# Patient Record
Sex: FEMALE | Race: WHITE | Employment: FULL TIME | URBAN - METROPOLITAN AREA
[De-identification: names, ages, dates, MRNs, and addresses within clinical notes are randomized per-mention and may not be internally consistent; named-entity substitution may affect disease eponyms.]

---

## 2017-02-23 ENCOUNTER — OFFICE VISIT (OUTPATIENT)
Dept: FAMILY MEDICINE | Facility: CLINIC | Age: 32
End: 2017-02-23
Payer: COMMERCIAL

## 2017-02-23 VITALS
OXYGEN SATURATION: 100 % | BODY MASS INDEX: 27.25 KG/M2 | SYSTOLIC BLOOD PRESSURE: 115 MMHG | DIASTOLIC BLOOD PRESSURE: 74 MMHG | TEMPERATURE: 97.7 F | HEART RATE: 75 BPM | WEIGHT: 174 LBS

## 2017-02-23 DIAGNOSIS — Z01.00 EXAMINATION OF EYES AND VISION: ICD-10-CM

## 2017-02-23 DIAGNOSIS — Z13.1 SCREENING FOR DIABETES MELLITUS: ICD-10-CM

## 2017-02-23 DIAGNOSIS — Z13.29 SCREENING FOR THYROID DISORDER: ICD-10-CM

## 2017-02-23 DIAGNOSIS — N63.21 BREAST LUMP ON LEFT SIDE AT 1 O'CLOCK POSITION: ICD-10-CM

## 2017-02-23 DIAGNOSIS — Z71.6 ENCOUNTER FOR SMOKING CESSATION COUNSELING: ICD-10-CM

## 2017-02-23 DIAGNOSIS — N94.6 DYSMENORRHEA: ICD-10-CM

## 2017-02-23 DIAGNOSIS — Z13.220 SCREENING FOR LIPOID DISORDERS: ICD-10-CM

## 2017-02-23 DIAGNOSIS — Z00.01 ENCOUNTER FOR GENERAL ADULT MEDICAL EXAMINATION WITH ABNORMAL FINDINGS: Primary | ICD-10-CM

## 2017-02-23 PROCEDURE — 99395 PREV VISIT EST AGE 18-39: CPT | Performed by: NURSE PRACTITIONER

## 2017-02-23 RX ORDER — DROSPIRENONE AND ETHINYL ESTRADIOL 0.02-3(28)
1 KIT ORAL DAILY
Qty: 84 TABLET | Refills: 1 | Status: SHIPPED | OUTPATIENT
Start: 2017-02-23 | End: 2018-03-27

## 2017-02-23 RX ORDER — BUPROPION HYDROCHLORIDE 150 MG/1
TABLET, EXTENDED RELEASE ORAL
Qty: 60 TABLET | Refills: 2 | Status: SHIPPED | OUTPATIENT
Start: 2017-02-23 | End: 2018-03-27

## 2017-02-23 ASSESSMENT — ANXIETY QUESTIONNAIRES
5. BEING SO RESTLESS THAT IT IS HARD TO SIT STILL: NOT AT ALL
6. BECOMING EASILY ANNOYED OR IRRITABLE: SEVERAL DAYS
1. FEELING NERVOUS, ANXIOUS, OR ON EDGE: SEVERAL DAYS
3. WORRYING TOO MUCH ABOUT DIFFERENT THINGS: SEVERAL DAYS
GAD7 TOTAL SCORE: 5
7. FEELING AFRAID AS IF SOMETHING AWFUL MIGHT HAPPEN: NOT AT ALL
IF YOU CHECKED OFF ANY PROBLEMS ON THIS QUESTIONNAIRE, HOW DIFFICULT HAVE THESE PROBLEMS MADE IT FOR YOU TO DO YOUR WORK, TAKE CARE OF THINGS AT HOME, OR GET ALONG WITH OTHER PEOPLE: NOT DIFFICULT AT ALL
2. NOT BEING ABLE TO STOP OR CONTROL WORRYING: SEVERAL DAYS

## 2017-02-23 ASSESSMENT — PATIENT HEALTH QUESTIONNAIRE - PHQ9: 5. POOR APPETITE OR OVEREATING: SEVERAL DAYS

## 2017-02-23 NOTE — MR AVS SNAPSHOT
After Visit Summary   2/23/2017    Shanell Roman    MRN: [id number]           Patient Information     Date Of Birth          1985        Visit Information        Provider Department      2/23/2017 11:00 AM Lisette Plata NP [facility]        Today's Diagnoses     Screening for lipoid disorders    -  1    Screening for diabetes mellitus        Screening for thyroid disorder        Dysmenorrhea        Examination of eyes and vision        Breast lump on left side at 1 o'clock position        Encounter for smoking cessation counseling          Care Instructions    Good to see you Mary!  Please schedule your fasting labs by calling [phone].  I will let you know your results.     Let me know how the birthcontrol works for you, you can skip the placebo pills as we discussed.  If you do not tlike this pill, we can switch to a different one. Please let me know if your insurance has an issue with this.     Let me know if you have any health care questions or concerns that you would like to try.  Schedule an eye exam.     Please let me know if the wellbutrin helps or not.     Preventive Health Recommendations  Female Ages 26 - 39  Yearly exam:   See your health care provider every year in order to    Review health changes.     Discuss preventive care.      Review your medicines if you your doctor has prescribed any.    Until age 30: Get a Pap test every three years (more often if you have had an abnormal result).    After age 30: Talk to your doctor about whether you should have a Pap test every 3 years or have a Pap test with HPV screening every 5 years.   You do not need a Pap test if your uterus was removed (hysterectomy) and you have not had cancer.  You should be tested each year for STDs (sexually transmitted diseases), if you're at risk.   Talk to your provider about how often to have your cholesterol checked.  If you are at risk for diabetes, you should have a diabetes test  (fasting glucose).  Shots: Get a flu shot each year. Get a tetanus shot every 10 years.   Nutrition:     Eat at least 5 servings of fruits and vegetables each day.    Eat whole-grain bread, whole-wheat pasta and brown rice instead of white grains and rice.    Talk to your provider about Calcium and Vitamin D.     Lifestyle    Exercise at least 150 minutes a week (30 minutes a day, 5 days of the week). This will help you control your weight and prevent disease.    Limit alcohol to one drink per day.    No smoking.     Wear sunscreen to prevent skin cancer.    See your dentist every six months for an exam and cleaning.        Patient Education    Bupropion Hydrobromide Oral tablet, extended-release    Bupropion Hydrochloride Oral tablet [Depression/Mood Disorders]    Bupropion Hydrochloride Oral tablet, extended release 12 hour [Depression/Mood Disorders]    Bupropion Hydrochloride Oral tablet, extended release 12 hour [Smoking Cessation]    Bupropion Hydrochloride Oral tablet, extended release 24 hour [Depression/Mood Disorders]  Bupropion Hydrochloride Oral tablet, extended release 12 hour [Smoking Cessation]  What is this medicine?  BUPROPION (byoo PROE pee on) is used to help people quit smoking.  This medicine may be used for other purposes; ask your health care provider or pharmacist if you have questions.  What should I tell my health care provider before I take this medicine?  They need to know if you have any of these conditions:    an eating disorder, such as anorexia or bulimia    bipolar disorder or psychosis    diabetes or high blood sugar, treated with medication    glaucoma    head injury or brain tumor    heart disease, previous heart attack, or irregular heart beat    high blood pressure    kidney or liver disease    seizures    suicidal thoughts or a previous suicide attempt    Tourette's syndrome    weight loss    an unusual or allergic reaction to bupropion, other medicines, foods, dyes, or  preservatives    breast-feeding    pregnant or trying to become pregnant  How should I use this medicine?  Take this medicine by mouth with a glass of water. Follow the directions on the prescription label. You can take it with or without food. If it upsets your stomach, take it with food. Do not cut, crush or chew this medicine. Take your medicine at regular intervals. If you take this medicine more than once a day, take your second dose at least 8 hours after you take your first dose. To limit difficulty in sleeping, avoid taking this medicine at bedtime. Do not take your medicine more often than directed. Do not stop taking this medicine suddenly except upon the advice of your doctor. Stopping this medicine too quickly may cause serious side effects.  A special MedGuide will be given to you by the pharmacist with each prescription and refill. Be sure to read this information carefully each time.  Talk to your pediatrician regarding the use of this medicine in children. Special care may be needed.  Overdosage: If you think you have taken too much of this medicine contact a poison control center or emergency room at once.  NOTE: This medicine is only for you. Do not share this medicine with others.  What if I miss a dose?  If you miss a dose, skip the missed dose and take your next tablet at the regular time. There should be at least 8 hours between doses. Do not take double or extra doses.  What may interact with this medicine?  Do not take this medicine with any of the following medications:    linezolid    MAOIs like Azilect, Carbex, Eldepryl, Marplan, Nardil, and Parnate    methylene blue (injected into a vein)    other medicines that contain bupropion like Wellbutrin  This medicine may also interact with the following medications:    alcohol    certain medicines for anxiety or sleep    certain medicines for blood pressure like metoprolol, propranolol    certain medicines for depression or psychotic  disturbances    certain medicines for HIV or AIDS like efavirenz, lopinavir, nelfinavir, ritonavir    certain medicines for irregular heart beat like propafenone, flecainide    certain medicines for Parkinson's disease like amantadine, levodopa    certain medicines for seizures like carbamazepine, phenytoin, phenobarbital    cimetidine    clopidogrel    cyclophosphamide    furazolidone    isoniazid    nicotine    orphenadrine    procarbazine    steroid medicines like prednisone or cortisone    stimulant medicines for attention disorders, weight loss, or to stay awake    tamoxifen    theophylline    thiotepa    ticlopidine    tramadol    warfarin  This list may not describe all possible interactions. Give your health care provider a list of all the medicines, herbs, non-prescription drugs, or dietary supplements you use. Also tell them if you smoke, drink alcohol, or use illegal drugs. Some items may interact with your medicine.  What should I watch for while using this medicine?  Visit your doctor or health care professional for regular checks on your progress. This medicine should be used together with a patient support program. It is important to participate in a behavioral program, counseling, or other support program that is recommended by your health care professional.  Patients and their families should watch out for new or worsening thoughts of suicide or depression. Also watch out for sudden changes in feelings such as feeling anxious, agitated, panicky, irritable, hostile, aggressive, impulsive, severely restless, overly excited and hyperactive, or not being able to sleep. If this happens, especially at the beginning of treatment or after a change in dose, call your health care professional.  Avoid alcoholic drinks while taking this medicine. Drinking excessive alcoholic beverages, using sleeping or anxiety medicines, or quickly stopping the use of these agents while taking this medicine may increase your  risk for a seizure.  Do not drive or use heavy machinery until you know how this medicine affects you. This medicine can impair your ability to perform these tasks.  Do not take this medicine close to bedtime. It may prevent you from sleeping.  Your mouth may get dry. Chewing sugarless gum or sucking hard candy, and drinking plenty of water may help. Contact your doctor if the problem does not go away or is severe.  Do not use nicotine patches or chewing gum without the advice of your doctor or health care professional while taking this medicine. You may need to have your blood pressure taken regularly if your doctor recommends that you use both nicotine and this medicine together.  What side effects may I notice from receiving this medicine?  Side effects that you should report to your doctor or health care professional as soon as possible:    allergic reactions like skin rash, itching or hives, swelling of the face, lips, or tongue    breathing problems    changes in vision    confusion    fast or irregular heartbeat    hallucinations    increased blood pressure    redness, blistering, peeling or loosening of the skin, including inside the mouth    seizures    suicidal thoughts or other mood changes    unusually weak or tired    vomiting  Side effects that usually do not require medical attention (report to your doctor or health care professional if they continue or are bothersome):    change in sex drive or performance    constipation    headache    loss of appetite    nausea    tremors    weight loss  This list may not describe all possible side effects. Call your doctor for medical advice about side effects. You may report side effects to FDA at 9-722-FDA-9328.  Where should I keep my medicine?  Keep out of the reach of children.  Store at room temperature between 20 and 25 degrees C (68 and 77 degrees F). Protect from light. Keep container tightly closed. Throw away any unused medicine after the expiration  date.  NOTE:This sheet is a summary. It may not cover all possible information. If you have questions about this medicine, talk to your doctor, pharmacist, or health care provider. Copyright  2016 [employer] Standard              Follow-ups after your visit        Additional Services     OPTOMETRY REFERRAL       Your provider has referred you to: FMG: [facility] [phone]   [email or web address]  FMG: [facility] [phone]   [email or web address]  FHN: [facility] [place] [phone]   [email or web address]    Please be aware that coverage of these services is subject to the terms and limitations of your health insurance plan.  Call member services at your health plan with any benefit or coverage questions.      Please bring the following with you to your appointment:    (1) Any X-Rays, CTs or MRIs which have been performed.  Contact the facility where they were done to arrange for  prior to your scheduled appointment.    (2) List of current medications  (3) This referral request   (4) Any documents/labs given to you for this referral                  Future tests that were ordered for you today     Open Future Orders        Priority Expected Expires Ordered    US Breast Left Complete 4 Quadrants Routine  5/23/2017 2/23/2017    MA Diagnostic Digital Left Routine  2/23/2018 2/23/2017    Lipid Profile (Chol, Trig, HDL, LDL calc) Routine  2/23/2018 2/23/2017    Glucose Routine  2/23/2018 2/23/2017    TSH with free T4 reflex Routine  2/23/2018 2/23/2017            Who to contact     Normal or non-critical lab and imaging results will be communicated to you by MyChart, letter or phone within 4 business days after the clinic has received the results. If you do not hear from us within 7 days, please contact the clinic through MyChart or phone. If you have a critical or abnormal lab result, we will  notify you by phone as soon as possible.  Submit refill requests through [employer] or call your pharmacy and they will forward the refill request to us. Please allow 3 business days for your refill to be completed.          If you need to speak with a  for additional information , please call: [phone]             Additional Information About Your Visit        [employer] Information     [employer] gives you secure access to your electronic health record. If you see a primary care provider, you can also send messages to your care team and make appointments. If you have questions, please call your primary care clinic.  If you do not have a primary care provider, please call [phone] and they will assist you.        Care EveryWhere ID     This is your Care EveryWhere ID. This could be used by other organizations to access your [place] medical records  [id number]        Your Vitals Were     Pulse Temperature Pulse Oximetry BMI (Body Mass Index)          75 97.7  F (36.5  C) (Oral) 100% 27.25 kg/m2         Blood Pressure from Last 3 Encounters:   02/23/17 115/74   11/11/16 123/78   11/03/16 124/75    Weight from Last 3 Encounters:   02/23/17 174 lb (78.9 kg)   11/03/16 176 lb 3.2 oz (79.9 kg)   02/26/16 160 lb (72.6 kg)              We Performed the Following     OPTOMETRY REFERRAL          Today's Medication Changes          These changes are accurate as of: 2/23/17 11:19 AM.  If you have any questions, ask your nurse or doctor.               Start taking these medicines.        Dose/Directions    buPROPion 150 MG 12 hr tablet   Commonly known as:  WELLBUTRIN SR   Used for:  Encounter for smoking cessation counseling   Started by:  Lisette Plata NP        Take 1 tablet once daily and increase to 1 tablet twice daily after 4 to 7 days   Quantity:  60 tablet   Refills:  2       drospirenone-ethinyl estradiol 3-0.02 MG per tablet   Commonly known as:  DIONY   Used for:  Dysmenorrhea   Started by:   Lisette Plata NP        Dose:  1 tablet   Take 1 tablet by mouth daily   Quantity:  84 tablet   Refills:  1            Where to get your medicines      These medications were sent to [facility]/pharmacy #[id number] - [place], [place] - [address]  [address], [place] [address]     Phone:  [phone]     buPROPion 150 MG 12 hr tablet    drospirenone-ethinyl estradiol 3-0.02 MG per tablet                Primary Care Provider Office Phone #    [facility] [phone]       No address on file        Thank you!     Thank you for choosing [facility]  for your care. Our goal is always to provide you with excellent care. Hearing back from our patients is one way we can continue to improve our services. Please take a few minutes to complete the written survey that you may receive in the mail after your visit with us. Thank you!             Your Updated Medication List - Protect others around you: Learn how to safely use, store and throw away your medicines at www.disposemymeds.org.          This list is accurate as of: 2/23/17 11:19 AM.  Always use your most recent med list.                   Brand Name Dispense Instructions for use    buPROPion 150 MG 12 hr tablet    WELLBUTRIN SR    60 tablet    Take 1 tablet once daily and increase to 1 tablet twice daily after 4 to 7 days       cephALEXin 500 MG capsule    KEFLEX    20 capsule    Take 1 capsule (500 mg) by mouth 2 times daily       drospirenone-ethinyl estradiol 3-0.02 MG per tablet    DIONY    84 tablet    Take 1 tablet by mouth daily

## 2017-02-23 NOTE — PROGRESS NOTES
SUBJECTIVE:     CC: Shanell Roman is an 31 year old woman who presents for preventive health visit.     Healthy Habits:    Do you get at least three servings of calcium containing foods daily (dairy, green leafy vegetables, etc.)? yes and no, taking calcium and/or vitamin D supplement: no    Amount of exercise or daily activities, outside of work: 2-3 day(s) per week    Problems taking medications regularly No    Medication side effects: No    Have you had an eye exam in the past two years? no    Do you see a dentist twice per year? no    Do you have sleep apnea, excessive snoring or daytime drowsiness? no        PROBLEMS TO ADD ON... Smoking cessation    Today's PHQ-2 Score:   PHQ-2 ( 1999 Pfizer) 2/23/2017 2/18/2017   Q1: Little interest or pleasure in doing things 0 -   Q2: Feeling down, depressed or hopeless 0 -   PHQ-2 Score 0 -   Little interest or pleasure in doing things - Not at all   Feeling down, depressed or hopeless - Not at all   PHQ-2 Score - 0       Abuse: Current or Past(Physical, Sexual or Emotional)- No  Do you feel safe in your environment - Yes    Social History   Substance Use Topics     Smoking status: Current Every Day Smoker     Packs/day: 0.50     Types: Cigarettes     Smokeless tobacco: Never Used     Alcohol use 1.0 oz/week     2 Standard drinks or equivalent per week      Comment: rare     The patient does not drink >3 drinks per day nor >7 drinks per week.    Recent Labs   Lab Test  06/09/15   1030   CHOL  143   HDL  52   LDL  77   TRIG  72   CHOLHDLRATIO  2.8       Reviewed orders with patient.  Reviewed health maintenance and updated orders accordingly - Yes    Mammo Decision Support:  Mammo discussed, not appropriate for or declined by this patient.  Alternate mammogram schedule due to breast cancer history in grandmother    Pertinent mammograms are reviewed under the imaging tab.  History of abnormal Pap smear: NO - age 30- 65 PAP every 3 years recommended  All Histories  reviewed and updated in Epic.  Past Medical History   Diagnosis Date     NO ACTIVE PROBLEMS       Past Surgical History   Procedure Laterality Date     Nose surgery  2005     deviated septum     Wrist surgery  2007     torn ligament     Obstetric History     No data available          ROS:  C: NEGATIVE for fever, chills, change in weight  I: NEGATIVE for worrisome rashes, moles or lesions  E: NEGATIVE for vision changes or irritation  ENT: NEGATIVE for ear, mouth and throat problems  R: NEGATIVE for significant cough or SOB  B: NEGATIVE for masses, tenderness or discharge  CV: NEGATIVE for chest pain, palpitations or peripheral edema  GI: NEGATIVE for nausea, abdominal pain, heartburn, or change in bowel habits  : NEGATIVE for unusual urinary or vaginal symptoms. Periods are regular POSITIVE for heavy periods with cramping affecting her ability to go to work. Would like to try restarting OCPs  M: NEGATIVE for significant arthralgias or myalgia  N: NEGATIVE for weakness, dizziness or paresthesias  E: NEGATIVE for temperature intolerance, skin/hair changes  H: NEGATIVE for bleeding problems  P: NEGATIVE for changes in mood or affect    Problem list, Medication list, Allergies, and Medical/Social/Surgical histories reviewed in [facility] and updated as appropriate.  Labs reviewed in EPIC  BP Readings from Last 3 Encounters:   02/23/17 115/74   11/11/16 123/78   11/03/16 124/75    Wt Readings from Last 3 Encounters:   02/23/17 174 lb (78.9 kg)   11/03/16 176 lb 3.2 oz (79.9 kg)   02/26/16 160 lb (72.6 kg)                  Patient Active Problem List   Diagnosis     Major depressive disorder, recurrent episode, mild (H)     Anxiety disorder     CARDIOVASCULAR SCREENING; LDL GOAL LESS THAN 160     Past Surgical History   Procedure Laterality Date     Nose surgery  2005     deviated septum     Wrist surgery  2007     torn ligament       Social History   Substance Use Topics     Smoking status: Current Every Day Smoker      Packs/day: 0.50     Types: Cigarettes     Smokeless tobacco: Never Used     Alcohol use 1.0 oz/week     2 Standard drinks or equivalent per week      Comment: rare     Family History   Problem Relation Age of Onset     Depression Mother      Anxiety Disorder Mother      Heart Failure Father      mi     DIABETES Father      Hypertension Father      Hyperlipidemia Father      Breast Cancer Maternal Grandmother 70     Heart Failure Maternal Grandfather      Other Cancer Paternal Grandmother      lung     Heart Failure Paternal Grandfather      Depression Sister      Anxiety Disorder Sister          Current Outpatient Prescriptions   Medication Sig Dispense Refill     drospirenone-ethinyl estradiol (DIONY) 3-0.02 MG per tablet Take 1 tablet by mouth daily 84 tablet 1     buPROPion (WELLBUTRIN SR) 150 MG 12 hr tablet Take 1 tablet once daily and increase to 1 tablet twice daily after 4 to 7 days 60 tablet 2     cephALEXin (KEFLEX) 500 MG capsule Take 1 capsule (500 mg) by mouth 2 times daily (Patient not taking: Reported on 2/23/2017) 20 capsule 0     Allergies   Allergen Reactions     Erythromycin Nausea and Vomiting     OBJECTIVE:     /74  Pulse 75  Temp 97.7  F (36.5  C) (Oral)  Wt 174 lb (78.9 kg)  SpO2 100%  BMI 27.25 kg/m2  EXAM:  GENERAL: healthy, alert and no distress  EYES: Eyes grossly normal to inspection, PERRL and conjunctivae and sclerae normal  HENT: ear canals and TM's normal, nose and mouth without ulcers or lesions  NECK: no adenopathy, no asymmetry, masses, or scars and thyroid normal to palpation  RESP: lungs clear to auscultation - no rales, rhonchi or wheezes  BREAST: normal without tenderness or nipple discharge and no palpable axillary masses or adenopathy POSITIVE for abnormal lump on L breast at 1 o'clock position  CV: regular rate and rhythm, normal S1 S2, no S3 or S4, no murmur, click or rub, no peripheral edema and peripheral pulses strong  ABDOMEN: soft, nontender, no hepatosplenomegaly,  "no masses and bowel sounds normal  MS: no gross musculoskeletal defects noted, no edema  SKIN: no suspicious lesions or rashes  NEURO: Normal strength and tone, mentation intact and speech normal  PSYCH: mentation appears normal, affect normal/bright  LYMPH: no cervical, supraclavicular, axillary adenopathy    ASSESSMENT/PLAN:         ICD-10-CM    1. Encounter for general adult medical examination with abnormal findings Z00.01    2. Screening for lipoid disorders Z13.220 Lipid Profile (Chol, Trig, HDL, LDL calc)   3. Screening for diabetes mellitus Z13.1 Glucose   4. Screening for thyroid disorder Z13.29 TSH with free T4 reflex   5. Dysmenorrhea N94.6 drospirenone-ethinyl estradiol (DIONY) 3-0.02 MG per tablet   6. Examination of eyes and vision Z01.00 OPTOMETRY REFERRAL   7. Breast lump on left side at 1 o'clock position N63 US Breast Left Complete 4 Quadrants     MA Diagnostic Digital Left   8. Encounter for smoking cessation counseling Z71.6 buPROPion (WELLBUTRIN SR) 150 MG 12 hr tablet    Z72.0        COUNSELING:   Reviewed preventive health counseling, as reflected in patient instructions         reports that she has been smoking Cigarettes.  She has been smoking about 0.50 packs per day. She has never used smokeless tobacco.  Tobacco Cessation Action Plan: Pharmacotherapies : Zyban/Wellbutrin  Estimated body mass index is 27.25 kg/(m^2) as calculated from the following:    Height as of 2/26/16: 5' 7\" (1.702 m).    Weight as of this encounter: 174 lb (78.9 kg).   Weight management plan: Discussed healthy diet and exercise guidelines and patient will follow up in 12 months in clinic to re-evaluate.     Discussed risks and benefits of the pill. Let me know how this works for you. You can skip the placebo pills as we discussed. Follow up if you have any health care questions or concerns.     Counseling Resources:  ATP IV Guidelines  Pooled Cohorts Equation Calculator  Breast Cancer Risk Calculator  FRAX Risk " Assessment  ICSI Preventive Guidelines  Dietary Guidelines for Americans, 2010  USDA's MyPlate  ASA Prophylaxis  Lung CA Screening    SE Zabala  [facility] TIMOTHY

## 2017-02-23 NOTE — NURSING NOTE
"Chief Complaint   Patient presents with     Physical       Initial /74  Pulse 75  Temp 97.7  F (36.5  C) (Oral)  Wt 174 lb (78.9 kg)  SpO2 100%  BMI 27.25 kg/m2 Estimated body mass index is 27.25 kg/(m^2) as calculated from the following:    Height as of 2/26/16: 5' 7\" (1.702 m).    Weight as of this encounter: 174 lb (78.9 kg).  Medication Reconciliation: complete     Cornelio Montoya CMA    "

## 2017-02-23 NOTE — PATIENT INSTRUCTIONS
Good to see you Mary!  Please schedule your fasting labs by calling [phone].  I will let you know your results.     Let me know how the birthcontrol works for you, you can skip the placebo pills as we discussed.  If you do not tlike this pill, we can switch to a different one. Please let me know if your insurance has an issue with this.     Let me know if you have any health care questions or concerns that you would like to try.  Schedule an eye exam.     Please let me know if the wellbutrin helps or not.     Preventive Health Recommendations  Female Ages 26 - 39  Yearly exam:   See your health care provider every year in order to    Review health changes.     Discuss preventive care.      Review your medicines if you your doctor has prescribed any.    Until age 30: Get a Pap test every three years (more often if you have had an abnormal result).    After age 30: Talk to your doctor about whether you should have a Pap test every 3 years or have a Pap test with HPV screening every 5 years.   You do not need a Pap test if your uterus was removed (hysterectomy) and you have not had cancer.  You should be tested each year for STDs (sexually transmitted diseases), if you're at risk.   Talk to your provider about how often to have your cholesterol checked.  If you are at risk for diabetes, you should have a diabetes test (fasting glucose).  Shots: Get a flu shot each year. Get a tetanus shot every 10 years.   Nutrition:     Eat at least 5 servings of fruits and vegetables each day.    Eat whole-grain bread, whole-wheat pasta and brown rice instead of white grains and rice.    Talk to your provider about Calcium and Vitamin D.     Lifestyle    Exercise at least 150 minutes a week (30 minutes a day, 5 days of the week). This will help you control your weight and prevent disease.    Limit alcohol to one drink per day.    No smoking.     Wear sunscreen to prevent skin cancer.    See your dentist every six months for an  exam and cleaning.        Patient Education    Bupropion Hydrobromide Oral tablet, extended-release    Bupropion Hydrochloride Oral tablet [Depression/Mood Disorders]    Bupropion Hydrochloride Oral tablet, extended release 12 hour [Depression/Mood Disorders]    Bupropion Hydrochloride Oral tablet, extended release 12 hour [Smoking Cessation]    Bupropion Hydrochloride Oral tablet, extended release 24 hour [Depression/Mood Disorders]  Bupropion Hydrochloride Oral tablet, extended release 12 hour [Smoking Cessation]  What is this medicine?  BUPROPION (byoo PROE pee on) is used to help people quit smoking.  This medicine may be used for other purposes; ask your health care provider or pharmacist if you have questions.  What should I tell my health care provider before I take this medicine?  They need to know if you have any of these conditions:    an eating disorder, such as anorexia or bulimia    bipolar disorder or psychosis    diabetes or high blood sugar, treated with medication    glaucoma    head injury or brain tumor    heart disease, previous heart attack, or irregular heart beat    high blood pressure    kidney or liver disease    seizures    suicidal thoughts or a previous suicide attempt    Tourette's syndrome    weight loss    an unusual or allergic reaction to bupropion, other medicines, foods, dyes, or preservatives    breast-feeding    pregnant or trying to become pregnant  How should I use this medicine?  Take this medicine by mouth with a glass of water. Follow the directions on the prescription label. You can take it with or without food. If it upsets your stomach, take it with food. Do not cut, crush or chew this medicine. Take your medicine at regular intervals. If you take this medicine more than once a day, take your second dose at least 8 hours after you take your first dose. To limit difficulty in sleeping, avoid taking this medicine at bedtime. Do not take your medicine more often than  directed. Do not stop taking this medicine suddenly except upon the advice of your doctor. Stopping this medicine too quickly may cause serious side effects.  A special MedGuide will be given to you by the pharmacist with each prescription and refill. Be sure to read this information carefully each time.  Talk to your pediatrician regarding the use of this medicine in children. Special care may be needed.  Overdosage: If you think you have taken too much of this medicine contact a poison control center or emergency room at once.  NOTE: This medicine is only for you. Do not share this medicine with others.  What if I miss a dose?  If you miss a dose, skip the missed dose and take your next tablet at the regular time. There should be at least 8 hours between doses. Do not take double or extra doses.  What may interact with this medicine?  Do not take this medicine with any of the following medications:    linezolid    MAOIs like Azilect, Carbex, Eldepryl, Marplan, Nardil, and Parnate    methylene blue (injected into a vein)    other medicines that contain bupropion like Wellbutrin  This medicine may also interact with the following medications:    alcohol    certain medicines for anxiety or sleep    certain medicines for blood pressure like metoprolol, propranolol    certain medicines for depression or psychotic disturbances    certain medicines for HIV or AIDS like efavirenz, lopinavir, nelfinavir, ritonavir    certain medicines for irregular heart beat like propafenone, flecainide    certain medicines for Parkinson's disease like amantadine, levodopa    certain medicines for seizures like carbamazepine, phenytoin, phenobarbital    cimetidine    clopidogrel    cyclophosphamide    furazolidone    isoniazid    nicotine    orphenadrine    procarbazine    steroid medicines like prednisone or cortisone    stimulant medicines for attention disorders, weight loss, or to stay  awake    tamoxifen    theophylline    thiotepa    ticlopidine    tramadol    warfarin  This list may not describe all possible interactions. Give your health care provider a list of all the medicines, herbs, non-prescription drugs, or dietary supplements you use. Also tell them if you smoke, drink alcohol, or use illegal drugs. Some items may interact with your medicine.  What should I watch for while using this medicine?  Visit your doctor or health care professional for regular checks on your progress. This medicine should be used together with a patient support program. It is important to participate in a behavioral program, counseling, or other support program that is recommended by your health care professional.  Patients and their families should watch out for new or worsening thoughts of suicide or depression. Also watch out for sudden changes in feelings such as feeling anxious, agitated, panicky, irritable, hostile, aggressive, impulsive, severely restless, overly excited and hyperactive, or not being able to sleep. If this happens, especially at the beginning of treatment or after a change in dose, call your health care professional.  Avoid alcoholic drinks while taking this medicine. Drinking excessive alcoholic beverages, using sleeping or anxiety medicines, or quickly stopping the use of these agents while taking this medicine may increase your risk for a seizure.  Do not drive or use heavy machinery until you know how this medicine affects you. This medicine can impair your ability to perform these tasks.  Do not take this medicine close to bedtime. It may prevent you from sleeping.  Your mouth may get dry. Chewing sugarless gum or sucking hard candy, and drinking plenty of water may help. Contact your doctor if the problem does not go away or is severe.  Do not use nicotine patches or chewing gum without the advice of your doctor or health care professional while taking this medicine. You may need to  have your blood pressure taken regularly if your doctor recommends that you use both nicotine and this medicine together.  What side effects may I notice from receiving this medicine?  Side effects that you should report to your doctor or health care professional as soon as possible:    allergic reactions like skin rash, itching or hives, swelling of the face, lips, or tongue    breathing problems    changes in vision    confusion    fast or irregular heartbeat    hallucinations    increased blood pressure    redness, blistering, peeling or loosening of the skin, including inside the mouth    seizures    suicidal thoughts or other mood changes    unusually weak or tired    vomiting  Side effects that usually do not require medical attention (report to your doctor or health care professional if they continue or are bothersome):    change in sex drive or performance    constipation    headache    loss of appetite    nausea    tremors    weight loss  This list may not describe all possible side effects. Call your doctor for medical advice about side effects. You may report side effects to FDA at 8-595-FDA-5841.  Where should I keep my medicine?  Keep out of the reach of children.  Store at room temperature between 20 and 25 degrees C (68 and 77 degrees F). Protect from light. Keep container tightly closed. Throw away any unused medicine after the expiration date.  NOTE:This sheet is a summary. It may not cover all possible information. If you have questions about this medicine, talk to your doctor, pharmacist, or health care provider. Copyright  2016 [employer]

## 2017-02-24 ASSESSMENT — PATIENT HEALTH QUESTIONNAIRE - PHQ9: SUM OF ALL RESPONSES TO PHQ QUESTIONS 1-9: 5

## 2017-02-24 ASSESSMENT — ANXIETY QUESTIONNAIRES: GAD7 TOTAL SCORE: 5

## 2017-02-27 ENCOUNTER — MYC MEDICAL ADVICE (OUTPATIENT)
Dept: FAMILY MEDICINE | Facility: OTHER | Age: 32
End: 2017-02-27

## 2017-03-06 ENCOUNTER — RADIANT APPOINTMENT (OUTPATIENT)
Dept: ULTRASOUND IMAGING | Facility: CLINIC | Age: 32
End: 2017-03-06
Attending: NURSE PRACTITIONER
Payer: COMMERCIAL

## 2017-03-06 ENCOUNTER — RADIANT APPOINTMENT (OUTPATIENT)
Dept: MAMMOGRAPHY | Facility: CLINIC | Age: 32
End: 2017-03-06
Attending: NURSE PRACTITIONER
Payer: COMMERCIAL

## 2017-03-06 DIAGNOSIS — N63.21 BREAST LUMP ON LEFT SIDE AT 1 O'CLOCK POSITION: ICD-10-CM

## 2017-03-06 PROCEDURE — 76642 ULTRASOUND BREAST LIMITED: CPT | Mod: 50 | Performed by: RADIOLOGY

## 2017-03-06 PROCEDURE — 77062 BREAST TOMOSYNTHESIS BI: CPT | Performed by: RADIOLOGY

## 2017-03-06 PROCEDURE — G0204 DX MAMMO INCL CAD BI: HCPCS | Performed by: RADIOLOGY

## 2017-03-07 NOTE — PROGRESS NOTES
Justino Reece,    I just received the results of your breast ultrasound, and I wanted to see if you were planning to do the breast biopsy.  Please let me know if you have any questions that I might be able to answer, or if you need anything.     Feel free to contact me via [employer] or call the clinic at [phone].    Sincerely,    LESLY Mendes, FNP-BC

## 2017-03-09 ENCOUNTER — RADIANT APPOINTMENT (OUTPATIENT)
Dept: ULTRASOUND IMAGING | Facility: CLINIC | Age: 32
End: 2017-03-09
Attending: NURSE PRACTITIONER
Payer: COMMERCIAL

## 2017-03-09 DIAGNOSIS — N63.21 BREAST LUMP ON LEFT SIDE AT 1 O'CLOCK POSITION: ICD-10-CM

## 2017-03-09 PROCEDURE — 88305 TISSUE EXAM BY PATHOLOGIST: CPT | Performed by: FAMILY MEDICINE

## 2017-03-09 PROCEDURE — 19083 BX BREAST 1ST LESION US IMAG: CPT | Mod: LT | Performed by: STUDENT IN AN ORGANIZED HEALTH CARE EDUCATION/TRAINING PROGRAM

## 2017-03-09 NOTE — PROGRESS NOTES
Shanell was seen in the [facility] today for a left core needle breast biopsy. Procedure was explained and consent was obtained. The entire breast was prepped and sterile drape was applied. 10 ml Lidocaine (NDC 2359-2769-24) with 1 ml 8.4% Na Bicarbonate (NDC 9959-6747-57) was used to anesthetize the skin and subcutaneous tissue.   Specimen placed in formalin  Dressing applied per protocol.  Procedure was preformed with no complications.  Pain at start of procedure 0/10. Pain at end of procedure 0/10.  Patient d/c with home care and follow-up instructions.  Procedure performed by: Dr Triplett  Other staff present: Judith

## 2017-03-11 LAB — COPATH REPORT: NORMAL

## 2017-03-13 ENCOUNTER — TELEPHONE (OUTPATIENT)
Dept: GENERAL RADIOLOGY | Facility: CLINIC | Age: 32
End: 2017-03-13

## 2017-04-27 ENCOUNTER — PRENATAL OFFICE VISIT (OUTPATIENT)
Dept: OBGYN | Facility: CLINIC | Age: 32
End: 2017-04-27
Payer: COMMERCIAL

## 2017-04-27 VITALS
HEIGHT: 67 IN | SYSTOLIC BLOOD PRESSURE: 123 MMHG | DIASTOLIC BLOOD PRESSURE: 78 MMHG | BODY MASS INDEX: 26.84 KG/M2 | TEMPERATURE: 96.5 F | HEART RATE: 73 BPM | WEIGHT: 171 LBS | OXYGEN SATURATION: 100 %

## 2017-04-27 DIAGNOSIS — N92.0 MENORRHAGIA WITH REGULAR CYCLE: ICD-10-CM

## 2017-04-27 DIAGNOSIS — Z31.69 ENCOUNTER FOR PRECONCEPTION CONSULTATION: ICD-10-CM

## 2017-04-27 DIAGNOSIS — N94.6 DYSMENORRHEA: Primary | ICD-10-CM

## 2017-04-27 PROCEDURE — 99214 OFFICE O/P EST MOD 30 MIN: CPT | Performed by: OBSTETRICS & GYNECOLOGY

## 2017-04-27 RX ORDER — FOLIC ACID 1 MG/1
1 TABLET ORAL DAILY
Qty: 100 TABLET | Refills: 3 | Status: SHIPPED | OUTPATIENT
Start: 2017-04-27 | End: 2018-03-27

## 2017-04-27 NOTE — PATIENT INSTRUCTIONS
If you have any questions regarding your visit, Please contact your care team.    [facility] CLINIC HOURS TELEPHONE NUMBER   Clintons Agbeh, M.D.    Romelia Wang- ALEKS Shaw - ALEKS Pinto -         Monday-[facility]  8:00 am - 5 pm  Tuesday- [facility]  8:00am- 5 pm  Wednesday- Off  Thursday- [facility]  8:00 am- 5 pm  Friday-[place]  8:00 am 5 pm [facility]  [address] [place] [address]  [phone] ask for [facility]    Imaging [phone]    [facility]  [address] [address]  [place] [id number] [phone]  Imaging [phone]     Urgent Care locations:    [facility] Saturday and Sunday   9 am - 5 pm    Monday-Friday   12 pm - 8 pm  Saturday and Sunday   9 am - 5 pm   [phone]       If you need a medication refill, please contact your pharmacy. Please allow 3 business days for your refill to be completed.  As always, Thank you for trusting us with your healthcare needs!

## 2017-04-27 NOTE — NURSING NOTE
"Chief Complaint   Patient presents with     Consult     preconception       Initial /78 (BP Location: Left arm, Patient Position: Chair, Cuff Size: Adult Regular)  Pulse 73  Temp 96.5  F (35.8  C) (Tympanic)  Ht 5' 6.5\" (1.689 m)  Wt 171 lb (77.6 kg)  LMP 04/16/2017  SpO2 100%  BMI 27.19 kg/m2 Estimated body mass index is 27.19 kg/(m^2) as calculated from the following:    Height as of this encounter: 5' 6.5\" (1.689 m).    Weight as of this encounter: 171 lb (77.6 kg).  Medication Reconciliation: complete     Romelia Staples LPN    "

## 2017-04-27 NOTE — PROGRESS NOTES
"Shanell is a 31 year old  referred here by self for consultation regarding preconception counseling.  She is in a same sex relationship and here with her partner, Alma Delia. They intend to use sperm bank..  They have friends who have used this service.  She is concerned about infertility due to painful regular menses that are heavy since menarche at age 11.   Was placed on OCP, bur quit.  Positive H/O vaginal intercourse. Normal paps. No STD histor  ROS: Ten point review of systems was reviewed and negative except the above.    Gyne: - abn pap (last pap ), - STD's    Past Medical History:   Diagnosis Date     NO ACTIVE PROBLEMS      Past Surgical History:   Procedure Laterality Date     NOSE SURGERY      deviated septum     WRIST SURGERY      torn ligament     Patient Active Problem List   Diagnosis     Major depressive disorder, recurrent episode, mild (H)     Anxiety disorder     CARDIOVASCULAR SCREENING; LDL GOAL LESS THAN 160       ALL/Meds: Her medication and allergy histories were reviewed and are documented in their appropriate chart areas.    SH: - tob, - EtOH,     FH: Her family history was reviewed and documented in its appropriate chart area.    PE: /78 (BP Location: Left arm, Patient Position: Chair, Cuff Size: Adult Regular)  Pulse 73  Temp 96.5  F (35.8  C) (Tympanic)  Ht 5' 6.5\" (1.689 m)  Wt 171 lb (77.6 kg)  LMP 2017  SpO2 100%  BMI 27.19 kg/m2  Body mass index is 27.19 kg/(m^2).    General:  WNWD female, NAD  Alert  Oriented x 3  Gait:  Normal  Skin:  Normal skin turgor  HEENT:  NC/AT, EOMI  Abdomen:  Non-tender, non-distended.  Pelvic exam:  Not performed  Extremities:  No clubbing, no cyanosis and no edema.      A/P    ICD-10-CM    1. Dysmenorrhea N94.6 US Pelvic Complete w Transvaginal     Lutropin     Follicle stimulating hormone     Progesterone     Estradiol     Testosterone Free and Total     DHEA sulfate     Prolactin     Comprehensive metabolic panel "     folic acid (FOLVITE) 1 MG tablet   2. Menorrhagia with regular cycle N92.0 US Pelvic Complete w Transvaginal     Lutropin     Follicle stimulating hormone     Progesterone     Estradiol     Testosterone Free and Total     DHEA sulfate     Prolactin     Comprehensive metabolic panel     folic acid (FOLVITE) 1 MG tablet   3. Encounter for preconception consultation Z31.69 US Pelvic Complete w Transvaginal     Lutropin     Follicle stimulating hormone     Progesterone     Estradiol     Testosterone Free and Total     DHEA sulfate     Prolactin     Comprehensive metabolic panel     folic acid (FOLVITE) 1 MG tablet       We discussed ability to document ovulation with BBT, Ovulation kits and labs.  Ultrasound for uterine anatomy. Possible HSG as needed.   Education of women intending to be pregnant is recommended to include:[4]     labor education and prevention  Substance use  Weight and nutrition and pregnancy  Domestic violence and pregnancy  List of medications, dietary supplements, herbal supplements  Accurate recording of menstrual dates  Counseling in case of potential vaginal birth after Caesarean   Vaccination and prophylaxisVaccination and other prophylaxis of women intending to become pregnant is recommended to include:  Tdap[6] or tetanus booster[4] if needed  rubella and/or varicella vaccination if needed  Hepatitis B vaccine  Folic acid supplementation    [employer] pamphlets were provided on the above topics.  30 minutes was spent face to face with the patient today discussing her history, diagnosis, and follow-up plan as noted above.  Over 50% of the visit was spent in counseling and coordination of care.    return to clinic in after tests are completed.    Total Visit Time: 40 minutes.    CEPHAS AGBEH, MD.

## 2018-03-21 ENCOUNTER — MYC MEDICAL ADVICE (OUTPATIENT)
Dept: FAMILY MEDICINE | Facility: CLINIC | Age: 33
End: 2018-03-21

## 2018-03-26 NOTE — PATIENT INSTRUCTIONS
Follow up as needed for any health care questions/ concerns.     Preventive Health Recommendations  Female Ages 26 - 39  Yearly exam:   See your health care provider every year in order to    Review health changes.     Discuss preventive care.      Review your medicines if you your doctor has prescribed any.    Until age 30: Get a Pap test every three years (more often if you have had an abnormal result).    After age 30: Talk to your doctor about whether you should have a Pap test every 3 years or have a Pap test with HPV screening every 5 years.   You do not need a Pap test if your uterus was removed (hysterectomy) and you have not had cancer.  You should be tested each year for STDs (sexually transmitted diseases), if you're at risk.   Talk to your provider about how often to have your cholesterol checked.  If you are at risk for diabetes, you should have a diabetes test (fasting glucose).  Shots: Get a flu shot each year. Get a tetanus shot every 10 years.   Nutrition:     Eat at least 5 servings of fruits and vegetables each day.    Eat whole-grain bread, whole-wheat pasta and brown rice instead of white grains and rice.    Talk to your provider about Calcium and Vitamin D.     Lifestyle    Exercise at least 150 minutes a week (30 minutes a day, 5 days of the week). This will help you control your weight and prevent disease.    Limit alcohol to one drink per day.    No smoking.     Wear sunscreen to prevent skin cancer.    See your dentist every six months for an exam and cleaning.

## 2018-03-26 NOTE — PROGRESS NOTES
SUBJECTIVE:   CC: Shanell Roman is an 32 year old woman who presents for preventive health visit.     Physical   Annual:     Getting at least 3 servings of Calcium per day::  Yes    Bi-annual eye exam::  NO    Dental care twice a year::  NO    Sleep apnea or symptoms of sleep apnea::  Daytime drowsiness    Diet::  Regular (no restrictions)    Frequency of exercise::  2-3 days/week    Duration of exercise::  15-30 minutes    Taking medications regularly::  Yes    Medication side effects::  Not applicable    Additional concerns today::  YES            Patient/Parent of patient informed that anything we discuss that is not related to preventative medicine, may be billed for; patient verbalizes understanding.    Concern(s):  1. Period problems-faint, sweaty, hot, pain- worsening each month, not bleeding heavier.  No pain currently.        Today's PHQ-2 Score:   PHQ-2 ( 1999 [employer]) 3/27/2018   Q1: Little interest or pleasure in doing things 2   Q2: Feeling down, depressed or hopeless 1   PHQ-2 Score 3   Q1: Little interest or pleasure in doing things -   Q2: Feeling down, depressed or hopeless -   PHQ-2 Score -       Abuse: Current or Past(Physical, Sexual or Emotional)- No  Do you feel safe in your environment - Yes    Social History   Substance Use Topics     Smoking status: Current Every Day Smoker     Packs/day: 0.50     Types: Cigarettes     Smokeless tobacco: Never Used     Alcohol use 1.0 oz/week     2 Standard drinks or equivalent per week      Comment: rare     Alcohol Use 3/21/2018   If you drink alcohol do you typically have greater than 3 drinks per day OR greater than 7 drinks per week? No   No flowsheet data found.    Reviewed orders with patient.  Reviewed health maintenance and updated orders accordingly - Yes  Labs reviewed in EPIC  BP Readings from Last 3 Encounters:   03/27/18 126/78   04/27/17 123/78   02/23/17 115/74    Wt Readings from Last 3 Encounters:   03/27/18 167 lb 12.8 oz (76.1 kg)    04/27/17 171 lb (77.6 kg)   02/23/17 174 lb (78.9 kg)                  Patient Active Problem List   Diagnosis     Major depressive disorder, recurrent episode, mild (H)     Anxiety disorder     CARDIOVASCULAR SCREENING; LDL GOAL LESS THAN 160     Past Surgical History:   Procedure Laterality Date     NOSE SURGERY  2005    deviated septum     WRIST SURGERY  2007    torn ligament       Social History   Substance Use Topics     Smoking status: Current Every Day Smoker     Packs/day: 0.50     Types: Cigarettes     Smokeless tobacco: Never Used     Alcohol use 1.0 oz/week     2 Standard drinks or equivalent per week      Comment: rare     Family History   Problem Relation Age of Onset     Depression Mother      Anxiety Disorder Mother      Heart Failure Father      mi     DIABETES Father      Hypertension Father      Hyperlipidemia Father      Breast Cancer Maternal Grandmother 70     Heart Failure Maternal Grandfather      Other Cancer Paternal Grandmother      lung     Heart Failure Paternal Grandfather      Depression Sister      Anxiety Disorder Sister          Current Outpatient Prescriptions   Medication Sig Dispense Refill     diclofenac (VOLTAREN) 50 MG EC tablet Take 1 tablet (50 mg) by mouth 3 times daily as needed for moderate pain 60 tablet 1     Allergies   Allergen Reactions     Erythromycin Nausea and Vomiting       Mammogram not appropriate for this patient based on age.    Pertinent mammograms are reviewed under the imaging tab.  History of abnormal Pap smear: NO - age 30- 65 PAP every 3 years recommended    Reviewed and updated as needed this visit by clinical staff  Tobacco  Allergies  Meds  Med Hx  Surg Hx  Fam Hx  Soc Hx        Reviewed and updated as needed this visit by Provider        Past Medical History:   Diagnosis Date     NO ACTIVE PROBLEMS       Past Surgical History:   Procedure Laterality Date     NOSE SURGERY  2005    deviated septum     WRIST SURGERY  2007    torn ligament  "    Obstetric History       T0      L0     SAB0   TAB0   Ectopic0   Multiple0   Live Births0           Review of Systems  C: NEGATIVE for fever, chills, change in weight  I: NEGATIVE for worrisome rashes, moles or lesions  E: NEGATIVE for vision changes or irritation  ENT: NEGATIVE for ear, mouth and throat problems  R: NEGATIVE for significant cough or SOB  B: NEGATIVE for masses, tenderness or discharge  CV: NEGATIVE for chest pain, palpitations or peripheral edema  GI: NEGATIVE for nausea, abdominal pain, heartburn, or change in bowel habits  : NEGATIVE for unusual urinary or vaginal symptoms. Periods are regular, POSITIVE for increased cramping with menses, last month felt faint/ sweaty/ hot x 1  M: NEGATIVE for significant arthralgias or myalgia  N: NEGATIVE for weakness, dizziness or paresthesias  E: NEGATIVE for temperature intolerance, skin/hair changes  H: NEGATIVE for bleeding problems  P: NEGATIVE for changes in mood or affect     OBJECTIVE:   /78  Pulse 75  Temp 98.4  F (36.9  C) (Oral)  Resp 20  Ht 5' 6.53\" (1.69 m)  Wt 167 lb 12.8 oz (76.1 kg)  LMP 2018 (Approximate)  SpO2 99%  Breastfeeding? No  BMI 26.65 kg/m2  Physical Exam  GENERAL: healthy, alert and no distress  EYES: Eyes grossly normal to inspection, PERRL and conjunctivae and sclerae normal  HENT: ear canals and TM's normal, nose and mouth without ulcers or lesions  NECK: no adenopathy, no asymmetry, masses, or scars and thyroid normal to palpation  RESP: lungs clear to auscultation - no rales, rhonchi or wheezes  BREAST: deferred per pt.  Had biopsy of lump (with US and mammogram) results were negative.   CV: regular rate and rhythm, normal S1 S2, no S3 or S4, no murmur, click or rub, no peripheral edema and peripheral pulses strong  ABDOMEN: soft, nontender, no hepatosplenomegaly, no masses and bowel sounds normal   (female): normal female external genitalia, normal urethral meatus, vaginal mucosa pink, " "moist, well rugated, and normal cervix/adnexa/uterus without masses or discharge  RECTAL: normal sphincter tone  MS: no gross musculoskeletal defects noted, no edema  SKIN: no suspicious lesions or rashes  NEURO: Normal strength and tone, mentation intact and speech normal  PSYCH: mentation appears normal, affect normal/bright  LYMPH: no cervical, supraclavicular, axillary, or inguinal adenopathy    ASSESSMENT/PLAN:       ICD-10-CM    1. Routine general medical examination at a health care facility Z00.00    2. Encounter for tobacco use cessation counseling Z71.6 TOBACCO CESSATION - FOR HEALTH MAINTENANCE   3. Screening for cervical cancer Z12.4 Pap imaged thin layer screen with HPV - recommended age 30 - 65 years (select HPV order below)   4. Screening for human papillomavirus Z11.51 HPV High Risk Types DNA Cervical   5. Dysmenorrhea N94.6 US Pelvic Complete w Transvaginal     diclofenac (VOLTAREN) 50 MG EC tablet    worsening each month   6. Screening for lipoid disorders Z13.220 Lipid panel reflex to direct LDL Non-fasting   7. Screening for diabetes mellitus Z13.1 Hemoglobin A1c   8. Screening for thyroid disorder Z13.29 TSH with free T4 reflex       COUNSELING:  Reviewed preventive health counseling, as reflected in patient instructions Discussed options for worsening menstrual symptoms. Discussed US when symptomatic, NSAIDS prior to menses, can switch to progesterone only pill or depo shot.  Pt going to think about options and let me know. Follow up as needed for any health care questions/ concerns.          reports that she has been smoking Cigarettes.  She has been smoking about 0.50 packs per day. She has never used smokeless tobacco.  Tobacco Cessation Action Plan: Information offered: Patient not interested at this time  Estimated body mass index is 26.65 kg/(m^2) as calculated from the following:    Height as of this encounter: 5' 6.53\" (1.69 m).    Weight as of this encounter: 167 lb 12.8 oz (76.1 kg). "   Weight management plan: Discussed healthy diet and exercise guidelines and patient will follow up in 12 months in clinic to re-evaluate.    Counseling Resources:  ATP IV Guidelines  Pooled Cohorts Equation Calculator  Breast Cancer Risk Calculator  FRAX Risk Assessment  ICSI Preventive Guidelines  Dietary Guidelines for Americans, 2010  USDA's MyPlate  ASA Prophylaxis  Lung CA Screening    SE Zabala  [facility] TIMOTHY

## 2018-03-27 ENCOUNTER — OFFICE VISIT (OUTPATIENT)
Dept: FAMILY MEDICINE | Facility: CLINIC | Age: 33
End: 2018-03-27
Payer: COMMERCIAL

## 2018-03-27 VITALS
SYSTOLIC BLOOD PRESSURE: 126 MMHG | TEMPERATURE: 98.4 F | OXYGEN SATURATION: 99 % | RESPIRATION RATE: 20 BRPM | WEIGHT: 167.8 LBS | DIASTOLIC BLOOD PRESSURE: 78 MMHG | BODY MASS INDEX: 26.34 KG/M2 | HEART RATE: 75 BPM | HEIGHT: 67 IN

## 2018-03-27 DIAGNOSIS — Z00.00 ROUTINE GENERAL MEDICAL EXAMINATION AT A HEALTH CARE FACILITY: Primary | ICD-10-CM

## 2018-03-27 DIAGNOSIS — Z13.1 SCREENING FOR DIABETES MELLITUS: ICD-10-CM

## 2018-03-27 DIAGNOSIS — Z12.4 SCREENING FOR CERVICAL CANCER: ICD-10-CM

## 2018-03-27 DIAGNOSIS — Z11.51 SCREENING FOR HUMAN PAPILLOMAVIRUS: ICD-10-CM

## 2018-03-27 DIAGNOSIS — Z13.29 SCREENING FOR THYROID DISORDER: ICD-10-CM

## 2018-03-27 DIAGNOSIS — N94.6 DYSMENORRHEA: ICD-10-CM

## 2018-03-27 DIAGNOSIS — Z13.220 SCREENING FOR LIPOID DISORDERS: ICD-10-CM

## 2018-03-27 DIAGNOSIS — Z71.6 ENCOUNTER FOR TOBACCO USE CESSATION COUNSELING: ICD-10-CM

## 2018-03-27 LAB
CHOLEST SERPL-MCNC: 220 MG/DL
HBA1C MFR BLD: 5 % (ref 4.3–6)
HDLC SERPL-MCNC: 57 MG/DL
LDLC SERPL CALC-MCNC: 136 MG/DL
NONHDLC SERPL-MCNC: 163 MG/DL
TRIGL SERPL-MCNC: 133 MG/DL
TSH SERPL DL<=0.005 MIU/L-ACNC: 1.33 MU/L (ref 0.4–4)

## 2018-03-27 PROCEDURE — G0145 SCR C/V CYTO,THINLAYER,RESCR: HCPCS | Performed by: NURSE PRACTITIONER

## 2018-03-27 PROCEDURE — 80061 LIPID PANEL: CPT | Performed by: NURSE PRACTITIONER

## 2018-03-27 PROCEDURE — 99213 OFFICE O/P EST LOW 20 MIN: CPT | Mod: 25 | Performed by: NURSE PRACTITIONER

## 2018-03-27 PROCEDURE — 87624 HPV HI-RISK TYP POOLED RSLT: CPT | Performed by: NURSE PRACTITIONER

## 2018-03-27 PROCEDURE — 84443 ASSAY THYROID STIM HORMONE: CPT | Performed by: NURSE PRACTITIONER

## 2018-03-27 PROCEDURE — 83036 HEMOGLOBIN GLYCOSYLATED A1C: CPT | Performed by: NURSE PRACTITIONER

## 2018-03-27 PROCEDURE — 36415 COLL VENOUS BLD VENIPUNCTURE: CPT | Performed by: NURSE PRACTITIONER

## 2018-03-27 PROCEDURE — 99395 PREV VISIT EST AGE 18-39: CPT | Performed by: NURSE PRACTITIONER

## 2018-03-27 ASSESSMENT — ANXIETY QUESTIONNAIRES
GAD7 TOTAL SCORE: 10
IF YOU CHECKED OFF ANY PROBLEMS ON THIS QUESTIONNAIRE, HOW DIFFICULT HAVE THESE PROBLEMS MADE IT FOR YOU TO DO YOUR WORK, TAKE CARE OF THINGS AT HOME, OR GET ALONG WITH OTHER PEOPLE: NOT DIFFICULT AT ALL
7. FEELING AFRAID AS IF SOMETHING AWFUL MIGHT HAPPEN: MORE THAN HALF THE DAYS
6. BECOMING EASILY ANNOYED OR IRRITABLE: MORE THAN HALF THE DAYS
1. FEELING NERVOUS, ANXIOUS, OR ON EDGE: MORE THAN HALF THE DAYS
3. WORRYING TOO MUCH ABOUT DIFFERENT THINGS: MORE THAN HALF THE DAYS
5. BEING SO RESTLESS THAT IT IS HARD TO SIT STILL: NOT AT ALL
2. NOT BEING ABLE TO STOP OR CONTROL WORRYING: MORE THAN HALF THE DAYS

## 2018-03-27 ASSESSMENT — PATIENT HEALTH QUESTIONNAIRE - PHQ9: 5. POOR APPETITE OR OVEREATING: NOT AT ALL

## 2018-03-27 NOTE — MR AVS SNAPSHOT
After Visit Summary   3/27/2018    Shanell Alfaro    MRN: [id number]           Patient Information     Date Of Birth          1985        Visit Information        Provider Department      3/27/2018 2:00 PM Lisette Plata NP [facility]        Today's Diagnoses     Encounter for tobacco use cessation counseling    -  1    Screening for cervical cancer        Screening for human papillomavirus        Dysmenorrhea        Screening for lipoid disorders        Screening for diabetes mellitus        Screening for thyroid disorder          Care Instructions    Follow up as needed for any health care questions/ concerns.     Preventive Health Recommendations  Female Ages 26 - 39  Yearly exam:   See your health care provider every year in order to    Review health changes.     Discuss preventive care.      Review your medicines if you your doctor has prescribed any.    Until age 30: Get a Pap test every three years (more often if you have had an abnormal result).    After age 30: Talk to your doctor about whether you should have a Pap test every 3 years or have a Pap test with HPV screening every 5 years.   You do not need a Pap test if your uterus was removed (hysterectomy) and you have not had cancer.  You should be tested each year for STDs (sexually transmitted diseases), if you're at risk.   Talk to your provider about how often to have your cholesterol checked.  If you are at risk for diabetes, you should have a diabetes test (fasting glucose).  Shots: Get a flu shot each year. Get a tetanus shot every 10 years.   Nutrition:     Eat at least 5 servings of fruits and vegetables each day.    Eat whole-grain bread, whole-wheat pasta and brown rice instead of white grains and rice.    Talk to your provider about Calcium and Vitamin D.     Lifestyle    Exercise at least 150 minutes a week (30 minutes a day, 5 days of the week). This will help you control your weight and prevent  "disease.    Limit alcohol to one drink per day.    No smoking.     Wear sunscreen to prevent skin cancer.    See your dentist every six months for an exam and cleaning.            Follow-ups after your visit        Follow-up notes from your care team     Return if symptoms worsen or fail to improve.      Future tests that were ordered for you today     Open Future Orders        Priority Expected Expires Ordered    US Pelvic Complete w Transvaginal Routine  3/27/2019 3/27/2018            Who to contact     Normal or non-critical lab and imaging results will be communicated to you by Frame[employer]zzt, letter or phone within 4 business days after the clinic has received the results. If you do not hear from us within 7 days, please contact the clinic through [employer] or phone. If you have a critical or abnormal lab result, we will notify you by phone as soon as possible.  Submit refill requests through [employer] or call your pharmacy and they will forward the refill request to us. Please allow 3 business days for your refill to be completed.          If you need to speak with a  for additional information , please call: [phone]             Additional Information About Your Visit        [employer] Information     [employer] gives you secure access to your electronic health record. If you see a primary care provider, you can also send messages to your care team and make appointments. If you have questions, please call your primary care clinic.  If you do not have a primary care provider, please call [phone] and they will assist you.        Care EveryWhere ID     This is your Care EveryWhere ID. This could be used by other organizations to access your [facility] medical records  [id number]        Your Vitals Were     Pulse Temperature Respirations Height Last Period Pulse Oximetry    75 98.4  F (36.9  C) (Oral) 20 5' 6.53\" (1.69 m) 03/18/2018 (Approximate) 99%    Breastfeeding? BMI (Body Mass Index)                " No 26.65 kg/m2           Blood Pressure from Last 3 Encounters:   03/27/18 126/78   04/27/17 123/78   02/23/17 115/74    Weight from Last 3 Encounters:   03/27/18 167 lb 12.8 oz (76.1 kg)   04/27/17 171 lb (77.6 kg)   02/23/17 174 lb (78.9 kg)              We Performed the Following     DEPRESSION ACTION PLAN (DAP)     Hemoglobin A1c     HPV High Risk Types DNA Cervical     Lipid panel reflex to direct LDL Non-fasting     Pap imaged thin layer screen with HPV - recommended age 30 - 65 years (select HPV order below)     TOBACCO CESSATION - FOR HEALTH MAINTENANCE     TSH with free T4 reflex          Today's Medication Changes          These changes are accurate as of 3/27/18  2:19 PM.  If you have any questions, ask your nurse or doctor.               Start taking these medicines.        Dose/Directions    diclofenac 50 MG EC tablet   Commonly known as:  VOLTAREN   Used for:  Dysmenorrhea   Started by:  Lisette Plata NP        Dose:  50 mg   Take 1 tablet (50 mg) by mouth 3 times daily as needed for moderate pain   Quantity:  60 tablet   Refills:  1            Where to get your medicines      These medications were sent to [id number] - [place], [place] - [address] [facility]  [address], [place] [address]     Phone:  [phone]     diclofenac 50 MG EC tablet                Primary Care Provider Office Phone # Fax #    Radha [place] [facility] [place] [facility] [phone]       [address] [address] [address]        Equal Access to Services     ANNE-MARIE RODRIGUEZ AH: Hadii heidi ku hadasho Soomaali, waaxda luqadaha, qaybta kaalmada adeegyada, bird jason. So [facility] [phone].    ATENCIÓN: Si habla español, tiene a bullock disposición servicios gratuitos de asistencia lingüística. Llame al [phone].    We comply with applicable federal civil rights laws and [place] laws. We do not discriminate on the basis of race, color, national origin,  age, disability, sex, sexual orientation, or gender identity.            Thank you!     Thank you for choosing [facility]  for your care. Our goal is always to provide you with excellent care. Hearing back from our patients is one way we can continue to improve our services. Please take a few minutes to complete the written survey that you may receive in the mail after your visit with us. Thank you!             Your Updated Medication List - Protect others around you: Learn how to safely use, store and throw away your medicines at www.disposemymeds.org.          This list is accurate as of 3/27/18  2:19 PM.  Always use your most recent med list.                   Brand Name Dispense Instructions for use Diagnosis    diclofenac 50 MG EC tablet    VOLTAREN    60 tablet    Take 1 tablet (50 mg) by mouth 3 times daily as needed for moderate pain    Dysmenorrhea

## 2018-03-27 NOTE — LETTER
My Depression Action Plan  Name: Shanell Alfaro   Date of Birth 1985  Date: 3/27/2018    My doctor: Radha Lynn   My clinic: [facility]  [address]  [place] [id number] [phone]          GREEN    ZONE   Good Control    What it looks like:     Things are going generally well. You have normal up s and down s. You may even feel depressed from time to time, but bad moods usually last less than a day.   What you need to do:  1. Continue to care for yourself (see self care plan)  2. Check your depression survival kit and update it as needed  3. Follow your physician s recommendations including any medication.  4. Do not stop taking medication unless you consult with your physician first.           YELLOW         ZONE Getting Worse    What it looks like:     Depression is starting to interfere with your life.     It may be hard to get out of bed; you may be starting to isolate yourself from others.    Symptoms of depression are starting to last most all day and this has happened for several days.     You may have suicidal thoughts but they are not constant.   What you need to do:     1. Call your care team, your response to treatment will improve if you keep your care team informed of your progress. Yellow periods are signs an adjustment may need to be made.     2. Continue your self-care, even if you have to fake it!    3. Talk to someone in your support network    4. Open up your depression survival kit           RED    ZONE Medical Alert - Get Help    What it looks like:     Depression is seriously interfering with your life.     You may experience these or other symptoms: You can t get out of bed most days, can t work or engage in other necessary activities, you have trouble taking care of basic hygiene, or basic responsibilities, thoughts of suicide or death that will not go away, self-injurious behavior.     What you need to do:  1. Call your care  team and request a same-day appointment. If they are not available (weekends or after hours) call your local crisis line, emergency room or 911.            Depression Self Care Plan / Survival Kit    Self-Care for Depression  Here s the deal. Your body and mind are really not as separate as most people think.  What you do and think affects how you feel and how you feel influences what you do and think. This means if you do things that people who feel good do, it will help you feel better.  Sometimes this is all it takes.  There is also a place for medication and therapy depending on how severe your depression is, so be sure to consult with your medical provider and/ or Behavioral Health Consultant if your symptoms are worsening or not improving.     In order to better manage my stress, I will:    Exercise  Get some form of exercise, every day. This will help reduce pain and release endorphins, the  feel good  chemicals in your brain. This is almost as good as taking antidepressants!  This is not the same as joining a gym and then never going! (they count on that by the way ) It can be as simple as just going for a walk or doing some gardening, anything that will get you moving.      Hygiene   Maintain good hygiene (Get out of bed in the morning, Make your bed, Brush your teeth, Take a shower, and Get dressed like you were going to work, even if you are unemployed).  If your clothes don't fit try to get ones that do.    Diet  I will strive to eat foods that are good for me, drink plenty of water, and avoid excessive sugar, caffeine, alcohol, and other mood-altering substances.  Some foods that are helpful in depression are: complex carbohydrates, B vitamins, flaxseed, fish or fish oil, fresh fruits and vegetables.    Psychotherapy  I agree to participate in Individual Therapy (if recommended).    Medication  If prescribed medications, I agree to take them.  Missing doses can result in serious side effects.  I  understand that drinking alcohol, or other illicit drug use, may cause potential side effects.  I will not stop my medication abruptly without first discussing it with my provider.    Staying Connected With Others  I will stay in touch with my friends, family members, and my primary care provider/team.    Use your imagination  Be creative.  We all have a creative side; it doesn t matter if it s oil painting, sand castles, or mud pies! This will also kick up the endorphins.    Witness Beauty  (AKA stop and smell the roses) Take a look outside, even in mid-winter. Notice colors, textures. Watch the squirrels and birds.     Service to others  Be of service to others.  There is always someone else in need.  By helping others we can  get out of ourselves  and remember the really important things.  This also provides opportunities for practicing all the other parts of the program.    Humor  Laugh and be silly!  Adjust your TV habits for less news and crime-drama and more comedy.    Control your stress  Try breathing deep, massage therapy, biofeedback, and meditation. Find time to relax each day.     My support system    Clinic Contact:  Phone number:    Contact 1:  Phone number:    Contact 2:  Phone number:    Sabianism/:  Phone number:    Therapist:  Phone number:    Local crisis center:    Phone number:    Other community support:  Phone number:

## 2018-03-27 NOTE — NURSING NOTE
"Chief Complaint   Patient presents with     Physical       Initial /78  Pulse 75  Temp 98.4  F (36.9  C) (Oral)  Resp 20  Ht 5' 6.53\" (1.69 m)  Wt 167 lb 12.8 oz (76.1 kg)  LMP 03/18/2018 (Approximate)  SpO2 99%  Breastfeeding? No  BMI 26.65 kg/m2 Estimated body mass index is 26.65 kg/(m^2) as calculated from the following:    Height as of this encounter: 5' 6.53\" (1.69 m).    Weight as of this encounter: 167 lb 12.8 oz (76.1 kg).  Medication Reconciliation: complete     Zaida Champion MA  "

## 2018-03-28 ASSESSMENT — PATIENT HEALTH QUESTIONNAIRE - PHQ9: SUM OF ALL RESPONSES TO PHQ QUESTIONS 1-9: 4

## 2018-03-28 ASSESSMENT — ANXIETY QUESTIONNAIRES: GAD7 TOTAL SCORE: 10

## 2018-03-29 LAB
COPATH REPORT: NORMAL
PAP: NORMAL

## 2018-04-02 LAB
FINAL DIAGNOSIS: NORMAL
HPV HR 12 DNA CVX QL NAA+PROBE: NEGATIVE
HPV16 DNA SPEC QL NAA+PROBE: NEGATIVE
HPV18 DNA SPEC QL NAA+PROBE: NEGATIVE
SPECIMEN DESCRIPTION: NORMAL
SPECIMEN SOURCE CVX/VAG CYTO: NORMAL

## 2018-04-02 NOTE — PROGRESS NOTES
Justino Reece,    Thank you for your recent office visit.    Here are your recent results.  Your labs are in the normal range, I am including tips to improve your cholesterol.  Follow up for repeat fasting labs in one year, or as needed.     Feel free to contact me via [employer] or call the clinic at [phone].    Sincerely,    Lisette Plata, APRN, FNP-BC                     Controlling Cholesterol  What is cholesterol?   Cholesterol is a fatty substance. It has both good and bad effects on the body. Your body needs small amounts of cholesterol to make hormones and to build and maintain cells. However, when your body has too much cholesterol, deposits of fat called plaque form inside the walls of your blood vessels (arteries). The blood vessel walls thicken and the vessels become narrower. This is a condition called atherosclerosis. These changes make it harder for blood to flow through the blood vessels, increasing your risk of heart disease, heart attack, and stroke. The plaque can also easily break off and cause a blockage. When the artery is blocked, no blood can flow through it. This prevents the heart muscle from getting oxygen and can cause a heart attack. If a piece of plaque breaks off and flows to the brain, it can cause a stroke.   Most of the cholesterol in your blood is made by your liver from the fats, carbohydrates, and proteins you eat. You also get cholesterol by eating animal products such as meat, eggs, and high-fat dairy products such as whole milk, cream, and real butter.   It is important to find out what your cholesterol numbers are because lowering cholesterol levels that are too high lessens your risk for developing heart disease. It reduces the chance of a heart attack or death from heart disease, even if you already have heart disease.   How is cholesterol measured?   When you get your cholesterol checked, your healthcare provider will give you a number for your total cholesterol  "level. You can use the chart below to see if your total cholesterol is high.     Total Cholesterol Level (mg/dL)  ----------------------------------------  less than 200   good  200 to 239      borderline high  240 or above    high  ----------------------------------------    When your total cholesterol is measured and found to be high, your healthcare provider may also check the amount of LDL (low-density lipoprotein) and HDL (high-density lipoprotein) in your blood. LDL and HDL carry cholesterol through your blood. LDL carries a lot of cholesterol, leaves behind fatty deposits on your artery walls, and contributes to heart disease. HDL does the opposite. HDL cleans the artery walls and removes extra cholesterol from the body, thus lowering the risk of heart disease. LDL cholesterol is called bad cholesterol. (You can think of \"L\" for \"lousy\" cholesterol.) HDL cholesterol is called good cholesterol (think of \"H\" for \"healthy\" cholesterol). It is good to have low levels of LDL and high levels of HDL.   Because HDL cholesterol protects against heart disease, higher numbers are better. HDL levels of 60 mg/dL or more help to lower your risk for heart disease. A level equal to or less than 40 mg/dL is low and is considered a major risk factor because it increases your risk for developing heart disease   The level of LDL cholesterol that is healthy for you depends on your risk of heart disease and heart attack. In general, the higher your LDL level and the more risk factors you have for heart disease, the greater your chances of developing heart disease or having a heart attack. These are the recommended goals for LDL, according to risk level:   The goal is less than 160 mg/dL if your risk of heart disease is low.   The goal is less than 130 mg/dL if you have a moderate risk.   The goal is less than 100 mg/dL if you have a high risk of heart disease or you already have heart disease or diabetes.   For many people with " heart disease, especially if they also have diabetes, the goal is less than 70 mg/dL.   Lowering cholesterol, especially the LDL, is connected or linked with:   Slowing, stopping, or even reversing the buildup of plaque   Reducing the chances of heart attack by making plaques more stable and less likely to break off or rupture.   This means the chance of having a heart attack is much less.   In addition to high levels of total cholesterol and LDL, major risks for heart disease include:   diabetes   cigarette smoking   high blood pressure (140/90 mm Hg or higher or you are taking blood pressure medicine)   low HDL cholesterol (less than 40 mg/dL)   family history of early heart disease (father or brother diagnosed with heart disease before age 55, or mother or sister diagnosed before age 65)   age 45 or older for men and age 55 or older for women.   If you have diabetes, your risk of heart disease is high. If you do not have diabetes but you have 2 or more of the other risk factors in this list, your risk is moderate to high. Based on your personal and family history, your healthcare provider can help you calculate your risk level.   How can I control my cholesterol level?   High cholesterol may run in families. Know your family history and discuss it with your healthcare provider.   You can often control cholesterol levels by   eating right   exercising   not smoking   losing weight if you are overweight.   If you have a high risk for heart disease, your healthcare provider may prescribe cholesterol-lowering medicine as well as changes in lifestyle.   Eat right.  Follow these diet guidelines to help control your cholesterol:   Limit the cholesterol in your diet to less than 300 mg per day. If you have heart disease, limit cholesterol to less than 200 mg per day.   Be careful about the amounts and types of fat that you eat. Fats should contribute no more than 25 to 35% of your daily calories. Most of your dietary fat  "should be from polyunsaturated and monounsaturated fats, which are healthier than saturated fat and trans fats.   Saturated fat raises your blood cholesterol because it makes it hard for the body to clear the cholesterol away. Less than 7 to 10% of your calories should come from saturated fat. Saturated fat is found in different amounts in almost all foods. Butter, some oils, meat, and poultry fat contain a lot of saturated fat.   Trans fatty acids, often called trans fats, tend to raise your bad LDL cholesterol and lower your good HDL cholesterol. Trans fats naturally occur in some foods, mostly in meat and dairy products. But food makers can create trans fats when they are preparing food for grocery stores. This is usually done by adding hydrogen to fats. If the list of ingredients of a food product includes the words \"partially hydrogenated\" (usually referring to oils, such as soybean oil and others), the product is likely to contain trans fats. Try to eat as little trans fat as possible. As of January 2006, nutrition labels must list trans fats if the food contains them. Check the nutrition bar on the side of the package.   Polyunsaturated fats are found in fish and some vegetable oils. Monounsaturated fats are found in olive oil, canola oil, and avocados. Both types of these healthier fats are also found in many nuts and legumes.   Adjust the amount of calories you eat and exercise regularly, according to your healthcare provider's exercise prescription, to maintain your recommended body weight.   To control the cholesterol and types and amounts of fat you eat:   Check food labels for fat and cholesterol content. Choose the foods with less fat per serving.   Limit the amount of butter and margarine you eat.   Use olive, canola, sunflower, safflower, soybean, or corn oil. Avoid tropical oils such as palm or coconut oil. Also avoid oils that have been hydrogenated or partially hydrogenated.   Use salad dressings " and margarine made with polyunsaturated and monounsaturated fats.   Use egg whites or egg substitutes rather than whole eggs.   Replace whole-milk dairy products with nonfat or low-fat milk, cheese, spreads, and yogurt.   Eat skinless chicken, turkey, fish, and meatless entrees more often than red meat.   Choose lean cuts of meat and trim off all visible fat. Keep portion sizes moderate.   Avoid fatty desserts such as ice cream, cream-filled cakes, and cheesecakes. Choose fresh fruits, nonfat frozen yogurt, Popsicles, etc.   Reduce the amount of fried foods, vending machine food, and fast food you eat.   Eat several daily servings of fruits and vegetables (especially fresh fruits and leafy vegetables), beans, and whole grains (such as whole wheat, bran, brown rice, oats, and oatmeal). The fiber in these foods helps lower cholesterol.   Eat 4 to 5 servings of nuts a week. Examples of nuts that can be a part of a healthy diet are walnuts, almonds, hazelnuts, peanuts, pecans, and pistachio nuts.   Look for low-fat or nonfat varieties of the foods you like to eat, or look for substitutes.   Exercise.  Exercise goes hand-in-hand with a healthy diet for controlling cholesterol. Exercise helps because it:   Keeps your weight down.   Decreases your total cholesterol level.   Decreases your LDL (bad cholesterol).   Increases your HDL (good cholesterol).   A good exercise program includes aerobic exercise. Aerobic exercise is any activity that keeps your heart rate up (such as swimming, jogging, walking, and bicycling). You should get at least 30 minutes of moderate aerobic exercise most days of the week. Moderate aerobic exercise is generally defined as requiring the energy it takes to walk 2 miles in 30 minutes. You may need to exercise 60 minutes a day to prevent weight gain and 90 minutes a day to lose weight.   If you haven't been exercising, ask your healthcare provider for an exercise prescription and start your new  "exercise program slowly.   Don't smoke.  Do not smoke. Smoking increases your risk of heart disease because it lowers HDL levels, increases your risk of blood clots, and decreases oxygen to the tissues.   Lose excess weight.  Extra weight increases your risk for heart and blood vessel disease. One way it does this is by causing your LDL (\"bad\") cholesterol to go up. Extra weight can also make you tired. It takes a lot of energy to carry all those pounds around. The result is that you are less active. This can mean that you don't get enough exercise and gain even more weight.   Losing excess weight:   Improves not only the bad LDL cholesterol but other blood fats as well.   Lowers your risk for heart attack or stroke.   Increases your energy and helps you feel better (both physically and mentally) and become more active.   Your weight is primarily the result of 2 factors. One is the number of calories you consume. The other is the number of calories you \"burn\". If you eat more calories than you use, your body will store the extra calories as fat and your weight will go up. If your body uses more calories than you eat, you will lose weight.   Here are some things you can do to lose weight.   Talk to your healthcare provider about your weight. Ask how a change in diet and exercise will change your cholesterol levels. Plan for gradual weight loss, just 1 or 2 pounds per week   Eat fewer calories.   Get more physical activity.   Keep a diary of your food and exercise for a couple of weeks to become more aware of your habits.   In summary, changes that you can make in your lifestyle to control your cholesterol level are:   Eat healthy.   Get regular exercise.   Don't smoke.   Keep a healthy weight.   Have your cholesterol levels checked as often as your provider recommends.   Cholesterol in the Diet  Cholesterol is a fatty substance in your body and in foods made from animals. There is a lot of it in meat, including beef, " pork, chicken, and turkey. Whole-milk dairy products, egg yolks, and shellfish also have a lot of cholesterol.   Your body needs cholesterol to make hormones and build nerve cells. You don't have to get it from food because your body makes cholesterol. If you eat too many foods high in cholesterol or saturated fat you can get too much cholesterol. It can cause high levels of cholesterol in the blood. High cholesterol increases your risk for heart disease.   How are saturated fat and trans fats related to cholesterol levels?   Like cholesterol, saturated fats are found mostly in animal products. Limiting the saturated fat in your diet is just as important as limiting cholesterol because your body makes more cholesterol when you eat saturated fat. Trans fats are another type of fat in animal products. Trans fats are also in many processed foods, such as cakes, cookies and potato chips. Like saturated fat, trans fats raise cholesterol levels in the blood.   How much cholesterol do animal products have?   As the table below shows, some foods have more cholesterol and saturated fat than others. They may be high in both, or low in both, or high in one but not the other. The healthiest diets include mostly foods that are low in cholesterol, saturated, and trans fat.   Most foods in the meat group have about the same amount of cholesterol per serving, regardless of the type or cut of meat. However, the amount of saturated fat in these various meats can be very different. High-fat cuts, such as prime rib and dark-meat poultry with the skin, contain a lot more saturated fat than lean cuts, such as pork tenderloin and chicken breast without skin.   Whole-milk dairy products, such as whole milk, cheese, ice cream, sour cream, and butter, have a lot of cholesterol and saturated fat. The good news is that food producers can remove both cholesterol and saturated fat from dairy foods. When dairy is skimmed of its fats, the  cholesterol is skimmed off along with it. Skim (nonfat) dairy products are a healthy food choice.   Shellfish are low in saturated fat. Some shellfish are high in cholesterol, but the saturated fat is so low that these foods are still healthy. Fin fish, such as salmon, tuna, trout, and halibut, are relatively low in cholesterol and saturated fat.   Cholesterol and Saturated Fat Content of Selected Foods     Food                                 Fat             Cholesterol                                      (grams)         (milligrams)  --------------------------------------------------------------------  8 ounces (oz) whole milk             4.5 g               25 mg  8 ounces skim milk                   0.36 g               5 mg  1 tablespoon butter                  7 g                 30 mg  4 tablespoon sour cream              5.5 g               24 mg  3 oz. pork tenderloin                2 g                 65 mg  3 oz. pork sausage                   7.5 g               70 mg  3 oz sirloin steak                   3 g                 76 mg  3 oz beef ribs                       5 g                 69 mg  3 oz chicken breast without skin     1 g                 73 mg  3 oz chicken thigh with skin         3.7 g               79 mg  3 oz shrimp                          0.25               166 mg  3 oz salmon                          1.5                 50 mg  1/2 cup vegetable shortening        25.5 g                0 mg   ---------------------------------------------------------------------    How much cholesterol can I have in my diet?   The guidelines for cholesterol in the diet depend on your medical condition. The recommendations are:   less than 200 mg a day if you have high cholesterol or heart disease   less than 300 mg of cholesterol a day if you do not have high cholesterol or heart disease.   Everyone should try to avoid saturated and trans fats.   Limiting cholesterol, saturated fat, and trans fat is easy  if you get in the habit of cooking lean. Choose the leanest cuts of meats and dairy products, including more fish and less processed food. Some plant foods, such as palm oil, coconut oil, and cocoa butter do contain saturated fat, but it is not known if these fats have the same harmful effect on the heart as the saturated fat in animal products. Plant foods, such as grains, fruits, vegetables, vegetable oils, nuts, and seeds, do not contain any cholesterol.     Published by [employer].  This content is reviewed periodically and is subject to change as new health information becomes available. The information is intended to inform and educate and is not a replacement for medical evaluation, advice, diagnosis or treatment by a healthcare professional.   Migdalia Olivo RD, CDE   ? 2010 [facility] and/or its affiliates. All Rights Reserved.

## 2018-04-18 ENCOUNTER — RADIANT APPOINTMENT (OUTPATIENT)
Dept: ULTRASOUND IMAGING | Facility: CLINIC | Age: 33
End: 2018-04-18
Attending: NURSE PRACTITIONER
Payer: COMMERCIAL

## 2018-04-18 DIAGNOSIS — N94.6 DYSMENORRHEA: ICD-10-CM

## 2018-04-18 PROCEDURE — 76856 US EXAM PELVIC COMPLETE: CPT

## 2018-04-18 PROCEDURE — 76830 TRANSVAGINAL US NON-OB: CPT

## 2018-04-19 NOTE — PROGRESS NOTES
Justino Hernandez,    Thank you for your recent office visit.    Here are your recent results.  Your pelvic ultrasound did show several Uterine myomas (fibroids).  Since you are having abnormal periods with this finding, I will place an order for you to follow up with OB/GYN.     Feel free to contact me via [facility] or call the clinic at [phone].    Sincerely,    Lisette Plata, APRN, FNP-BC    Uterine Fibroids    Fibroids are lumps (growths) of muscle tissue. They can form in the wall of uterus (womb). Fibroids are very common. They are almost always benign (noncancerous). It is not known what causes fibroids. But they may run in families. Also, changes in female hormones may cause them to grow over time. After menopause, fibroids may stop growing, shrink, or go away.  Fibroids may or may not cause symptoms. This depends on many factors, such as the size, number, and locations of the fibroids. If symptoms do occur, they can include:    Heavy bleeding (in severe cases, this may lead to a problem called anemia) or painful periods    Feeling of fullness, swelling, pressure, or pain in the lower belly (abdomen) or pelvic region    Lower back pain    Frequent urination    Constipation    Pain during sex    Problems getting pregnant  If fibroids are suspected, an evaluation will be done to help understand the extent of the problem. This can include a health history, exam, and tests. Based on the results, treatment can then be planned if needed.  Until more details are known about your problem, you may be given guidelines similar to the home care instructions below.      Home care    To help control pain, over-the-counter pain medicine may be advised. Take these only as directed by your provider.    If you have heavy or painful periods, keep a log of your menstrual cycle. Show the log to your provider. The information may help him or her determine if your symptoms are due to fibroids or another cause.    Make certain  lifestyle changes. This may include losing excess weight, being more active, or eating less red meat. These changes may help lower the risk of fibroids in some people. They may also help improve overall health.  Follow-up care  Follow up with your healthcare provider as advised. If testing was done, you'll be told the results when they are ready. Treatment options for fibroids may include medicines or procedures to help shrink or keep fibroids from growing. In severe cases, surgery may be needed to remove fibroids or to remove the uterus. If you have fibroids but no symptoms, you may not need treatment at all. However, your provider may advise regular follow-up to check fibroid size and growth.  When to seek medical advice  Call your healthcare provider right away if any of these occur:    Heavy bleeding or painful periods that continue or don't get better with treatment    Signs of anemia such as extreme fatigue, pale skin, shortness of breath with little exertion, or rapid heartbeat    Weakness, dizziness, or fainting    Severe pain in the pelvic or belly region    Swollen or enlarged belly  Date Last Reviewed: 10/1/2017    8280-2490 [employer]. [address] [place] [address]. All rights reserved. This information is not intended as a substitute for professional medical care. Always follow your healthcare professional's instructions.      Uterine Fibroid Embolization    Uterine fibroid embolization is a procedure that is done to shrink a fibroid. Fibroids are benign (not cancerous) growths of muscle tissue on or inside the uterus. This procedure stops the fibroid's blood supply. It is often done by a specially trained doctor called an interventional radiologist. This type of doctor is trained and certified by the American Board of Radiology to use minimally invasive image-guided procedures to diagnose and treat diseases.   Before your procedure  To get ready for your procedure:    Follow  any directions you're given for not eating or drinking before the procedure.    Tell your healthcare provider if you are allergic to any foods, medicines, or X-ray dye (contrast medium).    Tell your healthcare provider about any recent illnesses or if you have medical conditions  Tell your healthcare provider about any medicines you are taking. You may need to stop taking all or some of these before the procedure. This includes:    All prescription medicines    Herbs, vitamins, and other supplements    Over-the-counter medicines such as aspirin or ibuprofen    Street drugs  During your procedure    You will lie on an X-ray table. An IV line is put into a vein in your arm or hand. This line gives you fluids and medicines. You may be given medicine to relax you and make you sleepy.    Medicine will be put on the skin on your groin to numb it. Then a small cut or incision is made. A needle attached to a thin wire is put through the incision. The wire is put into a blood vessel near your groin.    A thin, flexible tube called a catheter is placed over the guide wire into the blood vessel.    X-ray dye is injected through the catheter. This helps the blood vessels and catheter show up better on X-ray images. The catheter's movement can be seen on a computer screen.    The radiologist uses X-ray images as a guide. He or she moves the catheter through the blood vessel. It is moved into the artery that supplies blood to your uterus.    The catheter is moved near the fibroid. The radiologist then injects tiny grains of plastic or spongy material into the artery. These grains flow to the smaller blood vessels that supply the fibroid. Blood flow to these vessels is blocked. The procedure is done again on the other side of your uterus.    The entire procedure takes about 1 to 2 hours.  After your procedure  You may stay in the hospital overnight. You will likely feel pain and cramping for up to 1 week. Medicines will be  prescribed to help control this pain. Some vaginal bleeding, also called spotting, is common for a few days. You may feel tired and have an upset stomach and a fever for a few days after the procedure. During your recovery, care for the incision as directed. You may be able to return to work 1 to 2 weeks after the procedure. Your healthcare provider will tell you more.  Possible risks and complications  All procedures have some risk. Possible risks of uterine fibroid embolization include:    Infection or bruising around the catheter insertion site    Blood clot in a blood vessel    Problems because of X-ray dye. These include allergic reaction or kidney damage.    Not being able to have a baby (infertility), or going into early menopause.    Injury to the uterus. This might require a procedure to remove tissue from the uterus (dilation and curettage). Or the uterus itself might need to be removed (a hysterectomy).    Exposure to X-ray radiation. This is typically low level and considered safe.    Fibroid symptoms may come back. Or new fibroids can develop in the future.  Experts don't know how this procedure affects a woman's ability to have a baby (fertility) in the long term. They also don't know how it affects future pregnancies.  Date Last Reviewed: 11/1/2017 2000-2017 [employer]. [address] [place] [address], [place] [address]. All rights reserved. This information is not intended as a substitute for professional medical care. Always follow your healthcare professional's instructions.      Uterine Fibroid Embolization    Uterine fibroid embolization is a procedure that is done to shrink a fibroid. Fibroids are benign (not cancerous) growths of muscle tissue on or inside the uterus. This procedure stops the fibroid's blood supply. It is often done by a specially trained doctor called an interventional radiologist. This type of doctor is trained and certified by the American Board of Radiology to use  minimally invasive image-guided procedures to diagnose and treat diseases.   Before your procedure  To get ready for your procedure:    Follow any directions you're given for not eating or drinking before the procedure.    Tell your healthcare provider if you are allergic to any foods, medicines, or X-ray dye (contrast medium).    Tell your healthcare provider about any recent illnesses or if you have medical conditions  Tell your healthcare provider about any medicines you are taking. You may need to stop taking all or some of these before the procedure. This includes:    All prescription medicines    Herbs, vitamins, and other supplements    Over-the-counter medicines such as aspirin or ibuprofen    Street drugs  During your procedure    You will lie on an X-ray table. An IV line is put into a vein in your arm or hand. This line gives you fluids and medicines. You may be given medicine to relax you and make you sleepy.    Medicine will be put on the skin on your groin to numb it. Then a small cut or incision is made. A needle attached to a thin wire is put through the incision. The wire is put into a blood vessel near your groin.    A thin, flexible tube called a catheter is placed over the guide wire into the blood vessel.    X-ray dye is injected through the catheter. This helps the blood vessels and catheter show up better on X-ray images. The catheter's movement can be seen on a computer screen.    The radiologist uses X-ray images as a guide. He or she moves the catheter through the blood vessel. It is moved into the artery that supplies blood to your uterus.    The catheter is moved near the fibroid. The radiologist then injects tiny grains of plastic or spongy material into the artery. These grains flow to the smaller blood vessels that supply the fibroid. Blood flow to these vessels is blocked. The procedure is done again on the other side of your uterus.    The entire procedure takes about 1 to 2  hours.  After your procedure  You may stay in the hospital overnight. You will likely feel pain and cramping for up to 1 week. Medicines will be prescribed to help control this pain. Some vaginal bleeding, also called spotting, is common for a few days. You may feel tired and have an upset stomach and a fever for a few days after the procedure. During your recovery, care for the incision as directed. You may be able to return to work 1 to 2 weeks after the procedure. Your healthcare provider will tell you more.  Possible risks and complications  All procedures have some risk. Possible risks of uterine fibroid embolization include:    Infection or bruising around the catheter insertion site    Blood clot in a blood vessel    Problems because of X-ray dye. These include allergic reaction or kidney damage.    Not being able to have a baby (infertility), or going into early menopause.    Injury to the uterus. This might require a procedure to remove tissue from the uterus (dilation and curettage). Or the uterus itself might need to be removed (a hysterectomy).    Exposure to X-ray radiation. This is typically low level and considered safe.    Fibroid symptoms may come back. Or new fibroids can develop in the future.  Experts don't know how this procedure affects a woman's ability to have a baby (fertility) in the long term. They also don't know how it affects future pregnancies.  Date Last Reviewed: 11/1/2017 2000-2017 [employer]. [address] [place] [address]. All rights reserved. This information is not intended as a substitute for professional medical care. Always follow your healthcare professional's instructions.

## 2018-04-24 ENCOUNTER — OFFICE VISIT (OUTPATIENT)
Dept: FAMILY MEDICINE | Facility: CLINIC | Age: 33
End: 2018-04-24
Payer: COMMERCIAL

## 2018-04-24 VITALS
DIASTOLIC BLOOD PRESSURE: 78 MMHG | BODY MASS INDEX: 26.41 KG/M2 | SYSTOLIC BLOOD PRESSURE: 110 MMHG | WEIGHT: 168.25 LBS | HEIGHT: 67 IN | HEART RATE: 76 BPM

## 2018-04-24 DIAGNOSIS — D25.9 UTERINE LEIOMYOMA, UNSPECIFIED LOCATION: Primary | ICD-10-CM

## 2018-04-24 DIAGNOSIS — N94.6 DYSMENORRHEA: ICD-10-CM

## 2018-04-24 PROCEDURE — 99214 OFFICE O/P EST MOD 30 MIN: CPT | Performed by: FAMILY MEDICINE

## 2018-04-24 RX ORDER — TRAMADOL HYDROCHLORIDE 50 MG/1
50 TABLET ORAL EVERY 6 HOURS PRN
Qty: 20 TABLET | Refills: 0 | Status: SHIPPED | OUTPATIENT
Start: 2018-04-24 | End: 2018-06-25

## 2018-04-24 NOTE — NURSING NOTE
"Chief Complaint   Patient presents with     RECHECK       Initial /78  Pulse 76  Ht 5' 6.53\" (1.69 m)  Wt 168 lb 4 oz (76.3 kg)  LMP 04/16/2018 (Exact Date)  Breastfeeding? No  BMI 26.73 kg/m2 Estimated body mass index is 26.73 kg/(m^2) as calculated from the following:    Height as of this encounter: 5' 6.53\" (1.69 m).    Weight as of this encounter: 168 lb 4 oz (76.3 kg).  Medication Reconciliation: complete  "

## 2018-04-24 NOTE — PROGRESS NOTES
"  SUBJECTIVE:   Shanell Alfaro is a 32 year old female who presents to clinic today for the following health issues:    - Follow up after completing US 4/18/2018. She is taking Voltaren 50mg tid prn without improvement. She is having continued cramping. LMP: 4/16/2018.      ROS:  Constitutional, HEENT, cardiovascular, pulmonary, gi and gu systems are negative, except as otherwise noted.      OBJECTIVE:     /78  Pulse 76  Ht 5' 6.53\" (1.69 m)  Wt 168 lb 4 oz (76.3 kg)  LMP 04/16/2018 (Exact Date)  Breastfeeding? No  BMI 26.73 kg/m2  Body mass index is 26.73 kg/(m^2).     O:  gen: in NAD  Resp: clear, no wheezes, rales, or rhonchi.  CV:  RRR without murmur  GI:  soft, nontender, no HSM   PSY: alert, pleasant, mood and affect appropriate.       PELVIC ULTRASOUND TRANSABDOMINAL IMAGING  AND TRANSVAGINAL IMAGING   4/18/2018 1:26 PM     HISTORY: Dysmenorrhea.     COMPARISON: None.     TECHNIQUE: Transabdominal imaging was performed. Transvaginal imaging  was also performed to better evaluate the myometrium.     FINDING: The uterus measures 9.3 x 4.5 x 5.1 cm. There are numerous  uterine myomas. The largest is in the posterior aspect of the uterine  fundus measuring 2.3 cm. No other myometrial abnormality is  demonstrated. The endometrium is normal measuring 1.1 cm. The right  ovary is normal. The left ovary is normal. Normal blood flow is seen  in both ovaries. No adnexal pathology is seen. There is no free fluid  in the cul-de-sac.         IMPRESSION: Multiple uterine myomas.     DEBI HARRSI MD        ASSESSMENT/PLAN:     (D25.9) Uterine leiomyoma, unspecified location  (primary encounter diagnosis)  Comment: Reviewed ultrasound findings, discussed treatment options.  She is contemplating pregnancy, treatment such as Depo-Provera may not be appropriate, I reviewed she could still consider an IUD.  Ultimately, I states she would benefit from gynecology consultation.  Limited use of Ultram, will " refill.  Reviewed side effects of the medication, especially sedation and constipation.  Plan: OB/GYN REFERRAL, traMADol (ULTRAM) 50 MG tablet      (N94.6) Dysmenorrhea  Plan: OB/GYN REFERRAL, traMADol (ULTRAM) 50 MG tablet    Patient Instructions   *   Next step, see our gynecologist, Dr. Brush. Call his office: [phone].     *   I don't see a lot of options, but need more information.     *   For pain, try a stronger medication called tramadol, may combine with ibuprofen.      total time spent with pt. was 25 minutes, 20 minutes of the visit was spent discussing the above issues, including pathophysiology, treatment options, and expected outcomes.         Patient will follow up in 12 months or sooner, PRN. Patient instructed to call with any questions or concerns.    Bri De La Cruz MD  [facility]

## 2018-04-24 NOTE — MR AVS SNAPSHOT
After Visit Summary   4/24/2018    Shanell Alfaro    MRN: [id number]           Patient Information     Date Of Birth          1985        Visit Information        Provider Department      4/24/2018 10:20 AM Bri De La Cruz MD [facility]        Today's Diagnoses     Uterine leiomyoma, unspecified location    -  1    Dysmenorrhea          Care Instructions    *   Next step, see our gynecologist, Dr. Brush. Call his office: [phone].     *   I don't see a lot of options, but need more information.     *   For pain, try a stronger medication called tramadol, may combine with ibuprofen.               Follow-ups after your visit        Additional Services     OB/GYN REFERRAL       Your provider has referred you to:  FM: [facility] [phone]   [email or web address]  Dr. Brush.     Please be aware that coverage of these services is subject to the terms and limitations of your health insurance plan.  Call member services at your health plan with any benefit or coverage questions.      Please bring the following with you to your appointment:    (1) Any X-Rays, CTs or MRIs which have been performed.  Contact the facility where they were done to arrange for  prior to your scheduled appointment.   (2) List of current medications   (3) This referral request   (4) Any documents/labs given to you for this referral                  Who to contact     Normal or non-critical lab and imaging results will be communicated to you by MyChart, letter or phone within 4 business days after the clinic has received the results. If you do not hear from us within 7 days, please contact the clinic through MyChart or phone. If you have a critical or abnormal lab result, we will notify you by phone as soon as possible.  Submit refill requests through [facility] or call your pharmacy and they will forward the refill request to us. Please  "allow 3 business days for your refill to be completed.          If you need to speak with a  for additional information , please call: [phone]           Additional Information About Your Visit        [employer] Information     [employer] gives you secure access to your electronic health record. If you see a primary care provider, you can also send messages to your care team and make appointments. If you have questions, please call your primary care clinic.  If you do not have a primary care provider, please call [phone] and they will assist you.        Care EveryWhere ID     This is your Care EveryWhere ID. This could be used by other organizations to access your [facility] medical records  [id number]        Your Vitals Were     Pulse Height Last Period Breastfeeding? BMI (Body Mass Index)       76 5' 6.53\" (1.69 m) 04/16/2018 (Exact Date) No 26.73 kg/m2        Blood Pressure from Last 3 Encounters:   04/24/18 110/78   03/27/18 126/78   04/27/17 123/78    Weight from Last 3 Encounters:   04/24/18 168 lb 4 oz (76.3 kg)   03/27/18 167 lb 12.8 oz (76.1 kg)   04/27/17 171 lb (77.6 kg)              We Performed the Following     OB/GYN REFERRAL          Today's Medication Changes          These changes are accurate as of 4/24/18 10:27 AM.  If you have any questions, ask your nurse or doctor.               Start taking these medicines.        Dose/Directions    traMADol 50 MG tablet   Commonly known as:  ULTRAM   Used for:  Uterine leiomyoma, unspecified location, Dysmenorrhea   Started by:  Bri De La Cruz MD        Dose:  50 mg   Take 1 tablet (50 mg) by mouth every 6 hours as needed for severe pain   Quantity:  20 tablet   Refills:  0            Where to get your medicines      Some of these will need a paper prescription and others can be bought over the counter.  Ask your nurse if you have questions.     Bring a paper prescription for each of these medications     traMADol 50 MG tablet       "         Information about OPIOIDS     PRESCRIPTION OPIOIDS: WHAT YOU NEED TO KNOW   You have a prescription for an opioid (narcotic) pain medicine. Opioids can cause addiction. If you have a history of chemical dependency of any type, you are at a higher risk of becoming addicted to opioids. Only take this medicine after all other options have been tried. Take it for as short a time and as few doses as possible.     Do not:    Drive. If you drive while taking these medicines, you could be arrested for driving under the influence (DUI).    Operate heavy machinery    Do any other dangerous activities while taking these medicines.     Drink any alcohol while taking these medicines.      Take with any other medicines that contain acetaminophen. Read all labels carefully. Look for the word  acetaminophen  or  Tylenol.  Ask your pharmacist if you have questions or are unsure.    Store your pills in a secure place, locked if possible. We will not replace any lost or stolen medicine. If you don t finish your medicine, please throw away (dispose) as directed by your pharmacist. The [place] Pollution Control Agency has more information about safe disposal: https://www.pca.[facility].[place].us/living-green/managing-unwanted-medications    All opioids tend to cause constipation. Drink plenty of water and eat foods that have a lot of fiber, such as fruits, vegetables, prune juice, apple juice and high-fiber cereal. Take a laxative (Miralax, milk of magnesia, Colace, Senna) if you don t move your bowels at least every other day.          Primary Care Provider Office Phone # Fax #    Lisette Plata [phone] [address] [facility] [address]        Equal Access to Services     [facility] : Hadelizabeth brock Sofaraz, waaxda luqadaha, qaybta kaalmada bird metcalf. So [facility] [phone].    ATENCIÓN: Si habla español, tiene a bullock disposición servicios gratuitos de  asistencia lingüística. Richard al [phone].    We comply with applicable federal civil rights laws and [place] laws. We do not discriminate on the basis of race, color, national origin, age, disability, sex, sexual orientation, or gender identity.            Thank you!     Thank you for choosing [facility]  for your care. Our goal is always to provide you with excellent care. Hearing back from our patients is one way we can continue to improve our services. Please take a few minutes to complete the written survey that you may receive in the mail after your visit with us. Thank you!             Your Updated Medication List - Protect others around you: Learn how to safely use, store and throw away your medicines at www.disposemymeds.org.          This list is accurate as of 4/24/18 10:27 AM.  Always use your most recent med list.                   Brand Name Dispense Instructions for use Diagnosis    diclofenac 50 MG EC tablet    VOLTAREN    60 tablet    Take 1 tablet (50 mg) by mouth 3 times daily as needed for moderate pain    Dysmenorrhea       traMADol 50 MG tablet    ULTRAM    20 tablet    Take 1 tablet (50 mg) by mouth every 6 hours as needed for severe pain    Uterine leiomyoma, unspecified location, Dysmenorrhea

## 2018-04-24 NOTE — PATIENT INSTRUCTIONS
*   Next step, see our gynecologist, Dr. Brush. Call his office: [phone].     *   I don't see a lot of options, but need more information.     *   For pain, try a stronger medication called tramadol, may combine with ibuprofen.

## 2018-04-25 ENCOUNTER — OFFICE VISIT (OUTPATIENT)
Dept: OBGYN | Facility: CLINIC | Age: 33
End: 2018-04-25
Payer: COMMERCIAL

## 2018-04-25 VITALS
BODY MASS INDEX: 26.27 KG/M2 | TEMPERATURE: 97.9 F | DIASTOLIC BLOOD PRESSURE: 74 MMHG | HEIGHT: 67 IN | SYSTOLIC BLOOD PRESSURE: 98 MMHG | WEIGHT: 167.4 LBS

## 2018-04-25 DIAGNOSIS — N92.0 MENORRHAGIA WITH REGULAR CYCLE: Primary | ICD-10-CM

## 2018-04-25 DIAGNOSIS — N94.6 DYSMENORRHEA: ICD-10-CM

## 2018-04-25 DIAGNOSIS — D25.9 UTERINE LEIOMYOMA, UNSPECIFIED LOCATION: ICD-10-CM

## 2018-04-25 PROCEDURE — 99214 OFFICE O/P EST MOD 30 MIN: CPT | Performed by: OBSTETRICS & GYNECOLOGY

## 2018-04-25 NOTE — MR AVS SNAPSHOT
After Visit Summary   4/25/2018    Shanell Alfaro    MRN: [id number]           Patient Information     Date Of Birth          1985        Visit Information        Provider Department      4/25/2018 2:45 PM Lizette Hummel MD [facility]        Today's Diagnoses     Menorrhagia with regular cycle    -  1    Dysmenorrhea        Uterine leiomyoma, unspecified location           Follow-ups after your visit        Your next 10 appointments already scheduled     May 17, 2018  2:30 PM CDT   Office Visit with Rolando Brush MD   [facility]    [address] [address]   [phone]           Bring a current list of meds and any records pertaining to this visit. For Physicals, please bring immunization records and any forms needing to be filled out. Please arrive 10 minutes early to complete paperwork.              Who to contact     If you have questions or need follow up information about today's clinic visit or your schedule please contact [facility] directly at [phone].  Normal or non-critical lab and imaging results will be communicated to you by [employer], letter or phone within 4 business days after the clinic has received the results. If you do not hear from us within 7 days, please contact the clinic through [employer] or phone. If you have a critical or abnormal lab result, we will notify you by phone as soon as possible.  Submit refill requests through [employer] or call your pharmacy and they will forward the refill request to us. Please allow 3 business days for your refill to be completed.          Additional Information About Your Visit        MyChart Information     [employer] gives you secure access to your electronic health record. If you see a primary care provider, you can also send messages to your care team and make appointments. If you have questions, please call your  "primary care clinic.  If you do not have a primary care provider, please call [phone] and they will assist you.        Care EveryWhere ID     This is your Care EveryWhere ID. This could be used by other organizations to access your [facility] medical records  [id number]        Your Vitals Were     Temperature Height Last Period Breastfeeding? BMI (Body Mass Index)       97.9  F (36.6  C) (Oral) 5' 6.5\" (1.689 m) 04/16/2018 (Exact Date) Unknown 26.61 kg/m2        Blood Pressure from Last 3 Encounters:   04/25/18 98/74   04/24/18 110/78   03/27/18 126/78    Weight from Last 3 Encounters:   04/25/18 167 lb 6.4 oz (75.9 kg)   04/24/18 168 lb 4 oz (76.3 kg)   03/27/18 167 lb 12.8 oz (76.1 kg)              Today, you had the following     No orders found for display       Primary Care Provider Office Phone # Fax #    Lisette Plata [phone]       [address] [facility] [address]        Equal Access to Services     [facility]: Hadii aad ku hadasho Soomaali, waaxda luqadaha, qaybta kaalmada adeegyada, waxay woodyin haygenan hue hickey lasusana ah. So [facility] [phone].    ATENCIÓN: Si habla español, tiene a bullock disposición servicios gratuitos de asistencia lingüística. Llame al [phone].    We comply with applicable federal civil rights laws and [place] laws. We do not discriminate on the basis of race, color, national origin, age, disability, sex, sexual orientation, or gender identity.            Thank you!     Thank you for choosing [facility]  for your care. Our goal is always to provide you with excellent care. Hearing back from our patients is one way we can continue to improve our services. Please take a few minutes to complete the written survey that you may receive in the mail after your visit with us. Thank you!             Your Updated Medication List - Protect others around you: Learn how to safely use, store and throw away your medicines at " www.disposemymeds.org.          This list is accurate as of 4/25/18 11:59 PM.  Always use your most recent med list.                   Brand Name Dispense Instructions for use Diagnosis    diclofenac 50 MG EC tablet    VOLTAREN    60 tablet    Take 1 tablet (50 mg) by mouth 3 times daily as needed for moderate pain    Dysmenorrhea       traMADol 50 MG tablet    ULTRAM    20 tablet    Take 1 tablet (50 mg) by mouth every 6 hours as needed for severe pain    Uterine leiomyoma, unspecified location, Dysmenorrhea

## 2018-04-25 NOTE — PROGRESS NOTES
"Chief Complaint   Patient presents with     Ultrasound     Fibroids       Initial BP 98/74 (BP Location: Right arm, Patient Position: Sitting, Cuff Size: Adult Regular)  Temp 97.9  F (36.6  C) (Oral)  Ht 5' 6.5\" (1.689 m)  Wt 167 lb 6.4 oz (75.9 kg)  LMP 2018 (Exact Date)  Breastfeeding? Unknown  BMI 26.61 kg/m2 Estimated body mass index is 26.61 kg/(m^2) as calculated from the following:    Height as of this encounter: 5' 6.5\" (1.689 m).    Weight as of this encounter: 167 lb 6.4 oz (75.9 kg).  BP completed using cuff size: regular        The following HM Due: NONE      The following patient reported/Care Every where data was sent to:  P ABSTRACT QUALITY INITIATIVES [00727]  n/a      n/a and patient has appointment for today            I was asked to see Shanell Alfaro by Dr Plata, for consultation regarding fibroids    HPI:  Shanell PERRY Dominic is a 32 year old  female who presents today to discuss management of her   Encounter Diagnoses   Name Primary?     Menorrhagia with regular cycle Yes     Dysmenorrhea      Uterine leiomyoma, unspecified location     A recent US gave her a new dx of fibroids.    She is here with her female partner.     She has a long standing h/o bad periods since young age,   Menarche at 11.  Started ocps at 15 for pain and bleeding and that did not help.     Currently cycles are monthly q 30 days --- bleeding for 7 days  3-4 days are bad days when she can't move, in tears.  almost fainted when she was out with friends recently  D/t the terrible pain.  Only helps is heating pad or bath.   Takes too much ibu  So tried voltarin and that did not help much.   Flow -- can go thru max tampons in less than one hr  X 3 days.     Patients records are available and reviewed at today's visit.   We reviewed her previous office visits, her US report and pictures.      Results for orders placed or performed in visit on 18   US Pelvic Complete w Transvaginal    " Narrative    PELVIC ULTRASOUND TRANSABDOMINAL IMAGING  AND TRANSVAGINAL IMAGING   4/18/2018 1:26 PM    HISTORY: Dysmenorrhea.    COMPARISON: None.    TECHNIQUE: Transabdominal imaging was performed. Transvaginal imaging  was also performed to better evaluate the myometrium.    FINDING: The uterus measures 9.3 x 4.5 x 5.1 cm. There are numerous  uterine myomas. The largest is in the posterior aspect of the uterine  fundus measuring 2.3 cm. No other myometrial abnormality is  demonstrated. The endometrium is normal measuring 1.1 cm. The right  ovary is normal. The left ovary is normal. Normal blood flow is seen  in both ovaries. No adnexal pathology is seen. There is no free fluid  in the cul-de-sac.      Impression    IMPRESSION: Multiple uterine myomas.    DEBI HARRIS MD        Past GYN history:  No STD history       Last PAP smear:    Lab Results   Component Value Date    PAP NIL 03/27/2018    PAP NIL 06/09/2015           Past Medical History:   Diagnosis Date     NO ACTIVE PROBLEMS        Past Surgical History:   Procedure Laterality Date     NOSE SURGERY  2005    deviated septum     WRIST SURGERY  2007    torn ligament       Family History   Problem Relation Age of Onset     Depression Mother      Anxiety Disorder Mother      Heart Failure Father      mi     DIABETES Father      Hypertension Father      Hyperlipidemia Father      Breast Cancer Maternal Grandmother 70     Heart Failure Maternal Grandfather      Other Cancer Paternal Grandmother      lung     Heart Failure Paternal Grandfather      Depression Sister      Anxiety Disorder Sister        Social History     Social History     Marital status:      Spouse name: N/A     Number of children: N/A     Years of education: N/A     Social History Main Topics     Smoking status: Current Every Day Smoker     Packs/day: 0.50     Types: Cigarettes     Smokeless tobacco: Never Used     Alcohol use 1.0 oz/week     2 Standard drinks or equivalent per  "week      Comment: rare     Drug use: No     Sexual activity: Yes     Partners: Female     Other Topics Concern     None     Social History Narrative       Allergies: Erythromycin    Current Outpatient Prescriptions   Medication Sig Dispense Refill     diclofenac (VOLTAREN) 50 MG EC tablet Take 1 tablet (50 mg) by mouth 3 times daily as needed for moderate pain (Patient not taking: Reported on 4/25/2018) 60 tablet 1     traMADol (ULTRAM) 50 MG tablet Take 1 tablet (50 mg) by mouth every 6 hours as needed for severe pain (Patient not taking: Reported on 4/25/2018) 20 tablet 0       ROS:  C: NEGATIVE for fever, chills, change in weight  I: NEGATIVE for worrisome rashes, moles or lesions  E: NEGATIVE for vision changes or irritation  E/M: NEGATIVE for ear, mouth and throat problems  R: NEGATIVE for significant cough or SOB  CV: NEGATIVE for chest pain, palpitations or peripheral edema  GI: NEGATIVE for nausea, abdominal pain, heartburn, or change in bowel habits  : see HPI for + bleeding and pain  M: NEGATIVE for significant arthralgias or myalgia  N: NEGATIVE for weakness, dizziness or paresthesias  E: NEGATIVE for temperature intolerance, skin/hair changes  P: NEGATIVE for changes in mood or affect      EXAM:  Blood pressure 98/74, temperature 97.9  F (36.6  C), temperature source Oral, height 5' 6.5\" (1.689 m), weight 167 lb 6.4 oz (75.9 kg), last menstrual period 04/16/2018, unknown if currently breastfeeding.  BMI= Body mass index is 26.61 kg/(m^2).  Patient's last menstrual period was 04/16/2018 (exact date).  General - pleasant female in no acute distress.  Neurological - alert and oriented X 3  Psychiatric - normal mood and affect  normal respiratory and cardiovascular effort         ASSESSMENT/PLAN:  Encounter Diagnoses   Name Primary?     Menorrhagia with regular cycle Yes     Dysmenorrhea      Uterine leiomyoma, unspecified location        we discussed above diagnoses at length today, including possibility " of endometriosis as a possible cause for her severe dysmenorrhea. Endometriosis, diagnosis and management options were reviewed.  We discussed dx of fibroids and pictures were used to help her understand their possible impact on her bleeding and fertility. We discussed that neither endometriosis or fibroids are a contraindication for pregnancy and that a pregnancy might actually improve the endometriosis but potentially cause the fibroids to grow.  Many women have been pregnant with fibroids.    Natural remedies for dysmenorrhea were discussed today including newer studies showed magnesium and calcium to improve symptoms along with alternative options like acupuncture.    If she chooses to have definitive treatment, at some point she may have a hysterectomy but at the moment, she is considering the possibility of a pregnancy.   For now patient will consider her options, choices and let me know if she has further questions.     A copy of the chart will be routed to the referring provider.    Lizette Hummel MD

## 2018-04-25 NOTE — Clinical Note
Justino Knox,  I saw your patient,  Here is a copy of my note. Thanks for the referral.  Lizette Hummel MD

## 2018-06-25 ENCOUNTER — OFFICE VISIT (OUTPATIENT)
Dept: FAMILY MEDICINE | Facility: CLINIC | Age: 33
End: 2018-06-25
Payer: COMMERCIAL

## 2018-06-25 VITALS
TEMPERATURE: 96.9 F | WEIGHT: 167 LBS | DIASTOLIC BLOOD PRESSURE: 78 MMHG | HEIGHT: 67 IN | BODY MASS INDEX: 26.21 KG/M2 | OXYGEN SATURATION: 100 % | HEART RATE: 84 BPM | RESPIRATION RATE: 10 BRPM | SYSTOLIC BLOOD PRESSURE: 124 MMHG

## 2018-06-25 DIAGNOSIS — S00.83XA CONTUSION OF FACE, INITIAL ENCOUNTER: Primary | ICD-10-CM

## 2018-06-25 PROBLEM — Z72.0 TOBACCO ABUSE: Status: ACTIVE | Noted: 2018-06-25

## 2018-06-25 PROCEDURE — 99213 OFFICE O/P EST LOW 20 MIN: CPT | Performed by: FAMILY MEDICINE

## 2018-06-25 ASSESSMENT — PATIENT HEALTH QUESTIONNAIRE - PHQ9: 5. POOR APPETITE OR OVEREATING: NOT AT ALL

## 2018-06-25 ASSESSMENT — ANXIETY QUESTIONNAIRES
GAD7 TOTAL SCORE: 5
2. NOT BEING ABLE TO STOP OR CONTROL WORRYING: MORE THAN HALF THE DAYS
5. BEING SO RESTLESS THAT IT IS HARD TO SIT STILL: NOT AT ALL
7. FEELING AFRAID AS IF SOMETHING AWFUL MIGHT HAPPEN: SEVERAL DAYS
6. BECOMING EASILY ANNOYED OR IRRITABLE: SEVERAL DAYS
IF YOU CHECKED OFF ANY PROBLEMS ON THIS QUESTIONNAIRE, HOW DIFFICULT HAVE THESE PROBLEMS MADE IT FOR YOU TO DO YOUR WORK, TAKE CARE OF THINGS AT HOME, OR GET ALONG WITH OTHER PEOPLE: NOT DIFFICULT AT ALL
3. WORRYING TOO MUCH ABOUT DIFFERENT THINGS: SEVERAL DAYS
1. FEELING NERVOUS, ANXIOUS, OR ON EDGE: NOT AT ALL

## 2018-06-25 NOTE — PROGRESS NOTES
"SUBJECTIVE:  Shanell Alfaro is a 32 year old female who presents with injury to face yesterday when she was struck in the head with a softball. Symptom onset has been sudden, unchanged for a time period of 1 days. Severity is described as moderate and localized. Course of her symptoms over time is unchanged.      OBJECTIVE:  EXAM:  /78  Pulse 84  Temp 96.9  F (36.1  C) (Oral)  Resp 10  Ht 5' 6.5\" (1.689 m)  Wt 167 lb (75.8 kg)  LMP 06/06/2018  SpO2 100%  Breastfeeding? No  BMI 26.55 kg/m2   Constitutional: alert, no distress and cooperative   Cardiovascular:  RRR. No murmurs, clicks gallops or rub  Respiratory:  Good diaphragmatic excursion. Lungs clear  Psychiatric: mentation appears normal and affect normal/bright  Head: swelling of left lower lip   SKIN: abrasion of left chin   JOINT/EXTREMITIES: extremities normal- no gross deformities noted and gait normal    ASSESSMENT/PLAN:  (S00.83XA) Contusion of face, initial encounter  (primary encounter diagnosis)  Plan: Symptomatic care.  Return to clinic for persistence, recurrence or new symptoms.       Kaylee Crow MD   "

## 2018-06-25 NOTE — PATIENT INSTRUCTIONS
[facility]    If you have any questions regarding to your visit please contact your care team:       Team Red:   Clinic Hours Telephone Number   Dr. Kaylee Hilliard, NP   7am-7pm  Monday - Thursday   7am-5pm  Fridays  [phone]  (Appointment scheduling available 24/7)    Questions about your recent visit?   Team Line  [phone]   Urgent Care - [place] and [facility] - 11am-9pm Monday-Friday Saturday-Sunday- 9am-5pm   [place] - 5pm-9pm Monday-Friday Saturday-Sunday- 9am-5pm  [phone] - [place]  [phone] - [place]       What options do I have for a visit other than an office visit? We offer electronic visits (e-visits) and telephone visits, when medically appropriate.  Please check with your medical insurance to see if these types of visits are covered, as you will be responsible for any charges that are not paid by your insurance.      You can use [employer] (secure electronic communication) to access to your chart, send your primary care provider a message, or make an appointment. Ask a team member how to get started.     For a price quote for your services, please call our Consumer Price Line at [phone] or our Imaging Cost estimation line at [phone] (for imaging tests).    Lisette ECHAVARRIA MA

## 2018-06-25 NOTE — MR AVS SNAPSHOT
After Visit Summary   6/25/2018    Shanell Alfaro    MRN: [id number]           Patient Information     Date Of Birth          1985        Visit Information        Provider Department      6/25/2018 8:20 AM Kaylee Crow MD [facility]        Today's Diagnoses     Contusion of face, initial encounter    -  1      Care Instructions    [facility]    If you have any questions regarding to your visit please contact your care team:       Team Red:   Clinic Hours Telephone Number   Dr. Kaylee Hilliard, NP   7am-7pm  Monday - Thursday   7am-5pm  Fridays  [phone]  (Appointment scheduling available 24/7)    Questions about your recent visit?   Team Line  [phone]   Urgent Care - [place] and [facility] - 11am-9pm Monday-Friday Saturday-Sunday- 9am-5pm   [place] - 5pm-9pm Monday-Friday Saturday-Sunday- 9am-5pm  [phone] - [place]  [phone] - [place]       What options do I have for a visit other than an office visit? We offer electronic visits (e-visits) and telephone visits, when medically appropriate.  Please check with your medical insurance to see if these types of visits are covered, as you will be responsible for any charges that are not paid by your insurance.      You can use [employer] (secure electronic communication) to access to your chart, send your primary care provider a message, or make an appointment. Ask a team member how to get started.     For a price quote for your services, please call our Consumer Price Line at [phone] or our Imaging Cost estimation line at [phone] (for imaging tests).    Lisette ECHAVARRIA MA            Follow-ups after your visit        Follow-up notes from your care team     Return if symptoms worsen or fail to improve.      Who to contact     If you have questions or need follow up information about today's clinic visit or your schedule  "please contact [facility] directly at [phone].  Normal or non-critical lab and imaging results will be communicated to you by MyChart, letter or phone within 4 business days after the clinic has received the results. If you do not hear from us within 7 days, please contact the clinic through [employer] or phone. If you have a critical or abnormal lab result, we will notify you by phone as soon as possible.  Submit refill requests through [employer] or call your pharmacy and they will forward the refill request to us. Please allow 3 business days for your refill to be completed.          Additional Information About Your Visit        [employer] Information     [employer] gives you secure access to your electronic health record. If you see a primary care provider, you can also send messages to your care team and make appointments. If you have questions, please call your primary care clinic.  If you do not have a primary care provider, please call [phone] and they will assist you.        Care EveryWhere ID     This is your Care EveryWhere ID. This could be used by other organizations to access your [facility] medical records  [id number]        Your Vitals Were     Pulse Temperature Respirations Height Last Period Pulse Oximetry    84 96.9  F (36.1  C) (Oral) 10 5' 6.5\" (1.689 m) 06/06/2018 100%    Breastfeeding? BMI (Body Mass Index)                No 26.55 kg/m2           Blood Pressure from Last 3 Encounters:   06/25/18 124/78   04/25/18 98/74   04/24/18 110/78    Weight from Last 3 Encounters:   06/25/18 167 lb (75.8 kg)   04/25/18 167 lb 6.4 oz (75.9 kg)   04/24/18 168 lb 4 oz (76.3 kg)              Today, you had the following     No orders found for display       Primary Care Provider Office Phone # Fax #    Lisette Plata [phone]       [address] [place] [address]        Equal Access to Services     ANNE-MARIE RODRIGUEZ AH: Hadii heidi sauceda hadasho Soomaali, waaxda luqadaha, qaybta " bird gustafsontray borja ah. So [facility] [phone].    ATENCIÓN: Si azam watson, tiene a bullock disposición servicios gratuitos de asistencia lingüística. Richard al [phone].    We comply with applicable federal civil rights laws and [place] laws. We do not discriminate on the basis of race, color, national origin, age, disability, sex, sexual orientation, or gender identity.            Thank you!     Thank you for choosing [facility]  for your care. Our goal is always to provide you with excellent care. Hearing back from our patients is one way we can continue to improve our services. Please take a few minutes to complete the written survey that you may receive in the mail after your visit with us. Thank you!             Your Updated Medication List - Protect others around you: Learn how to safely use, store and throw away your medicines at www.disposemymeds.org.          This list is accurate as of 6/25/18  8:47 AM.  Always use your most recent med list.                   Brand Name Dispense Instructions for use Diagnosis    diclofenac 50 MG EC tablet    VOLTAREN    60 tablet    Take 1 tablet (50 mg) by mouth 3 times daily as needed for moderate pain    Dysmenorrhea

## 2018-06-26 ASSESSMENT — PATIENT HEALTH QUESTIONNAIRE - PHQ9: SUM OF ALL RESPONSES TO PHQ QUESTIONS 1-9: 2

## 2018-06-26 ASSESSMENT — ANXIETY QUESTIONNAIRES: GAD7 TOTAL SCORE: 5

## 2018-07-07 ENCOUNTER — MYC MEDICAL ADVICE (OUTPATIENT)
Dept: FAMILY MEDICINE | Facility: CLINIC | Age: 33
End: 2018-07-07

## 2018-10-09 ENCOUNTER — OFFICE VISIT (OUTPATIENT)
Dept: FAMILY MEDICINE | Facility: CLINIC | Age: 33
End: 2018-10-09
Payer: COMMERCIAL

## 2018-10-09 VITALS
BODY MASS INDEX: 27.54 KG/M2 | RESPIRATION RATE: 16 BRPM | SYSTOLIC BLOOD PRESSURE: 131 MMHG | HEART RATE: 89 BPM | OXYGEN SATURATION: 97 % | WEIGHT: 173.2 LBS | TEMPERATURE: 98 F | DIASTOLIC BLOOD PRESSURE: 80 MMHG

## 2018-10-09 DIAGNOSIS — Z32.00 PREGNANCY EXAMINATION OR TEST, PREGNANCY UNCONFIRMED: ICD-10-CM

## 2018-10-09 DIAGNOSIS — D25.9 UTERINE LEIOMYOMA, UNSPECIFIED LOCATION: ICD-10-CM

## 2018-10-09 DIAGNOSIS — N80.9 ENDOMETRIOSIS: Primary | ICD-10-CM

## 2018-10-09 DIAGNOSIS — F43.23 ADJUSTMENT DISORDER WITH MIXED ANXIETY AND DEPRESSED MOOD: ICD-10-CM

## 2018-10-09 DIAGNOSIS — Z23 NEED FOR PROPHYLACTIC VACCINATION AND INOCULATION AGAINST INFLUENZA: ICD-10-CM

## 2018-10-09 LAB — BETA HCG QUAL IFA URINE: NEGATIVE

## 2018-10-09 PROCEDURE — 90471 IMMUNIZATION ADMIN: CPT | Performed by: NURSE PRACTITIONER

## 2018-10-09 PROCEDURE — 99214 OFFICE O/P EST MOD 30 MIN: CPT | Mod: 25 | Performed by: NURSE PRACTITIONER

## 2018-10-09 PROCEDURE — 96372 THER/PROPH/DIAG INJ SC/IM: CPT | Performed by: NURSE PRACTITIONER

## 2018-10-09 PROCEDURE — 90686 IIV4 VACC NO PRSV 0.5 ML IM: CPT | Performed by: NURSE PRACTITIONER

## 2018-10-09 PROCEDURE — 84703 CHORIONIC GONADOTROPIN ASSAY: CPT | Performed by: NURSE PRACTITIONER

## 2018-10-09 RX ORDER — MEDROXYPROGESTERONE ACETATE 150 MG/ML
150 INJECTION, SUSPENSION INTRAMUSCULAR
Qty: 1 ML | Refills: 3 | OUTPATIENT
Start: 2018-10-09 | End: 2019-02-20

## 2018-10-09 NOTE — NURSING NOTE
Prior to injection verified patient identity using patient's name and date of birth.  Due to injection administration, patient instructed to remain in clinic for 15 minutes  afterwards, and to report any adverse reaction to me immediately.

## 2018-10-09 NOTE — PROGRESS NOTES
SUBJECTIVE:   Shanell Alfaro is a 33 year old female who presents to clinic today for the following health issues:    Patient would like to start depo provera injection for endometriosis.  Heavy menses with significant cramping at times; where she needs to miss work.     FMLA paperwork    PROBLEMS TO ADD: Would like to find another therapist. Hx anxiety and depression, has treated it with prozac twice, worked well as teenager, did not work well as adult.  Will consider getting on a different medication if her symptoms worsen.     Problem list and histories reviewed & adjusted, as indicated.  Additional history: as documented    Patient Active Problem List   Diagnosis     CARDIOVASCULAR SCREENING; LDL GOAL LESS THAN 160     Tobacco abuse     Endometriosis     Uterine leiomyoma, unspecified location     Adjustment disorder with mixed anxiety and depressed mood     Past Surgical History:   Procedure Laterality Date     NOSE SURGERY  2005    deviated septum     WRIST SURGERY  2007    torn ligament       Social History   Substance Use Topics     Smoking status: Current Every Day Smoker     Packs/day: 0.50     Types: Cigarettes     Smokeless tobacco: Never Used     Alcohol use 1.0 oz/week     2 Standard drinks or equivalent per week      Comment: rare     Family History   Problem Relation Age of Onset     Depression Mother      Anxiety Disorder Mother      Heart Failure Father      mi     Diabetes Father      Hypertension Father      Hyperlipidemia Father      Breast Cancer Maternal Grandmother 70     Heart Failure Maternal Grandfather      Other Cancer Paternal Grandmother      lung     Heart Failure Paternal Grandfather      Depression Sister      Anxiety Disorder Sister          Current Outpatient Prescriptions   Medication Sig Dispense Refill     medroxyPROGESTERone (DEPO-PROVERA) 150 MG/ML injection Inject 1 mL (150 mg) into the muscle every 3 months 1 mL 3     Allergies   Allergen Reactions     Erythromycin  Nausea and Vomiting     Recent Labs   Lab Test  03/27/18   1421  06/09/15   1030   A1C  5.0   --    LDL  136*  77   HDL  57  52   TRIG  133  72   TSH  1.33   --       BP Readings from Last 3 Encounters:   10/09/18 131/80   06/25/18 124/78   04/25/18 98/74    Wt Readings from Last 3 Encounters:   10/09/18 173 lb 3.2 oz (78.6 kg)   06/25/18 167 lb (75.8 kg)   04/25/18 167 lb 6.4 oz (75.9 kg)                  Labs reviewed in EPIC    Reviewed and updated as needed this visit by clinical staff  Tobacco  Allergies  Meds  Problems  Med Hx  Surg Hx  Fam Hx  Soc Hx        Reviewed and updated as needed this visit by Provider  Allergies  Meds  Problems         ROS:  Constitutional, HEENT, cardiovascular, pulmonary, GI, , musculoskeletal, neuro, skin, endocrine and psych systems are negative, except as otherwise noted.    OBJECTIVE:     /80  Pulse 89  Temp 98  F (36.7  C) (Oral)  Resp 16  Wt 173 lb 3.2 oz (78.6 kg)  LMP 10/01/2018  SpO2 97%  Breastfeeding? No  BMI 27.54 kg/m2  Body mass index is 27.54 kg/(m^2).  GENERAL: healthy, alert and no distress  RESP: lungs clear to auscultation - no rales, rhonchi or wheezes  CV: regular rate and rhythm, normal S1 S2, no S3 or S4, no murmur, click or rub, no peripheral edema and peripheral pulses strong  ABDOMEN: soft, nontender, no hepatosplenomegaly, no masses and bowel sounds normal  PSYCH: mentation appears normal, affect normal/bright    Diagnostic Test Results:  See orders    ASSESSMENT/PLAN:         ICD-10-CM    1. Endometriosis N80.9 medroxyPROGESTERone (DEPO-PROVERA) 150 MG/ML injection     Medroxyprogesterone inj  1mg   (Depo Provera J-Code)     INJECTION INTRAMUSCULAR OR SUB-Q   2. Pregnancy examination or test, pregnancy unconfirmed Z32.00 Beta HCG qual IFA urine   3. Adjustment disorder with mixed anxiety and depressed mood F43.23 MENTAL HEALTH REFERRAL  - Adult; Outpatient Treatment; Individual/Couples/Family/Group Therapy/Health Psychology;  FMG: [facility] [phone]; We will contact you to schedule the appointment or please call with any questions   4. Uterine leiomyoma, unspecified location D25.9 medroxyPROGESTERone (DEPO-PROVERA) 150 MG/ML injection   5. Need for prophylactic vaccination and inoculation against influenza Z23 FLU VACCINE, SPLIT VIRUS, IM (QUADRIVALENT) [27055]- >3 YRS     Vaccine Administration, Initial [76546]       See Patient Instructions: I'm here if anything changes/ or if you have any health care questions or concerns that I can help you with.     Lisette Plata, P  [facility] TIMOTHY    Injectable Influenza Immunization Documentation    1.  Is the person to be vaccinated sick today?   No    2. Does the person to be vaccinated have an allergy to a component   of the vaccine?   No  Egg Allergy Algorithm Link    3. Has the person to be vaccinated ever had a serious reaction   to influenza vaccine in the past?   No    4. Has the person to be vaccinated ever had Guillain-Barré syndrome?   No    Form completed by Zaida Champion MA

## 2018-10-09 NOTE — NURSING NOTE
"Chief Complaint   Patient presents with     Forms     Imm/Inj     depo provera       Initial /80  Pulse 89  Temp 98  F (36.7  C) (Oral)  Resp 16  Wt 173 lb 3.2 oz (78.6 kg)  LMP 10/01/2018  SpO2 97%  Breastfeeding? No  BMI 27.54 kg/m2 Estimated body mass index is 27.54 kg/(m^2) as calculated from the following:    Height as of 6/25/18: 5' 6.5\" (1.689 m).    Weight as of this encounter: 173 lb 3.2 oz (78.6 kg).  Medication Reconciliation: complete     Zaida Champion MA  "

## 2018-10-09 NOTE — MR AVS SNAPSHOT
"              After Visit Summary   10/9/2018    Shanell Alfaro    MRN: [id number]           Patient Information     Date Of Birth          1985        Visit Information        Provider Department      10/9/2018 2:00 PM Lisette Plata NP [facility]        Today's Diagnoses     Pregnancy examination or test, pregnancy unconfirmed    -  1    Adjustment disorder with mixed anxiety and depressed mood        Endometriosis        Uterine leiomyoma, unspecified location          Care Instructions    I'm here if anything changes/ or if you have any health care questions or concerns that I can help you with.   Mental Health Crisis Numbers  [facility]  If you have a mental health or substance abuse crisis on a weekend or after hours, please use any of the resources below.  General numbers  911 emergency services  211 First Call for Help  [facility]  [address] [place] [address] [phone]  Crisis Connection Hotline  389.443.3350 or 1-857.876.9804  National Suicide Prevention Lifeline  5-202-210-TALK (1079)  NVC8AVLN  Text crisis line for teens. Text \"LIFE\" to 904251  [facility] mobile crisis services  These services will come to you. Call the county where the child is physically located.    [place] (adult only): [phone]    [place] (child and adult): [phone]    [place] (child and adult): [phone]    [place] (child): [phone]    [place] (adult): [phone]    [place] (child): [phone]    [place] (Adult): [phone]    [place] (child and adult): [phone]    [place] (child and adult): [phone] or [phone]  Other crisis resources (not mobile)  [facility]  [phone]  [place] Youth Crisis Hotline  980.321.9925 or 1-599.892.6300  Acute Psychiatric Services (formerly known as the Crisis " Intervention Center)  Offers walk-in and telephone crisis intervention services for adults.  [facility]  [address] [place] [address]  [phone] or 219-326-8597 (suicide hotline)  Short-term residential crisis resources  [facility] (ages 10 to 17)  [address] [place] [address] [phone]  Suggested inpatient hospitalization   [facility]  [address] [place] [address]  [phone]  For informational purposes only. Not to replace the advice of your health care provider.  Copyright   2014 [facility]. All rights reserved. Tasit.[employer] 914393 - REV 09/15.            Follow-ups after your visit        Additional Services     MENTAL HEALTH REFERRAL  - Adult; Outpatient Treatment; Individual/Couples/Family/Group Therapy/Health Psychology; FMG: [facility] [phone]; We will contact you to schedule the appointment or please call with any questions       All scheduling is subject to the client's specific insurance plan & benefits, provider/location availability, and provider clinical specialities.  Please arrive 15 minutes early for your first appointment and bring your completed paperwork.    Please be aware that coverage of these services is subject to the terms and limitations of your health insurance plan.  Call member services at your health plan with any benefit or coverage questions.                            Follow-up notes from your care team     Return if symptoms worsen or fail to improve.      Who to contact     Normal or non-critical lab and imaging results will be communicated to you by MyChart, letter or phone within 4 business days after the clinic has received the results. If you do not hear from us within 7 days, please contact the clinic through MyChart or phone. If you have a critical or abnormal lab result, we will notify you by phone as soon as  possible.  Submit refill requests through [employer] or call your pharmacy and they will forward the refill request to us. Please allow 3 business days for your refill to be completed.          If you need to speak with a  for additional information , please call: [phone]             Additional Information About Your Visit        [employer] Information     [employer] gives you secure access to your electronic health record. If you see a primary care provider, you can also send messages to your care team and make appointments. If you have questions, please call your primary care clinic.  If you do not have a primary care provider, please call [phone] and they will assist you.        Care EveryWhere ID     This is your Care EveryWhere ID. This could be used by other organizations to access your [place] medical records  [id number]        Your Vitals Were     Pulse Temperature Respirations Last Period Pulse Oximetry Breastfeeding?    89 98  F (36.7  C) (Oral) 16 10/01/2018 97% No    BMI (Body Mass Index)                   27.54 kg/m2            Blood Pressure from Last 3 Encounters:   10/09/18 131/80   06/25/18 124/78   04/25/18 98/74    Weight from Last 3 Encounters:   10/09/18 173 lb 3.2 oz (78.6 kg)   06/25/18 167 lb (75.8 kg)   04/25/18 167 lb 6.4 oz (75.9 kg)              We Performed the Following     Beta HCG qual IFA urine     MENTAL HEALTH REFERRAL  - Adult; Outpatient Treatment; Individual/Couples/Family/Group Therapy/Health Psychology; G: [facility] [phone]; We will contact you to schedule the appointment or please call with any questions          Today's Medication Changes          These changes are accurate as of 10/9/18  2:24 PM.  If you have any questions, ask your nurse or doctor.               Start taking these medicines.        Dose/Directions    medroxyPROGESTERone 150 MG/ML injection   Commonly known as:  DEPO-PROVERA   Used for:  Endometriosis, Uterine  leiomyoma, unspecified location   Started by:  Lisette Plata NP        Dose:  150 mg   Inject 1 mL (150 mg) into the muscle every 3 months   Quantity:  1 mL   Refills:  3            Where to get your medicines      Some of these will need a paper prescription and others can be bought over the counter.  Ask your nurse if you have questions.     You don't need a prescription for these medications     medroxyPROGESTERone 150 MG/ML injection                Primary Care Provider Office Phone # Fax #    Lisette Plata [phone] [address] [facility] [address]        Equal Access to Services     : Hadii aad ku hadasho Soomaali, waaxda luqadaha, qaybta kaalmada adeegyada, bird borja . So [facility] [phone].    ATENCIÓN: Si habla español, tiene a bullock disposición servicios gratuitos de asistencia lingüística. Ll[facility]tering [facility] [phone].    We comply with applicable federal civil rights laws and [place] laws. We do not discriminate on the basis of race, color, national origin, age, disability, sex, sexual orientation, or gender identity.            Thank you!     Thank you for choosing [facility]  for your care. Our goal is always to provide you with excellent care. Hearing back from our patients is one way we can continue to improve our services. Please take a few minutes to complete the written survey that you may receive in the mail after your visit with us. Thank you!             Your Updated Medication List - Protect others around you: Learn how to safely use, store and throw away your medicines at www.disposemymeds.org.          This list is accurate as of 10/9/18  2:24 PM.  Always use your most recent med list.                   Brand Name Dispense Instructions for use Diagnosis    medroxyPROGESTERone 150 MG/ML injection    DEPO-PROVERA    1 mL    Inject 1 mL (150 mg) into the muscle every 3 months    Endometriosis, Uterine  leiomyoma, unspecified location

## 2018-10-09 NOTE — PATIENT INSTRUCTIONS
"I'm here if anything changes/ or if you have any health care questions or concerns that I can help you with.   Mental Health Crisis Numbers  [facility]  If you have a mental health or substance abuse crisis on a weekend or after hours, please use any of the resources below.  General numbers  911 emergency services  211 First Call for Help  [facility]  [address] [place] [id number] [phone]  Crisis Connection Hotline  145.690.2778 or 1-820.273.5015  National Suicide Prevention Lifeline  0-989-083-TALK (2349)  OQZ4SDBC  Text crisis line for teens. Text \"LIFE\" to 956946  [facility] mobile crisis services  These services will come to you. Call the county where the child is physically located.    [place] (adult only): [phone]    [place] (child and adult): [phone]    [place] (child and adult): [phone]    [place] (child): [phone]    [place] (adult): [phone]    [place] (child): [phone]    [place] (Adult): [phone]    [place] (child and adult): [phone]    [place] (child and adult): [phone] or [phone]  Other crisis resources (not mobile)  [facility]  1-287-739-7578  [place] Youth Crisis Hotline  940.803.4555 or 1-585.733.6341  Acute Psychiatric Services (formerly known as the Crisis Intervention Center)  Offers walk-in and telephone crisis intervention services for adults.  [facility]  [address] [place] [address] [phone] or [phone] (suicide hotline)  Short-term residential crisis resources  [facility] (ages 10 to 17)  [address] [place] [address] [phone]  Suggested inpatient hospitalization   [address] [address]  [phone]  For informational " purposes only. Not to replace the advice of your health care provider.  Copyright   2014 [facility]. All rights reserved. [employer] 699534 - REV 09/15.

## 2018-11-19 ENCOUNTER — MYC MEDICAL ADVICE (OUTPATIENT)
Dept: FAMILY MEDICINE | Facility: CLINIC | Age: 33
End: 2018-11-19

## 2018-12-17 ENCOUNTER — MYC MEDICAL ADVICE (OUTPATIENT)
Dept: FAMILY MEDICINE | Facility: CLINIC | Age: 33
End: 2018-12-17

## 2019-01-22 ENCOUNTER — OFFICE VISIT (OUTPATIENT)
Dept: OBGYN | Facility: CLINIC | Age: 34
End: 2019-01-22
Payer: COMMERCIAL

## 2019-01-22 VITALS
DIASTOLIC BLOOD PRESSURE: 84 MMHG | HEART RATE: 65 BPM | OXYGEN SATURATION: 99 % | WEIGHT: 174.6 LBS | SYSTOLIC BLOOD PRESSURE: 121 MMHG | TEMPERATURE: 98.2 F | HEIGHT: 67 IN | BODY MASS INDEX: 27.4 KG/M2

## 2019-01-22 DIAGNOSIS — N80.9 ENDOMETRIOSIS: ICD-10-CM

## 2019-01-22 DIAGNOSIS — E55.9 VITAMIN D DEFICIENCY: ICD-10-CM

## 2019-01-22 DIAGNOSIS — N93.8 DUB (DYSFUNCTIONAL UTERINE BLEEDING): Primary | ICD-10-CM

## 2019-01-22 DIAGNOSIS — D25.9 UTERINE LEIOMYOMA, UNSPECIFIED LOCATION: ICD-10-CM

## 2019-01-22 DIAGNOSIS — N94.6 DYSMENORRHEA: ICD-10-CM

## 2019-01-22 LAB — HGB BLD-MCNC: 13.9 G/DL (ref 11.7–15.7)

## 2019-01-22 PROCEDURE — 83540 ASSAY OF IRON: CPT | Performed by: OBSTETRICS & GYNECOLOGY

## 2019-01-22 PROCEDURE — 36415 COLL VENOUS BLD VENIPUNCTURE: CPT | Performed by: OBSTETRICS & GYNECOLOGY

## 2019-01-22 PROCEDURE — 99214 OFFICE O/P EST MOD 30 MIN: CPT | Performed by: OBSTETRICS & GYNECOLOGY

## 2019-01-22 PROCEDURE — 84443 ASSAY THYROID STIM HORMONE: CPT | Performed by: OBSTETRICS & GYNECOLOGY

## 2019-01-22 PROCEDURE — 82728 ASSAY OF FERRITIN: CPT | Performed by: OBSTETRICS & GYNECOLOGY

## 2019-01-22 PROCEDURE — 85018 HEMOGLOBIN: CPT | Performed by: OBSTETRICS & GYNECOLOGY

## 2019-01-22 PROCEDURE — 82306 VITAMIN D 25 HYDROXY: CPT | Performed by: OBSTETRICS & GYNECOLOGY

## 2019-01-22 PROCEDURE — 83550 IRON BINDING TEST: CPT | Performed by: OBSTETRICS & GYNECOLOGY

## 2019-01-22 RX ORDER — ESTRADIOL 0.1 MG/D
1 FILM, EXTENDED RELEASE TRANSDERMAL
Qty: 24 PATCH | Refills: 3 | Status: SHIPPED | OUTPATIENT
Start: 2019-01-24 | End: 2019-04-04

## 2019-01-22 ASSESSMENT — MIFFLIN-ST. JEOR: SCORE: 1516.67

## 2019-01-22 NOTE — PROGRESS NOTES
"Chief Complaint   Patient presents with     Consult       Initial /84 (BP Location: Left arm, Patient Position: Sitting, Cuff Size: Adult Regular)   Pulse 65   Temp 98.2  F (36.8  C) (Oral)   Ht 1.689 m (5' 6.5\")   Wt 79.2 kg (174 lb 9.6 oz)   SpO2 99%   Breastfeeding? No   BMI 27.76 kg/m   Estimated body mass index is 27.76 kg/m  as calculated from the following:    Height as of this encounter: 1.689 m (5' 6.5\").    Weight as of this encounter: 79.2 kg (174 lb 9.6 oz).  BP completed using cuff size: regular    Questioned patient about current smoking habits.  Pt. currently smokes.  Advised about smoking cessation.          The following HM Due: NONE      The following patient reported/Care Every where data was sent to:  P ABSTRACT QUALITY INITIATIVES [52560]  n/a      n/a and patient has appointment for today              HPI:  Shanell Alfaro is a 33 year old female here for a consultation regarding: would like to discuss a hysterectomy.  She is here with her partner.     Patient had a new dx of multiple uterine fibroids with US last yr.   Has a long h/o heavy, painful bleeding  Avoided hyst in the past d/t consideration for childbearing.   Here today day with partner. Wants to get a hysterectomy scheduled.     No LMP recorded. Patient has had an injection. depo   No current plans to have a child.  Thought about that long and hard and that will not be an option.   She is emotionally over this issue.  Partner might carry or they might adopt.   Patient tried depo provera in 2018 to control her bleeding after discussion with primary doctor.  She hated it.   Felt emotional, bleeding daily, severe cramping, weak, dizzy, feet swelling   She did not get the next shot.  Her cycles are still messed up from it.  Still bleeding daily now. Waxes and wanes, heavy and light, very painful for the past couple weeks  Has been taking iron supplements for about a month.    Feeling better since starting " "iron.     Partner here today states that she has a terrible quality of life, misses so much work. \"lays in bed in the fetal position for days on end, d/t the pain\"  Patient states she is ready to do hyst now because her quality of life is so bad.            Past GYN history:   Lab Results   Component Value Date    PAP NIL 03/27/2018    PAP NIL 06/09/2015           Past Medical History:   Diagnosis Date     Anxiety disorder 6/9/2015     Major depressive disorder, recurrent episode, mild (H) 6/9/2015       Past Surgical History:   Procedure Laterality Date     NOSE SURGERY  2005    deviated septum     WRIST SURGERY  2007    torn ligament       Family History   Problem Relation Age of Onset     Depression Mother      Anxiety Disorder Mother      Heart Failure Father         mi     Diabetes Father      Hypertension Father      Hyperlipidemia Father      Breast Cancer Maternal Grandmother 70     Heart Failure Maternal Grandfather      Other Cancer Paternal Grandmother         lung     Heart Failure Paternal Grandfather      Depression Sister      Anxiety Disorder Sister          Medications:    Current Outpatient Medications:      medroxyPROGESTERone (DEPO-PROVERA) 150 MG/ML injection, Inject 1 mL (150 mg) into the muscle every 3 months (Patient not taking: Reported on 1/22/2019), Disp: 1 mL, Rfl: 3    Allergies:  Erythromycin    ROS:  C: NEGATIVE for fever, chills, change in weight  I: NEGATIVE for worrisome rashes, moles or lesions  E: NEGATIVE for vision changes or irritation  E/M: NEGATIVE for ear, mouth and throat problems  R: NEGATIVE for significant cough or SOB  CV: NEGATIVE for chest pain, palpitations or peripheral edema  GI: NEGATIVE for nausea, abdominal pain, heartburn, or change in bowel habits  : + Irregular bleeding, painful periods, heavy bleeding  M: positive for myalgias  N: NEGATIVE for weakness, dizziness or paresthesias  E: NEGATIVE for temperature intolerance, skin/hair changes  P: positive for " "changes in mood or affect after depo provera      EXAM:  Blood pressure 121/84, pulse 65, temperature 98.2  F (36.8  C), temperature source Oral, height 1.689 m (5' 6.5\"), weight 79.2 kg (174 lb 9.6 oz), SpO2 99 %, not currently breastfeeding.  BMI= Body mass index is 27.76 kg/m .  No LMP recorded. Patient has had an injection.  General - pleasant female in no acute distress.  Neurological - alert and oriented X 3  Psychiatric - normal mood and affect    No other physical examination was performed today as we spent over 50% of today's 30 minute visit in face-to-face discussion and counseling about issues in her ASSESSMENT and plan.    ASSESSMENT:  Encounter Diagnoses   Name Primary?     DUB (dysfunctional uterine bleeding) Yes     Uterine leiomyoma, unspecified location      Dysmenorrhea      Endometriosis suspected         PLAN:   (N93.8) DUB (dysfunctional uterine bleeding)  (primary encounter diagnosis)  Comment: Long-standing problem, now worse after Depo-Provera    (D25.9) Uterine leiomyoma, unspecified location  Comment:  multiple    (N94.6) Dysmenorrhea  Comment: Uncontrolled with NSAIDs       (N80.9) Endometriosis  Comment: Suspected a possibility based on symptoms     25 minutes spent with patient, > 50% in direct face-to-face counseling for management and coordination of care for the above symptoms.     We discussed that a hysterectomy would make her sterile.  This is an irreversible sterilization procedure.  She voices understanding of this and feels comfortable at this point moving forward with a hysterectomy.  Less invasive options would include oral contraceptives, IUD, endometrial ablation.   She does not tolerate hormones and is not interested in any of these options.  We discussed need for endometrial biopsy prior to proceeding with a hyst.  She would be a good candidate for laparoscopic hysterectomy.  The risks and benefits of TLH was discussed in detail.   We will leave the ovaries in place and " take the tubes.   Postop recovery also discussed.    Patient will come back for a EMB and pelvic exam.       Daya Queen MD

## 2019-01-23 LAB — DEPRECATED CALCIDIOL+CALCIFEROL SERPL-MC: 11 UG/L (ref 20–75)

## 2019-01-24 LAB
FERRITIN SERPL-MCNC: 21 NG/ML (ref 12–150)
IRON SATN MFR SERPL: 27 % (ref 15–46)
IRON SERPL-MCNC: 96 UG/DL (ref 35–180)
TIBC SERPL-MCNC: 351 UG/DL (ref 240–430)
TSH SERPL DL<=0.005 MIU/L-ACNC: 1.24 MU/L (ref 0.4–4)

## 2019-02-19 ENCOUNTER — TELEPHONE (OUTPATIENT)
Dept: OBGYN | Facility: CLINIC | Age: 34
End: 2019-02-19

## 2019-02-20 ENCOUNTER — OFFICE VISIT (OUTPATIENT)
Dept: OBGYN | Facility: CLINIC | Age: 34
End: 2019-02-20
Payer: COMMERCIAL

## 2019-02-20 VITALS
WEIGHT: 172.1 LBS | SYSTOLIC BLOOD PRESSURE: 121 MMHG | BODY MASS INDEX: 27.01 KG/M2 | OXYGEN SATURATION: 97 % | HEIGHT: 67 IN | TEMPERATURE: 98.1 F | HEART RATE: 80 BPM | DIASTOLIC BLOOD PRESSURE: 76 MMHG

## 2019-02-20 DIAGNOSIS — Z76.89 ENCOUNTER FOR BIOPSY: ICD-10-CM

## 2019-02-20 DIAGNOSIS — N94.6 DYSMENORRHEA: ICD-10-CM

## 2019-02-20 DIAGNOSIS — Z72.0 TOBACCO ABUSE: ICD-10-CM

## 2019-02-20 DIAGNOSIS — N93.8 DUB (DYSFUNCTIONAL UTERINE BLEEDING): Primary | ICD-10-CM

## 2019-02-20 DIAGNOSIS — D25.9 UTERINE LEIOMYOMA, UNSPECIFIED LOCATION: ICD-10-CM

## 2019-02-20 LAB — HCG UR QL: NEGATIVE

## 2019-02-20 PROCEDURE — 99212 OFFICE O/P EST SF 10 MIN: CPT | Mod: 25 | Performed by: OBSTETRICS & GYNECOLOGY

## 2019-02-20 PROCEDURE — 81025 URINE PREGNANCY TEST: CPT | Performed by: OBSTETRICS & GYNECOLOGY

## 2019-02-20 PROCEDURE — 88305 TISSUE EXAM BY PATHOLOGIST: CPT | Performed by: OBSTETRICS & GYNECOLOGY

## 2019-02-20 PROCEDURE — 58100 BIOPSY OF UTERUS LINING: CPT | Performed by: OBSTETRICS & GYNECOLOGY

## 2019-02-20 ASSESSMENT — MIFFLIN-ST. JEOR: SCORE: 1505.33

## 2019-02-20 NOTE — PROGRESS NOTES
"Chief Complaint   Patient presents with     Gyn Exam     Endometrial biopsy       Initial /76 (BP Location: Left arm, Patient Position: Sitting, Cuff Size: Adult Large)   Pulse 80   Temp 98.1  F (36.7  C) (Oral)   Ht 1.689 m (5' 6.5\")   Wt 78.1 kg (172 lb 1.6 oz)   LMP 2019   SpO2 97%   Breastfeeding? No   BMI 27.36 kg/m   Estimated body mass index is 27.36 kg/m  as calculated from the following:    Height as of this encounter: 1.689 m (5' 6.5\").    Weight as of this encounter: 78.1 kg (172 lb 1.6 oz).  BP completed using cuff size: large    Questioned patient about current smoking habits.  Pt. currently smokes.  Advised about smoking cessation.          The following HM Due: NONE      The following patient reported/Care Every where data was sent to:  P ABSTRACT QUALITY INITIATIVES [[id number]]  n/a      n/a and patient has appointment for today       Shanell PERRY Dominic is here for an EMB and discuss her upcoming hysterectomy.  she has known fibroids, DUB, dysmenorrhea  that we have been worsening over time.   At this point in time she has requested to have surgery as the symptoms are getting more severe and affecting her quality of life.  Previously we discussed performing a laparoscopic hysterectomy and leaving the ovaries.    Past Medical History:   Diagnosis Date     Anxiety disorder 2015     Major depressive disorder, recurrent episode, mild (H) 2015      Past Surgical History:   Procedure Laterality Date     NOSE SURGERY      deviated septum     WRIST SURGERY      torn ligament     Current Outpatient Medications   Medication Sig Dispense Refill     estradiol (VIVELLE-DOT) 0.1 MG/24HR bi-weekly patch Place 1 patch onto the skin twice a week 24 patch 3     vitamin D3 (CHOLECALCIFEROL) 26464 units capsule Take 1 capsule (50,000 Units) by mouth once a week 8 capsule 0        Allergies   Allergen Reactions     Erythromycin Nausea and Vomiting       OBJECTIVE:  /76 (BP " "Location: Left arm, Patient Position: Sitting, Cuff Size: Adult Large)   Pulse 80   Temp 98.1  F (36.7  C) (Oral)   Ht 1.689 m (5' 6.5\")   Wt 78.1 kg (172 lb 1.6 oz)   LMP 02/11/2019   SpO2 97%   Breastfeeding? No   BMI 27.36 kg/m       Pelvic:  EG - normal adult female.  BUS - within normal limits.  Vagina - well rugated, no discharge.  Cervix - no lesions, no CMT.  Uterus - firm, 6-7 wk size and nontender.  Adnexae - no masses or tenderness.  RV - deferred.    PROCEDURE:    After obtaining consent from the patient, an endometrial biopsy was performed.  A bimanual exam was first done.  The cervix was prepped with betadine and a tenaculum placed on the cervix.  The cervical os was dilated with a small dilator and the uterus sounded to 7.5 cm.  The pipelle was inserted without difficulty.  A moderate amount of tissue was obtained and sent to pathology.  She tolerated the procedure very well.  No complications.        ASSESSMENT:  Encounter Diagnoses   Name Primary?     DUB (dysfunctional uterine bleeding) Yes     Encounter for biopsy      Uterine leiomyoma, unspecified location      Tobacco abuse      Dysmenorrhea         PLAN:  EMB done and sent to pathology.   10 min spent discussing her procedure and counseling her on management of her symptoms.   Discussed total laparoscopic hysterectomy with normal expectations for recovery.  Recommend smoking cessation now to reduce the risk of unnecessary complications both intraoperative and postoperatively.   Since her irregular bleeding from Depo-Provera stopped with the estrogen patches, the patches can be discontinued.  Her questions in regards to her upcoming surgery were answered to her apparent satisfaction.    Daya Queen MD     "

## 2019-02-20 NOTE — TELEPHONE ENCOUNTER
Type of surgery: gyn  Location of surgery: [facility]  Date and time of surgery: 4/10/19 730a  Surgeon: Bernice  Pre-Op Appt Date: tbd  Post-Op Appt Date: 4 weeks   Packet sent out: Yes  Pre-cert/Authorization completed:  No  Date: 2/20/2019    Diana BOWIE, Surgery Coordinator

## 2019-02-21 ENCOUNTER — TELEPHONE (OUTPATIENT)
Dept: OBGYN | Facility: CLINIC | Age: 34
End: 2019-02-21

## 2019-02-21 NOTE — TELEPHONE ENCOUNTER
Called patient and left message asking to call clinic back. Need fax number for forms.   Deonna Hickman,

## 2019-02-21 NOTE — TELEPHONE ENCOUNTER
Received forms and filled out. Already signed by provider. Note on forms indicates to fax once completed, but no fax number provided. Will fax once number is known.   Deonna Hickman,

## 2019-02-25 LAB — COPATH REPORT: NORMAL

## 2019-04-03 DIAGNOSIS — Z01.818 PRE-OP EXAM: Primary | ICD-10-CM

## 2019-04-03 DIAGNOSIS — D25.9 FIBROID UTERUS: ICD-10-CM

## 2019-04-04 ENCOUNTER — OFFICE VISIT (OUTPATIENT)
Dept: FAMILY MEDICINE | Facility: CLINIC | Age: 34
End: 2019-04-04
Payer: COMMERCIAL

## 2019-04-04 VITALS
SYSTOLIC BLOOD PRESSURE: 115 MMHG | HEART RATE: 70 BPM | DIASTOLIC BLOOD PRESSURE: 78 MMHG | TEMPERATURE: 97.9 F | WEIGHT: 167 LBS | RESPIRATION RATE: 16 BRPM | BODY MASS INDEX: 26.21 KG/M2 | HEIGHT: 67 IN | OXYGEN SATURATION: 100 %

## 2019-04-04 DIAGNOSIS — F17.200 TOBACCO USE DISORDER: ICD-10-CM

## 2019-04-04 DIAGNOSIS — Z01.818 PREOP GENERAL PHYSICAL EXAM: Primary | ICD-10-CM

## 2019-04-04 DIAGNOSIS — Z11.4 SCREENING FOR HIV (HUMAN IMMUNODEFICIENCY VIRUS): ICD-10-CM

## 2019-04-04 DIAGNOSIS — N80.9 ENDOMETRIOSIS: ICD-10-CM

## 2019-04-04 DIAGNOSIS — D25.9 UTERINE LEIOMYOMA, UNSPECIFIED LOCATION: ICD-10-CM

## 2019-04-04 LAB
BASOPHILS # BLD AUTO: 0 10E9/L (ref 0–0.2)
BASOPHILS NFR BLD AUTO: 0.2 %
DIFFERENTIAL METHOD BLD: NORMAL
EOSINOPHIL # BLD AUTO: 0.2 10E9/L (ref 0–0.7)
EOSINOPHIL NFR BLD AUTO: 3.2 %
ERYTHROCYTE [DISTWIDTH] IN BLOOD BY AUTOMATED COUNT: 12.6 % (ref 10–15)
HCG SERPL QL: NEGATIVE
HCT VFR BLD AUTO: 41 % (ref 35–47)
HGB BLD-MCNC: 13.6 G/DL (ref 11.7–15.7)
HIV 1+2 AB+HIV1 P24 AG SERPL QL IA: NONREACTIVE
LYMPHOCYTES # BLD AUTO: 1.3 10E9/L (ref 0.8–5.3)
LYMPHOCYTES NFR BLD AUTO: 22.1 %
MCH RBC QN AUTO: 30.4 PG (ref 26.5–33)
MCHC RBC AUTO-ENTMCNC: 33.2 G/DL (ref 31.5–36.5)
MCV RBC AUTO: 92 FL (ref 78–100)
MONOCYTES # BLD AUTO: 0.6 10E9/L (ref 0–1.3)
MONOCYTES NFR BLD AUTO: 10 %
NEUTROPHILS # BLD AUTO: 3.9 10E9/L (ref 1.6–8.3)
NEUTROPHILS NFR BLD AUTO: 64.5 %
PLATELET # BLD AUTO: 226 10E9/L (ref 150–450)
RBC # BLD AUTO: 4.48 10E12/L (ref 3.8–5.2)
WBC # BLD AUTO: 6 10E9/L (ref 4–11)

## 2019-04-04 PROCEDURE — 87389 HIV-1 AG W/HIV-1&-2 AB AG IA: CPT | Performed by: NURSE PRACTITIONER

## 2019-04-04 PROCEDURE — 84703 CHORIONIC GONADOTROPIN ASSAY: CPT | Performed by: NURSE PRACTITIONER

## 2019-04-04 PROCEDURE — 99214 OFFICE O/P EST MOD 30 MIN: CPT | Performed by: NURSE PRACTITIONER

## 2019-04-04 PROCEDURE — 36415 COLL VENOUS BLD VENIPUNCTURE: CPT | Performed by: NURSE PRACTITIONER

## 2019-04-04 PROCEDURE — 85025 COMPLETE CBC W/AUTO DIFF WBC: CPT | Performed by: NURSE PRACTITIONER

## 2019-04-04 ASSESSMENT — ANXIETY QUESTIONNAIRES
7. FEELING AFRAID AS IF SOMETHING AWFUL MIGHT HAPPEN: SEVERAL DAYS
1. FEELING NERVOUS, ANXIOUS, OR ON EDGE: SEVERAL DAYS
GAD7 TOTAL SCORE: 4
2. NOT BEING ABLE TO STOP OR CONTROL WORRYING: NOT AT ALL
6. BECOMING EASILY ANNOYED OR IRRITABLE: SEVERAL DAYS
3. WORRYING TOO MUCH ABOUT DIFFERENT THINGS: SEVERAL DAYS
5. BEING SO RESTLESS THAT IT IS HARD TO SIT STILL: NOT AT ALL

## 2019-04-04 ASSESSMENT — PATIENT HEALTH QUESTIONNAIRE - PHQ9
5. POOR APPETITE OR OVEREATING: NOT AT ALL
SUM OF ALL RESPONSES TO PHQ QUESTIONS 1-9: 1

## 2019-04-04 ASSESSMENT — MIFFLIN-ST. JEOR: SCORE: 1494.1

## 2019-04-04 NOTE — PROGRESS NOTES
[facility] [place]  [address]  [place] [address]  [phone]  Dept: [phone]    PRE-OP EVALUATION:  Today's date: 2019    Shanell Alfaro (: 1985) presents for pre-operative evaluation assessment as requested by Dr. Queen.  She requires evaluation and anesthesia risk assessment prior to undergoing surgery/procedure for treatment of endometriosis  .    Proposed Surgery/ Procedure: TOTAL LAPAROSCOPIC HYSTERECTOMY WITH POSSIBLE SINGLE PORT  Date of Surgery/ Procedure: 4/10/19  Time of Surgery/ Procedure: [address] [place] [address]/Surgical Facility: [facility]   Primary Physician: Lisette Plata  Type of Anesthesia Anticipated: General    Patient has a Health Care Directive or Living Will:  NO    1. NO - Do you have a history of heart attack, stroke, stent, bypass or surgery on an artery in the head, neck, heart or legs?  2. NO - Do you ever have any pain or discomfort in your chest?  3. NO - Do you have a history of  Heart Failure?  4. NO - Are you troubled by shortness of breath when: walking on the level, up a slight hill or at night?  5. YES - DO YOU CURRENTLY HAVE A COLD, BRONCHITIS OR OTHER RESPIRATORY INFECTION? Mild cold symotoms  6. NO - Do you have a cough, shortness of breath or wheezing?  7. NO - Do you sometimes get pains in the calves of your legs when you walk?  8. NO - Do you or anyone in your family have previous history of blood clots?  9. NO - Do you or does anyone in your family have a serious bleeding problem such as prolonged bleeding following surgeries or cuts?  10. NO - Have you ever had problems with anemia or been told to take iron pills?  11. NO - Have you had any abnormal blood loss such as black, tarry or bloody stools, or abnormal vaginal bleeding?  12. NO - Have you ever had a blood transfusion?  13. NO - Have you or any of your relatives ever had problems with anesthesia?  14. NO - Do you have sleep apnea, excessive snoring or  daytime drowsiness?  15. NO - Do you have any prosthetic heart valves?  16. NO - Do you have prosthetic joints?  17. NO - Is there any chance that you may be pregnant?      HPI:     HPI related to upcoming procedure: Endometriosis/uterine leiomyoma. Needs hysterectomy. Depo shot was not effective.                                                                                                                         MEDICAL HISTORY:     Patient Active Problem List    Diagnosis Date Noted     Endometriosis 10/09/2018     Priority: Medium     Uterine leiomyoma, unspecified location 10/09/2018     Priority: Medium     Adjustment disorder with mixed anxiety and depressed mood 10/09/2018     Priority: Medium     Tobacco use disorder 06/25/2018     Priority: Medium     CARDIOVASCULAR SCREENING; LDL GOAL LESS THAN 160 06/09/2015     Priority: Medium      Past Medical History:   Diagnosis Date     Anxiety disorder 6/9/2015     Depressive disorder      Major depressive disorder, recurrent episode, mild (H) 6/9/2015     Past Surgical History:   Procedure Laterality Date     ENT SURGERY       NOSE SURGERY  2005    deviated septum     WRIST SURGERY  2007    torn ligament     No current outpatient medications on file.     OTC products: ibu    Allergies   Allergen Reactions     Erythromycin Nausea and Vomiting      Latex Allergy: NO    Social History     Tobacco Use     Smoking status: Current Every Day Smoker     Packs/day: 0.50     Years: 5.00     Pack years: 2.50     Types: Cigarettes     Smokeless tobacco: Never Used   Substance Use Topics     Alcohol use: Yes     Alcohol/week: 1.0 oz     Comment: rare     History   Drug Use No       REVIEW OF SYSTEMS:   CONSTITUTIONAL: NEGATIVE for fever, chills, change in weight  INTEGUMENTARY/SKIN: NEGATIVE for worrisome rashes, moles or lesions  EYES: NEGATIVE for vision changes or irritation  ENT/MOUTH: NEGATIVE for ear, mouth and throat problems  RESP: NEGATIVE for significant cough or  "SOB  CV: NEGATIVE for chest pain, palpitations or peripheral edema  GI: NEGATIVE for nausea, abdominal pain, heartburn, or change in bowel habits  : NEGATIVE for frequency, dysuria, or hematuria  MUSCULOSKELETAL: NEGATIVE for significant arthralgias or myalgia  NEURO: NEGATIVE for weakness, dizziness or paresthesias  ENDOCRINE: NEGATIVE for temperature intolerance, skin/hair changes  HEME: NEGATIVE for bleeding problems  PSYCHIATRIC: NEGATIVE for changes in mood or affect    EXAM:   /78   Pulse 70   Temp 97.9  F (36.6  C) (Oral)   Resp 16   Ht 1.708 m (5' 7.25\")   Wt 75.8 kg (167 lb)   LMP 02/11/2019   SpO2 100%   BMI 25.96 kg/m      GENERAL APPEARANCE: healthy, alert and no distress     EYES: EOMI, PERRL     HENT: ear canals and TM's normal and nose and mouth without ulcers or lesions     NECK: no adenopathy, no asymmetry, masses, or scars and thyroid normal to palpation     RESP: lungs clear to auscultation - no rales, rhonchi or wheezes     CV: regular rates and rhythm, normal S1 S2, no S3 or S4 and no murmur, click or rub     ABDOMEN:  soft, nontender, no HSM or masses and bowel sounds normal     MS: extremities normal- no gross deformities noted, no evidence of inflammation in joints, FROM in all extremities.     SKIN: no suspicious lesions or rashes     NEURO: Normal strength and tone, sensory exam grossly normal, mentation intact and speech normal     PSYCH: mentation appears normal. and affect normal/bright    DIAGNOSTICS:     Labs Drawn and in Process:   Unresulted Labs Ordered in the Past 30 Days of this Admission     Date and Time Order Name Status Description    4/4/2019 0803 HCG QUALITATIVE In process     4/4/2019 0803 CBC WITH PLATELETS DIFFERENTIAL In process     4/4/2019 0803 HIV ANTIGEN ANTIBODY COMBO In process           Recent Labs   Lab Test 01/22/19  1224 03/27/18  1421   HGB 13.9  --    A1C  --  5.0        IMPRESSION:   Reason for surgery/procedure: hysterectomy due to " endometriosis and uterine leiomyoma    The proposed surgical procedure is considered LOW risk.    REVISED CARDIAC RISK INDEX  The patient has the following serious cardiovascular risks for perioperative complications such as (MI, PE, VFib and 3  AV Block):  No serious cardiac risks  INTERPRETATION: 0 risks: Class I (very low risk - 0.4% complication rate)    The patient has the following additional risks for perioperative complications:  No identified additional risks      ICD-10-CM    1. Preop general physical exam Z01.818 HIV Antigen Antibody Combo     CBC with platelets and differential     HCG qualitative, Blood (FTH117)   2. Tobacco use disorder F17.200 TOBACCO CESSATION - FOR HEALTH MAINTENANCE   3. Screening for HIV (human immunodeficiency virus) Z11.4 HIV Antigen Antibody Combo    per protocol   4. Uterine leiomyoma, unspecified location D25.9    5. Endometriosis N80.9        RECOMMENDATIONS:       --Patient is to take all scheduled medications on the day of surgery EXCEPT for modifications listed below.  --Patient is on no chronic medications  Reminded not to take blood thinning medications such as ibuprofen, aspirin, or fish oil    APPROVAL GIVEN to proceed with proposed procedure, without further diagnostic evaluation     Written by AR Ramey under the supervision of SE Zabala.     Student exam observed as well as completed by provider. Agree with above documentation. LESLY Mendes, SE-BC    Signed Electronically by: SE Zabala    Copy of this evaluation report is provided to requesting physician.    [facility] Preop Guidelines    Revised Cardiac Risk Index

## 2019-04-05 ASSESSMENT — ANXIETY QUESTIONNAIRES: GAD7 TOTAL SCORE: 4

## 2019-04-05 NOTE — RESULT ENCOUNTER NOTE
Justino Reece,    Thank you for your recent office visit.    Here are your recent results.  Normal lab results.  I hope your surgery goes well!  Please follow up in clinic as needed.     Feel free to contact me via [employer] or call the clinic at [phone].    Sincerely,    LESLY Mendes, FNP-BC

## 2019-04-08 DIAGNOSIS — Z01.818 PRE-OP EXAM: ICD-10-CM

## 2019-04-08 DIAGNOSIS — D25.9 FIBROID UTERUS: ICD-10-CM

## 2019-04-08 LAB
ABO + RH BLD: NORMAL
ABO + RH BLD: NORMAL
BLD GP AB SCN SERPL QL: NORMAL
BLOOD BANK CMNT PATIENT-IMP: NORMAL
ERYTHROCYTE [DISTWIDTH] IN BLOOD BY AUTOMATED COUNT: 13 % (ref 10–15)
HCT VFR BLD AUTO: 40.5 % (ref 35–47)
HGB BLD-MCNC: 13.9 G/DL (ref 11.7–15.7)
MCH RBC QN AUTO: 30.8 PG (ref 26.5–33)
MCHC RBC AUTO-ENTMCNC: 34.3 G/DL (ref 31.5–36.5)
MCV RBC AUTO: 90 FL (ref 78–100)
PLATELET # BLD AUTO: 209 10E9/L (ref 150–450)
RBC # BLD AUTO: 4.51 10E12/L (ref 3.8–5.2)
SPECIMEN EXP DATE BLD: NORMAL
WBC # BLD AUTO: 5.1 10E9/L (ref 4–11)

## 2019-04-08 PROCEDURE — 86901 BLOOD TYPING SEROLOGIC RH(D): CPT | Performed by: OBSTETRICS & GYNECOLOGY

## 2019-04-08 PROCEDURE — 86850 RBC ANTIBODY SCREEN: CPT | Performed by: OBSTETRICS & GYNECOLOGY

## 2019-04-08 PROCEDURE — 36415 COLL VENOUS BLD VENIPUNCTURE: CPT | Performed by: OBSTETRICS & GYNECOLOGY

## 2019-04-08 PROCEDURE — 85027 COMPLETE CBC AUTOMATED: CPT | Performed by: OBSTETRICS & GYNECOLOGY

## 2019-04-08 PROCEDURE — 86900 BLOOD TYPING SEROLOGIC ABO: CPT | Performed by: OBSTETRICS & GYNECOLOGY

## 2019-04-09 ENCOUNTER — ANESTHESIA EVENT (OUTPATIENT)
Dept: SURGERY | Facility: CLINIC | Age: 34
End: 2019-04-09
Payer: COMMERCIAL

## 2019-04-10 ENCOUNTER — SURGERY (OUTPATIENT)
Age: 34
End: 2019-04-10
Payer: COMMERCIAL

## 2019-04-10 ENCOUNTER — ANESTHESIA (OUTPATIENT)
Dept: SURGERY | Facility: CLINIC | Age: 34
End: 2019-04-10
Payer: COMMERCIAL

## 2019-04-10 ENCOUNTER — HOSPITAL ENCOUNTER (OUTPATIENT)
Facility: CLINIC | Age: 34
Discharge: HOME | End: 2019-04-10
Attending: OBSTETRICS & GYNECOLOGY | Admitting: OBSTETRICS & GYNECOLOGY
Payer: COMMERCIAL

## 2019-04-10 VITALS
RESPIRATION RATE: 12 BRPM | TEMPERATURE: 97.9 F | WEIGHT: 164.02 LBS | HEIGHT: 67 IN | BODY MASS INDEX: 25.74 KG/M2 | DIASTOLIC BLOOD PRESSURE: 76 MMHG | OXYGEN SATURATION: 99 % | SYSTOLIC BLOOD PRESSURE: 118 MMHG | HEART RATE: 61 BPM

## 2019-04-10 DIAGNOSIS — Z90.710 S/P LAPAROSCOPIC HYSTERECTOMY: Primary | ICD-10-CM

## 2019-04-10 LAB — GLUCOSE BLDC GLUCOMTR-MCNC: 81 MG/DL (ref 70–99)

## 2019-04-10 PROCEDURE — 25000128 H RX IP 250 OP 636: Performed by: ANESTHESIOLOGY

## 2019-04-10 PROCEDURE — 82962 GLUCOSE BLOOD TEST: CPT

## 2019-04-10 PROCEDURE — 25000125 ZZHC RX 250: Performed by: NURSE ANESTHETIST, CERTIFIED REGISTERED

## 2019-04-10 PROCEDURE — 25800030 ZZH RX IP 258 OP 636: Performed by: ANESTHESIOLOGY

## 2019-04-10 PROCEDURE — 25000128 H RX IP 250 OP 636: Performed by: OBSTETRICS & GYNECOLOGY

## 2019-04-10 PROCEDURE — 58571 TLH W/T/O 250 G OR LESS: CPT | Mod: 80 | Performed by: OBSTETRICS & GYNECOLOGY

## 2019-04-10 PROCEDURE — 71000014 ZZH RECOVERY PHASE 1 LEVEL 2 FIRST HR: Performed by: OBSTETRICS & GYNECOLOGY

## 2019-04-10 PROCEDURE — C1765 ADHESION BARRIER: HCPCS | Performed by: OBSTETRICS & GYNECOLOGY

## 2019-04-10 PROCEDURE — 25000128 H RX IP 250 OP 636: Performed by: NURSE ANESTHETIST, CERTIFIED REGISTERED

## 2019-04-10 PROCEDURE — 36000064 ZZH SURGERY LEVEL 4 EA 15 ADDTL MIN - UMMC: Performed by: OBSTETRICS & GYNECOLOGY

## 2019-04-10 PROCEDURE — 71000027 ZZH RECOVERY PHASE 2 EACH 15 MINS: Performed by: OBSTETRICS & GYNECOLOGY

## 2019-04-10 PROCEDURE — 37000009 ZZH ANESTHESIA TECHNICAL FEE, EACH ADDTL 15 MIN: Performed by: OBSTETRICS & GYNECOLOGY

## 2019-04-10 PROCEDURE — 25000132 ZZH RX MED GY IP 250 OP 250 PS 637: Performed by: ANESTHESIOLOGY

## 2019-04-10 PROCEDURE — 37000008 ZZH ANESTHESIA TECHNICAL FEE, 1ST 30 MIN: Performed by: OBSTETRICS & GYNECOLOGY

## 2019-04-10 PROCEDURE — 40000171 ZZH STATISTIC PRE-PROCEDURE ASSESSMENT III: Performed by: OBSTETRICS & GYNECOLOGY

## 2019-04-10 PROCEDURE — 36000062 ZZH SURGERY LEVEL 4 1ST 30 MIN - UMMC: Performed by: OBSTETRICS & GYNECOLOGY

## 2019-04-10 PROCEDURE — 25000566 ZZH SEVOFLURANE, EA 15 MIN: Performed by: OBSTETRICS & GYNECOLOGY

## 2019-04-10 PROCEDURE — 88309 TISSUE EXAM BY PATHOLOGIST: CPT | Performed by: OBSTETRICS & GYNECOLOGY

## 2019-04-10 PROCEDURE — 58571 TLH W/T/O 250 G OR LESS: CPT | Mod: GC | Performed by: OBSTETRICS & GYNECOLOGY

## 2019-04-10 PROCEDURE — 25000125 ZZHC RX 250: Performed by: ANESTHESIOLOGY

## 2019-04-10 PROCEDURE — C9290 INJ, BUPIVACAINE LIPOSOME: HCPCS | Performed by: ANESTHESIOLOGY

## 2019-04-10 PROCEDURE — 25000132 ZZH RX MED GY IP 250 OP 250 PS 637: Performed by: OBSTETRICS & GYNECOLOGY

## 2019-04-10 PROCEDURE — 27210794 ZZH OR GENERAL SUPPLY STERILE: Performed by: OBSTETRICS & GYNECOLOGY

## 2019-04-10 RX ORDER — ONDANSETRON 4 MG/1
4 TABLET, ORALLY DISINTEGRATING ORAL EVERY 8 HOURS PRN
Qty: 5 TABLET | Refills: 0 | Status: SHIPPED | OUTPATIENT
Start: 2019-04-10 | End: 2019-04-26

## 2019-04-10 RX ORDER — ACETAMINOPHEN 325 MG/1
975 TABLET ORAL ONCE
Status: COMPLETED | OUTPATIENT
Start: 2019-04-10 | End: 2019-04-10

## 2019-04-10 RX ORDER — NALOXONE HYDROCHLORIDE 0.4 MG/ML
.1-.4 INJECTION, SOLUTION INTRAMUSCULAR; INTRAVENOUS; SUBCUTANEOUS
Status: DISCONTINUED | OUTPATIENT
Start: 2019-04-10 | End: 2019-04-10 | Stop reason: HOSPADM

## 2019-04-10 RX ORDER — IBUPROFEN 600 MG/1
600 TABLET, FILM COATED ORAL
Status: DISCONTINUED | OUTPATIENT
Start: 2019-04-10 | End: 2019-04-10 | Stop reason: HOSPADM

## 2019-04-10 RX ORDER — SODIUM CHLORIDE, SODIUM LACTATE, POTASSIUM CHLORIDE, CALCIUM CHLORIDE 600; 310; 30; 20 MG/100ML; MG/100ML; MG/100ML; MG/100ML
INJECTION, SOLUTION INTRAVENOUS CONTINUOUS
Status: DISCONTINUED | OUTPATIENT
Start: 2019-04-10 | End: 2019-04-10 | Stop reason: HOSPADM

## 2019-04-10 RX ORDER — PHENAZOPYRIDINE HYDROCHLORIDE 200 MG/1
200 TABLET, FILM COATED ORAL ONCE
Status: COMPLETED | OUTPATIENT
Start: 2019-04-10 | End: 2019-04-10

## 2019-04-10 RX ORDER — ONDANSETRON 2 MG/ML
INJECTION INTRAMUSCULAR; INTRAVENOUS PRN
Status: DISCONTINUED | OUTPATIENT
Start: 2019-04-10 | End: 2019-04-10

## 2019-04-10 RX ORDER — FENTANYL CITRATE 50 UG/ML
25-50 INJECTION, SOLUTION INTRAMUSCULAR; INTRAVENOUS
Status: DISCONTINUED | OUTPATIENT
Start: 2019-04-10 | End: 2019-04-10 | Stop reason: HOSPADM

## 2019-04-10 RX ORDER — HYDROXYZINE HYDROCHLORIDE 25 MG/1
25 TABLET, FILM COATED ORAL
Status: DISCONTINUED | OUTPATIENT
Start: 2019-04-10 | End: 2019-04-10 | Stop reason: HOSPADM

## 2019-04-10 RX ORDER — LIDOCAINE HYDROCHLORIDE 20 MG/ML
INJECTION, SOLUTION INFILTRATION; PERINEURAL PRN
Status: DISCONTINUED | OUTPATIENT
Start: 2019-04-10 | End: 2019-04-10

## 2019-04-10 RX ORDER — CEFAZOLIN SODIUM 2 G/100ML
2 INJECTION, SOLUTION INTRAVENOUS
Status: COMPLETED | OUTPATIENT
Start: 2019-04-10 | End: 2019-04-10

## 2019-04-10 RX ORDER — HYDROCODONE BITARTRATE AND ACETAMINOPHEN 5; 325 MG/1; MG/1
1 TABLET ORAL
Status: DISCONTINUED | OUTPATIENT
Start: 2019-04-10 | End: 2019-04-10 | Stop reason: HOSPADM

## 2019-04-10 RX ORDER — PROPOFOL 10 MG/ML
INJECTION, EMULSION INTRAVENOUS PRN
Status: DISCONTINUED | OUTPATIENT
Start: 2019-04-10 | End: 2019-04-10

## 2019-04-10 RX ORDER — DEXAMETHASONE SODIUM PHOSPHATE 4 MG/ML
INJECTION, SOLUTION INTRA-ARTICULAR; INTRALESIONAL; INTRAMUSCULAR; INTRAVENOUS; SOFT TISSUE PRN
Status: DISCONTINUED | OUTPATIENT
Start: 2019-04-10 | End: 2019-04-10

## 2019-04-10 RX ORDER — ACETAMINOPHEN 325 MG/1
325-650 TABLET ORAL EVERY 4 HOURS PRN
Qty: 30 TABLET | Refills: 0 | Status: SHIPPED | OUTPATIENT
Start: 2019-04-10 | End: 2019-04-26

## 2019-04-10 RX ORDER — IBUPROFEN 600 MG/1
600 TABLET, FILM COATED ORAL EVERY 6 HOURS PRN
Qty: 30 TABLET | Refills: 0 | Status: SHIPPED | OUTPATIENT
Start: 2019-04-10 | End: 2019-11-08

## 2019-04-10 RX ORDER — FLUMAZENIL 0.1 MG/ML
0.2 INJECTION, SOLUTION INTRAVENOUS
Status: DISCONTINUED | OUTPATIENT
Start: 2019-04-10 | End: 2019-04-10 | Stop reason: HOSPADM

## 2019-04-10 RX ORDER — ONDANSETRON 4 MG/1
4 TABLET, ORALLY DISINTEGRATING ORAL
Status: DISCONTINUED | OUTPATIENT
Start: 2019-04-10 | End: 2019-04-10 | Stop reason: HOSPADM

## 2019-04-10 RX ORDER — HYDROMORPHONE HYDROCHLORIDE 1 MG/ML
.3-.5 INJECTION, SOLUTION INTRAMUSCULAR; INTRAVENOUS; SUBCUTANEOUS EVERY 5 MIN PRN
Status: DISCONTINUED | OUTPATIENT
Start: 2019-04-10 | End: 2019-04-10 | Stop reason: HOSPADM

## 2019-04-10 RX ORDER — ONDANSETRON 4 MG/1
4 TABLET, ORALLY DISINTEGRATING ORAL EVERY 30 MIN PRN
Status: DISCONTINUED | OUTPATIENT
Start: 2019-04-10 | End: 2019-04-10 | Stop reason: HOSPADM

## 2019-04-10 RX ORDER — ONDANSETRON 2 MG/ML
4 INJECTION INTRAMUSCULAR; INTRAVENOUS EVERY 30 MIN PRN
Status: DISCONTINUED | OUTPATIENT
Start: 2019-04-10 | End: 2019-04-10 | Stop reason: HOSPADM

## 2019-04-10 RX ORDER — EPHEDRINE SULFATE 50 MG/ML
INJECTION, SOLUTION INTRAMUSCULAR; INTRAVENOUS; SUBCUTANEOUS PRN
Status: DISCONTINUED | OUTPATIENT
Start: 2019-04-10 | End: 2019-04-10

## 2019-04-10 RX ORDER — CEFAZOLIN SODIUM 1 G/3ML
1 INJECTION, POWDER, FOR SOLUTION INTRAMUSCULAR; INTRAVENOUS SEE ADMIN INSTRUCTIONS
Status: DISCONTINUED | OUTPATIENT
Start: 2019-04-10 | End: 2019-04-10 | Stop reason: HOSPADM

## 2019-04-10 RX ORDER — BUPIVACAINE HYDROCHLORIDE AND EPINEPHRINE 2.5; 5 MG/ML; UG/ML
INJECTION, SOLUTION INFILTRATION; PERINEURAL PRN
Status: DISCONTINUED | OUTPATIENT
Start: 2019-04-10 | End: 2019-04-10

## 2019-04-10 RX ORDER — LIDOCAINE 40 MG/G
CREAM TOPICAL
Status: DISCONTINUED | OUTPATIENT
Start: 2019-04-10 | End: 2019-04-10 | Stop reason: HOSPADM

## 2019-04-10 RX ORDER — OXYCODONE HYDROCHLORIDE 5 MG/1
5 TABLET ORAL EVERY 6 HOURS PRN
Qty: 5 TABLET | Refills: 0 | Status: SHIPPED | OUTPATIENT
Start: 2019-04-10 | End: 2019-04-26

## 2019-04-10 RX ORDER — CELECOXIB 200 MG/1
200 CAPSULE ORAL ONCE
Status: COMPLETED | OUTPATIENT
Start: 2019-04-10 | End: 2019-04-10

## 2019-04-10 RX ORDER — KETOROLAC TROMETHAMINE 30 MG/ML
INJECTION, SOLUTION INTRAMUSCULAR; INTRAVENOUS PRN
Status: DISCONTINUED | OUTPATIENT
Start: 2019-04-10 | End: 2019-04-10

## 2019-04-10 RX ORDER — OXYCODONE HYDROCHLORIDE 5 MG/1
5 TABLET ORAL EVERY 6 HOURS PRN
Status: DISCONTINUED | OUTPATIENT
Start: 2019-04-10 | End: 2019-04-10 | Stop reason: HOSPADM

## 2019-04-10 RX ORDER — GABAPENTIN 100 MG/1
300 CAPSULE ORAL ONCE
Status: COMPLETED | OUTPATIENT
Start: 2019-04-10 | End: 2019-04-10

## 2019-04-10 RX ORDER — SCOLOPAMINE TRANSDERMAL SYSTEM 1 MG/1
1 PATCH, EXTENDED RELEASE TRANSDERMAL ONCE
Status: DISCONTINUED | OUTPATIENT
Start: 2019-04-10 | End: 2019-04-10 | Stop reason: HOSPADM

## 2019-04-10 RX ADMIN — ROCURONIUM BROMIDE 50 MG: 10 INJECTION INTRAVENOUS at 07:38

## 2019-04-10 RX ADMIN — DEXAMETHASONE SODIUM PHOSPHATE 8 MG: 4 INJECTION, SOLUTION INTRAMUSCULAR; INTRAVENOUS at 07:44

## 2019-04-10 RX ADMIN — FENTANYL CITRATE 50 MCG: 50 INJECTION INTRAMUSCULAR; INTRAVENOUS at 06:56

## 2019-04-10 RX ADMIN — Medication 10 MG: at 10:16

## 2019-04-10 RX ADMIN — KETOROLAC TROMETHAMINE 30 MG: 30 INJECTION, SOLUTION INTRAMUSCULAR at 10:15

## 2019-04-10 RX ADMIN — Medication 0.5 MG: at 10:49

## 2019-04-10 RX ADMIN — BUPIVACAINE HYDROCHLORIDE AND EPINEPHRINE BITARTRATE 20 ML: 2.5; .005 INJECTION, SOLUTION INFILTRATION; PERINEURAL at 07:00

## 2019-04-10 RX ADMIN — PROPOFOL 200 MG: 10 INJECTION, EMULSION INTRAVENOUS at 07:38

## 2019-04-10 RX ADMIN — Medication 5 MG: at 07:59

## 2019-04-10 RX ADMIN — SODIUM CHLORIDE, POTASSIUM CHLORIDE, SODIUM LACTATE AND CALCIUM CHLORIDE: 600; 310; 30; 20 INJECTION, SOLUTION INTRAVENOUS at 08:35

## 2019-04-10 RX ADMIN — ACETAMINOPHEN 975 MG: 325 TABLET, FILM COATED ORAL at 07:14

## 2019-04-10 RX ADMIN — Medication 5 MG: at 08:08

## 2019-04-10 RX ADMIN — MIDAZOLAM 1 MG: 1 INJECTION INTRAMUSCULAR; INTRAVENOUS at 06:56

## 2019-04-10 RX ADMIN — OXYCODONE HYDROCHLORIDE 5 MG: 5 TABLET ORAL at 11:46

## 2019-04-10 RX ADMIN — ROCURONIUM BROMIDE 15 MG: 10 INJECTION INTRAVENOUS at 08:40

## 2019-04-10 RX ADMIN — SUGAMMADEX 150 MG: 100 INJECTION, SOLUTION INTRAVENOUS at 10:19

## 2019-04-10 RX ADMIN — LIDOCAINE HYDROCHLORIDE 60 MG: 20 INJECTION, SOLUTION INFILTRATION; PERINEURAL at 07:36

## 2019-04-10 RX ADMIN — ONDANSETRON 4 MG: 2 INJECTION INTRAMUSCULAR; INTRAVENOUS at 10:17

## 2019-04-10 RX ADMIN — MIDAZOLAM 2 MG: 1 INJECTION INTRAMUSCULAR; INTRAVENOUS at 07:30

## 2019-04-10 RX ADMIN — BUPIVACAINE 20 ML: 13.3 INJECTION, SUSPENSION, LIPOSOMAL INFILTRATION at 07:00

## 2019-04-10 RX ADMIN — CELECOXIB 200 MG: 200 CAPSULE ORAL at 07:13

## 2019-04-10 RX ADMIN — CEFAZOLIN SODIUM 1 G: 2 INJECTION, SOLUTION INTRAVENOUS at 09:46

## 2019-04-10 RX ADMIN — Medication 5 MG: at 07:52

## 2019-04-10 RX ADMIN — CEFAZOLIN SODIUM 2 G: 2 INJECTION, SOLUTION INTRAVENOUS at 07:46

## 2019-04-10 RX ADMIN — PHENAZOPYRIDINE HYDROCHLORIDE 200 MG: 200 TABLET, FILM COATED ORAL at 06:03

## 2019-04-10 RX ADMIN — SODIUM CHLORIDE, POTASSIUM CHLORIDE, SODIUM LACTATE AND CALCIUM CHLORIDE: 600; 310; 30; 20 INJECTION, SOLUTION INTRAVENOUS at 07:31

## 2019-04-10 RX ADMIN — GABAPENTIN 300 MG: 300 CAPSULE ORAL at 07:12

## 2019-04-10 RX ADMIN — FENTANYL CITRATE 100 MCG: 50 INJECTION INTRAMUSCULAR; INTRAVENOUS at 07:36

## 2019-04-10 RX ADMIN — FENTANYL CITRATE 100 MCG: 50 INJECTION INTRAMUSCULAR; INTRAVENOUS at 08:11

## 2019-04-10 ASSESSMENT — MIFFLIN-ST. JEOR: SCORE: 1480.5

## 2019-04-10 NOTE — DISCHARGE INSTRUCTIONS
Scopolamine Patch  (Absorbed through the skin)    The Scopolamine Patch prevents nausea and vomiting caused by motion sickness or anesthesia and surgery in adults.    Brand Name(s): Transderm Scop, Transderm-Scope  There may be other brand names for this medicine.    When This Medicine Should Not Be Used:  You should not use this medicine if you have had an allergic reaction to scopolamine, or if you have narrow angle glaucoma.    How to Use This Medicine:    Your doctor will tell you how many patches to use, where to apply them, and how often to apply them.     Do not use more patches or apply them more often than your doctor tells you to.    This medicine comes with patient instructions. Read and follow these instructions carefully. Ask your doctor or pharmacist if you have any questions.    To prevent motion sickness, apply the patch at least 4 hours before you need it.    Wash and dry your hands thoroughly before applying the patch.    Leave the patch in its sealed wrapper until you are ready to put it on. Tear the wrapper open carefully. NEVER CUT the wrapper or the patch with scissors. Do not use any patch that has been cut by accident.    Take the liner off the sticky side before applying.    Apply the patch to dry, hairless skin behind the ear.    If the patch is loose or falls off, apply a new patch at a different place behind the ear.    After you take off the patch, wash the place where the patch was and your hands thoroughly.    Only one patch should be used at any time.    If a dose is missed:  If you forget to wear or change a patch, put one on as soon as you can. If it is almost time to put on your next patch, wait until then to apply a new patch and skip the one you missed. Do not apply extra patches to make up for a missed dose.    How to Store and Dispose of This Medicine:    Store the patches at room temperature in a closed container, away from heat, moisture and direct light.    Fold the used  patch in half with the sticky sides together. Throw any used patch away so that children or pets cannot get to it. You will also need to throw away old patches after the expiration date has passed.    Keep all medicine away from children and never share your medicine with anyone.    Ask your doctor or pharmacist before using any other medicine, including over-the-counter medicines, vitamins, and herbal products.  Tell your doctor if you are using any medicines that make you sleepy. These include sleeping pills, cold and allergy medicine, narcotic pain relievers and sedatives.     Tell your doctor if you are using any medicine that make you sleepy. These include sleeping pills, janak and allergy medicine, narcotic pain relievers and sedatives.    Do not drink alcohol while you are using this medicine.     Warnings While Using This Medicine:    Make sure your doctor knows if you are pregnant or breastfeeding or if you have glaucoma, prostate problems, trouble urinating, blocked bowels, liver disease, kidney disease or a history of seizures or mental illness.    This medicine can cause blurring of vision and other vision problems if it comes in contact with the eyes. This medicine may also cause problems with urination. If any of these reactions occur, remove the patch and call your doctor right away.    This medicine may make you dizzy or drowsy. Avoid driving, using machines, or doing anything else that could be dangerous if you are not alert. If you plan to participate in underwater sports, this medicine may cause disorienting effects. If this is a concern for you, talk with your doctor.    This medicine may make you sweat less and cause your body to get too hot. Be careful in hot weather, when you are exercising or if using sauna or whirlpool.    Make sure any doctor or dentist who treats you knows that you are using this medicine. This medicine may affect the results of certain medical tests.    Skin burns have  been reported at the patch site in several patients wearing an aluminized transdermal system during a magnetic resonance imaging scan (MRI). Because Transderm Scop contains aluminum, it is recommended to remove the system before undergoing an MRI.    Call your doctor right away if you notice any of these side effects:    Allergic reaction: Itching or hives, swelling in your face or hands, swelling or tingling in your mouth or throat, chest tightness, trouble breathing    Blurred vision    Confusion or memory loss    Fast, slow or uneven heartbeat    Lightheadedness, dizziness, drowsiness or fainting    Seeing, hearing or feeling things that are not there    Severe eye pain    Trouble urinating    If you notice these less serious side effects, talk with your doctor:    Dry mouth    Dry, itchy or red eyes    Restlessness    Skin rash or redness    If you notice other side effects that you think are caused by this medicine, tell your doctor immediately.    Rev. 4/2014      Same-Day Surgery   Adult Discharge Orders & Instructions     For 24 hours after surgery:  1. Get plenty of rest.  A responsible adult must stay with you for at least 24 hours after you leave the hospital.   2. Pain medication can slow your reflexes. Do not drive or use heavy equipment.  If you have weakness or tingling, don't drive or use heavy equipment until this feeling goes away.  3. Mixing alcohol and pain medication can cause dizziness and slow your breathing. It can even be fatal. Do not drink alcohol while taking pain medication.  4. Avoid strenuous or risky activities.  Ask for help when climbing stairs.   5. You may feel lightheaded.  If so, sit for a few minutes before standing.  Have someone help you get up.   6. If you have nausea (feel sick to your stomach), drink only clear liquids such as apple juice, ginger ale, broth or 7-Up.  Rest may also help.  Be sure to drink enough fluids.  Move to a regular diet as you feel able. Take pain  medications with a small amount of solid food, such as toast or crackers, to avoid nausea.   7. A slight fever is normal. Call the doctor if your fever is over 100 F (37.7 C) (taken under the tongue) or lasts longer than 24 hours.  8. You may have a dry mouth, muscle aches, trouble sleeping or a sore throat.  These symptoms should go away after 24 hours.  9. Do not make important or legal decisions.   Pain Management:      1. Take pain medication (if prescribed) for pain as directed by your physician.        2. WARNING: If the pain medication you have been prescribed contains Tylenol  (acetaminophen), DO NOT take additional doses of Tylenol (acetaminophen).     Call your doctor for any of the followin.  Signs of infection (fever, growing tenderness at the surgery site, severe pain, a large amount of drainage or bleeding, foul-smelling drainage, redness, swelling).    2.  It has been over 8 to 10 hours since surgery and you are still not able to urinate (pee).    3.  Headache for over 24 hours.    4.  Numbness, tingling or weakness the day after surgery (if you had spinal anesthesia).  To contact a doctor, call _____________________________________ or:      [phone] and ask for the Resident On Call for:          __________________________________________ (answered 24 hours a day)      Emergency Department:  [facility]: [phone]  [facility]: [phone]               Rev. 10/2014   Discharge Instructions:   Following a Laparoscopy    Comfort:    The amount of discomfort you can expect is very unpredictable.     If you have pain that cannot be controlled with non aspirin medication or with the prescription medication you may have received, you should notify your physician.     You May Experience:    Abdominal tenderness; abdominal cramps (like menstrual cramps).    Low back ache or discomfort radiating to your shoulders, chest, back or neck. This is a result of the  "gas used to inflate your abdomen during surgery. This gas is absorbed in 24 to 36 hours. The \"knee chest\" position will help relieve this discomfort.    Sore throat for a day or two resulting from the anesthesia tube used during surgery. You may use throat lozenges to help relieve this discomfort.    Black and blue marks on your abdomen.    Drainage:    You may expect a small amount of drainage from the incision on your abdomen and you may change the bandage when necessary.    You may also have a small amount of vaginal drainage for 3 to 4 days; this is normal and no cause for concern. If excessive bleeding occurs, notify your physician.    Do not douche, and use a pad rather than tampons. Do not resume intercourse for at least one week or until bleeding has ceased.    Home Activity:    The day of surgery spend a quiet day at home.    Increase activity as tolerated.    You may bathe or shower, do not soak in bath tub or scrub incisions.    You have no restrictions on your diet. Following surgery, drink plenty of fluids and eat a light meal.    The anesthesia may produce some nausea. If you feel nauseated, stay in bed, keep your head down and try drinking fluids such as Seven-Up, tea or soup.    Notify Physician at Once IF:    You have a fever over 100 degrees. A low grade fever (under 100 degrees) is usual after surgery.    You have severe pain.    You have a large amount of bleeding or drainage.    Rev. 4/2014    "

## 2019-04-10 NOTE — ANESTHESIA POSTPROCEDURE EVALUATION
Anesthesia POST Procedure Evaluation    Patient: Shanell Alfaro   MRN:     [id number] Gender:   female   Age:    33 year old :      1985        Preoperative Diagnosis: Fibroids Bleeding and Pain   Procedure(s):  TOTAL LAPAROSCOPIC Vaginal HYSTERECTOMY   Postop Comments: No value filed.       Anesthesia Type:  General    Reportable Event: NO     PAIN: Uncomplicated   Sign Out status: Comfortable, Well controlled pain     PONV: No PONV   Sign Out status:  No Nausea or Vomiting     Neuro/Psych: Uneventful perioperative course   Sign Out Status: Preoperative baseline; Age appropriate mentation     Airway/Resp.: Uneventful perioperative course   Sign Out Status: Non labored breathing, age appropriate RR; Resp. Status within EXPECTED Parameters     CV: Uneventful perioperative course   Sign Out status: Appropriate BP and perfusion indices; Appropriate HR/Rhythm     Disposition:   Sign Out in:  PACU  Disposition:  Floor  Recovery Course: Uneventful  Follow-Up: Not required     Comments/Narrative:  Pt. Doing well. No PONV, pain tolerable.           Last Anesthesia Record Vitals:  CRNA VITALS  4/10/2019 0957 - 4/10/2019 1057      4/10/2019             Pulse:  102    SpO2:  100 %    Resp Rate (observed):  21          Last PACU Vitals:  Vitals Value Taken Time   /74 4/10/2019 11:13 AM   Temp 36.6  C (97.9  F) 4/10/2019 11:00 AM   Pulse 76 4/10/2019 11:13 AM   Resp 5 4/10/2019 11:13 AM   SpO2 99 % 4/10/2019 11:14 AM   Temp src     NIBP     Pulse     SpO2     Resp     Temp     Ht Rate     Temp 2     Vitals shown include unvalidated device data.      Electronically Signed By: Millie Mendez MD, April 10, 2019, 11:16 AM

## 2019-04-10 NOTE — ANESTHESIA CARE TRANSFER NOTE
Patient: Shanell Alfaro    Procedure(s):  TOTAL LAPAROSCOPIC Vaginal HYSTERECTOMY    Diagnosis: Fibroids Bleeding and Pain  Diagnosis Additional Information: No value filed.    Anesthesia Type:   No value filed.     Note:  Airway :Face Mask  Patient transferred to:PACU  Comments: All vss report to rnHandoff Report: Identifed the Patient, Identified the Reponsible Provider, Reviewed the pertinent medical history, Discussed the surgical course, Reviewed Intra-OP anesthesia mangement and issues during anesthesia, Set expectations for post-procedure period and Allowed opportunity for questions and acknowledgement of understanding      Vitals: (Last set prior to Anesthesia Care Transfer)    CRNA VITALS  4/10/2019 0957 - 4/10/2019 1035      4/10/2019             Pulse:  102    SpO2:  100 %    Resp Rate (observed):  21                Electronically Signed By: LESLY Cox CRNA  April 10, 2019  10:35 AM

## 2019-04-10 NOTE — OP NOTE
Gynecology Operative Note    Shanell Alfaro  [id number]  1985     Date of surgery:  04/10/19    Surgeon:  Daya Queen MD    Assistants:    MD Clarisa Martin MD PGY3     Pre-operative diagnosis:   Abnormal uterine bleeding, fibroids, pain  Post-perative diagnosis:   Abnormal uterine bleeding, fibroids, pain  Anesthesia:  General  Procedure:  Total laparoscopic hysterectomy, bilateral salpingectomy  Specimen:  Uterus and cervix, bilateral fallopian tubes  Condition:  Stable to PACU    Drains:  Geronimo    EBL:  50 mL   IVF:  1800 mL crystalloid  UOP:  1000 mL clear yellow    Findings: No evidence of injury from abdominal injury.  Normal laparoscopic abdominal survey including appendix, liver edge.  Normal fallopian tubes. Ureters visualized and appeared normal.  Tiny subcentimeter paratubal cyst on left.  Uterus with small approximately 2-3 cm fundal fibroid.  The uterus otherwise appears normal.  The ovaries appeared normal bilaterally.  2-3 tiny possible areas of endometriosis implants in pelvis.  Normal external genitalia, vagina and cervix.     Indication:  Shanell Alfaro is a 33-year-old G0 with long-standing abnormal uterine bleeding and pelvic pain.  Pelvic ultrasound showed multiple small fundal fibroids.  Based on her history endometriosis was also suspected.  The patient desired definitive surgical management.  Risks, benefits, and alternatives to hysterectomy were discussed and the patient desired to proceed.  Written informed consent was obtained. Bilateral salpingectomy was also recommended and accepted as well.      Procedure:  The patient was taken to the OR where general endotracheal anesthesia was administered without complication. She was placed in a dorsal lithotomy position using yellow fin stirrups. Exam under anesthesia revealed the above listed findings. She was then prepped and draped in usual sterile fashion. After time out was completed, a  brooks catheter was placed in the bladder and V-Care uterine manipulator was placed and used for uterine manipulation. Attention was then turned towards the abdomen.  The inferior aspect of the umbilicus was everted digitally. A vertical incision was made in the umbilicus with a scalpel and a Veress needle was inserted into the abdomen. Intraabdominal placement was verified with a saline drop test. The abdomen was insufflated to a pressure of 15 mmHg. Opening pressure was 6 mmHg. A 5 mm trocar was then inserted into the umbilicus and a 5 mm laparoscope was introduced. Laparoscopic findings were as noted above. The patient was placed in Trendelenburg position. Two additional 5mm ports were placed in the left and right lower quadrants under direct visualization. The bowel was swept out of the pelvis.     The right and left ureters were identified easily on the pelvic sidewalls.  Attention was then turned to the left salpingectomy.  The Gyrus device was used to transect the left fallopian tube at the cornea.  The mesosalpinx below the tube was then serially cauterized and cut until reaching the distal end with care to avoid the IP ligament.  This procedure was then repeated identically on the right side.  Both tubes were removed thru the sideport and the left tube was marked with a stitch.     Attention was then turned to the hysterectomy. The round ligament on the right side was transected near the cornua and taken down serially.  The anterior and posterior leaves of the broad ligament was then  and taken down medially to the level of the cervix.   This procedure was then repeated on the contralateral side. Next, the anterior broad ligament was taken down bilaterally and medially to create a bladder flap. The V-Care cup was identified. Uterine arteries were dissected off of the cervix by grasping just lateral to the cervix, desiccating with the Gyrus, and cutting until the uterine arteries were dissected off  past the level of the cup on the left, and then the right side of the uterus. Colpotomy was then performed with a bipolar spatula and the cervix was circumferentially  along the groove of the Vcare device. Hemostasis was achieved with the gyrus. The uterus was removed from the vagina and sent to pathology. The vaginal cuff was closed with 4 figure-of-eight sutures of 0 vicryl vaginally, taking care to incorporate the peritoneum.  We then returned to the abdomen.  The abdomen and pelvis were re-insufflated with C02 and the pelvis was irrigated and suctioned.  Light bleeding at the vaginal cuff was controlled with electrocautery. The ovaries appeared normal.  Interceed was placed along the vaginal cuff.  The patient was taken out of trendelenburg position. Instruments were removed from the pelvis and the pneumoperitoneum was evacuated.  Skin edges were then closed with 4-0 Monocryl suture and steri strips and sterile dressings were applied.     The patient tolerated the procedure well. She was recalled from anesthesia without difficulty and was transported to PACU in a stable condition.  Dr. Queen was scrubbed and present for the entire duration of the procedure.  Due to the enlarged fibroid uterus, it was necessary for Dr Mayorga to assist with this procedure.     Clarisa Porter MD  PGY-3 OB/GYN    Physician Attestation   I was present for the entire procedure between opening and closing. I have reviewed and edited the above operative report to reflect the exact findings and details of the surgical procedure.    Daya Queen MD   April 10, 2019

## 2019-04-10 NOTE — ANESTHESIA PROCEDURE NOTES
Peripheral Nerve Block Procedure Note    Staff:     Anesthesiologist:  Curtis Gallardo MD    Referred By:  Daya Queen MD  Location: Pre-op  Procedure Start/Stop TImes:      4/10/2019 6:55 AM     4/10/2019 7:00 AM    patient identified, IV checked, site marked, risks and benefits discussed, informed consent, monitors and equipment checked, pre-op evaluation, at physician/surgeon's request and post-op pain management      Correct Patient: Yes      Correct Position: Yes      Correct Site: Yes      Correct Procedure: Yes      Correct Laterality:  Yes    Site Marked:  Yes  Procedure details:     Procedure:  TAP    ASA:  2    Laterality:  Bilateral    Position:  Supine    Sterile Prep: chloraprep, mask and sterile gloves      Local skin infiltration:  None    Needle:  Short bevel    Needle gauge:  21    Needle length (inches):  3.13    Ultrasound: Yes      Ultrasound used to identify targeted nerve, plexus, or vascular structure and placed a needle adjacent to it      Permanent Image entered into patiient's record      Abnormal pain on injection: No      Blood Aspirated: No      Paresthesias:  No    Bleeding at site: No      Bolus via:  Needle    Infusion Method:  Single Shot    Complications:  None

## 2019-04-10 NOTE — BRIEF OP NOTE
[facility], [place]    Brief Operative Note    Pre-operative diagnosis: Abnormal uterine bleeding, fibroids, pain  Post-operative diagnosis Abnormal uterine bleeding, fibroids, pain  Procedure: Total laparoscopic hysterectomy and bilateral salpingectomy  Surgeon: Surgeon(s) and Role:     * Daya Queen MD - Primary     * Natali Mayorga MD - Assisting     * Clarisa Porter MD - Resident - Assisting  Anesthesia: General   IVF 1800mL  Estimated blood loss: 50mL  Urine: 100mL  Drains:  Geronimo  Specimens:   ID Type Source Tests Collected by Time Destination   A :  Tissue Uterus, Cervix and Bilateral Fallopian Tubes SURGICAL PATHOLOGY EXAM Daya Queen MD 4/10/2019  9:44 AM      Findings:   Normal fallopian tubes.  Tiny subcentimeter paratubal cyst on left.  Uterus with small approximately 2 cm fundal fibroid.  The uterus otherwise appears normal.  The ovaries appear normal bilaterally.  Complications: None.  Implants: Interseed    Clarisa Porter MD  PGY-3 OB/GYN

## 2019-04-10 NOTE — ANESTHESIA PREPROCEDURE EVALUATION
Anesthesia Pre-Procedure Evaluation    Patient: Shanell Alfaro   MRN:     [id number] Gender:   female   Age:    33 year old :      1985        Preoperative Diagnosis: Fibroids Bleeding and Pain   Procedure(s):  TOTAL LAPAROSCOPIC HYSTERECTOMY WITH POSSIBLE SINGLE PORT     Past Medical History:   Diagnosis Date     Anxiety disorder 2015     Depressive disorder      Major depressive disorder, recurrent episode, mild (H) 2015      Past Surgical History:   Procedure Laterality Date     ENT SURGERY       NOSE SURGERY      deviated septum     WRIST SURGERY      torn ligament          Anesthesia Evaluation     . Pt has had prior anesthetic.            ROS/MED HX    ENT/Pulmonary:  - neg pulmonary ROS     Neurologic:  - neg neurologic ROS     Cardiovascular:  - neg cardiovascular ROS       METS/Exercise Tolerance:     Hematologic:  - neg hematologic  ROS       Musculoskeletal:  - neg musculoskeletal ROS       GI/Hepatic:  - neg GI/hepatic ROS       Renal/Genitourinary:  - ROS Renal section negative       Endo:  - neg endo ROS       Psychiatric:  - neg psychiatric ROS       Infectious Disease:         Malignancy:      - no malignancy   Other:    - neg other ROS                     PHYSICAL EXAM:   Mental Status/Neuro: A/A/O   Airway: Facies: Feasible  Mallampati: I  Mouth/Opening: Full  TM distance: > 6 cm  Neck ROM: Full   Respiratory: Auscultation: CTAB     Resp. Rate: Normal     Resp. Effort: Normal      CV: Rhythm: Regular  Rate: Age appropriate  Heart: Normal Sounds   Comments:      Dental: Normal                  Lab Results   Component Value Date    WBC 5.1 2019    HGB 13.9 2019    HCT 40.5 2019     2019    TSH 1.24 2019    HCG Negative 2019    HCGS Negative 2019       Preop Vitals  BP Readings from Last 3 Encounters:   04/10/19 119/84   19 115/78   19 121/76    Pulse Readings from Last 3 Encounters:   04/10/19 76   19 70  "  02/20/19 80      Resp Readings from Last 3 Encounters:   04/10/19 18   04/04/19 16   10/09/18 16    SpO2 Readings from Last 3 Encounters:   04/10/19 99%   04/04/19 100%   02/20/19 97%      Temp Readings from Last 1 Encounters:   04/10/19 36.6  C (97.9  F) (Oral)    Ht Readings from Last 1 Encounters:   04/10/19 1.708 m (5' 7.24\")      Wt Readings from Last 1 Encounters:   04/10/19 74.4 kg (164 lb 0.4 oz)    Estimated body mass index is 25.5 kg/m  as calculated from the following:    Height as of this encounter: 1.708 m (5' 7.24\").    Weight as of this encounter: 74.4 kg (164 lb 0.4 oz).     LDA:            Assessment:   ASA SCORE: 2    NPO Status: > 2 hours since completed Clear Liquids; > 6 hours since completed Solid Foods   Documentation: H&P complete; Preop Testing complete; Consents complete   Proceeding: Proceed without further delay  Tobacco Use:  NO Active use of Tobacco/UNKNOWN Tobacco use status     Plan:   Anes. Type:  General   Pre-Induction: Acetaminophen PO   Induction:  IV (Standard)   Airway: Oral ETT   Access/Monitoring: PIV   Maintenance: Balanced   Emergence: Procedure Site   Logistics: Same Day Surgery     Postop Pain/Sedation Strategy:  Standard-Options: Opioids PRN     PONV Management:  Adult Risk Factors: Female, Non-Smoker, Postop Opioids  Prevention: Ondansetron; Scop. Patch     CONSENT: Direct conversation   Plan and risks discussed with: Patient   Blood Products: Consented (ALL Blood Products)       Comments for Plan/Consent:  Discussed R/B of GA. Pt accepts plan. Questions answered.                         Millie Mendez MD  "

## 2019-04-17 ENCOUNTER — HOSPITAL ENCOUNTER (EMERGENCY)
Facility: CLINIC | Age: 34
Discharge: HOME | End: 2019-04-17
Attending: FAMILY MEDICINE | Admitting: FAMILY MEDICINE
Payer: COMMERCIAL

## 2019-04-17 ENCOUNTER — MYC MEDICAL ADVICE (OUTPATIENT)
Dept: OBGYN | Facility: CLINIC | Age: 34
End: 2019-04-17

## 2019-04-17 ENCOUNTER — NURSE TRIAGE (OUTPATIENT)
Dept: NURSING | Facility: CLINIC | Age: 34
End: 2019-04-17

## 2019-04-17 ENCOUNTER — TRANSFERRED RECORDS (OUTPATIENT)
Dept: HEALTH INFORMATION MANAGEMENT | Facility: CLINIC | Age: 34
End: 2019-04-17

## 2019-04-17 VITALS
OXYGEN SATURATION: 99 % | DIASTOLIC BLOOD PRESSURE: 80 MMHG | TEMPERATURE: 97.2 F | RESPIRATION RATE: 16 BRPM | HEART RATE: 88 BPM | WEIGHT: 164 LBS | SYSTOLIC BLOOD PRESSURE: 108 MMHG | BODY MASS INDEX: 25.5 KG/M2

## 2019-04-17 DIAGNOSIS — R10.84 ABDOMINAL PAIN, GENERALIZED: ICD-10-CM

## 2019-04-17 LAB
ALT SERPL-CCNC: 17 IU/L (ref 8–45)
AST SERPL-CCNC: 17 IU/L (ref 2–40)
CREAT SERPL-MCNC: 0.72 MG/DL (ref 0.57–1.11)
GFR SERPL CREATININE-BSD FRML MDRD: >60 ML/MIN/1.73M2
GLUCOSE SERPL-MCNC: 105 MG/DL (ref 65–100)
POTASSIUM SERPL-SCNC: 4.2 MMOL/L (ref 3.5–5)

## 2019-04-17 PROCEDURE — 25000128 H RX IP 250 OP 636: Performed by: FAMILY MEDICINE

## 2019-04-17 PROCEDURE — 99285 EMERGENCY DEPT VISIT HI MDM: CPT | Mod: Z6 | Performed by: FAMILY MEDICINE

## 2019-04-17 PROCEDURE — 99285 EMERGENCY DEPT VISIT HI MDM: CPT | Mod: 25

## 2019-04-17 RX ORDER — BISACODYL 10 MG
10 SUPPOSITORY, RECTAL RECTAL DAILY PRN
Qty: 10 SUPPOSITORY | Refills: 0 | Status: SHIPPED | OUTPATIENT
Start: 2019-04-17 | End: 2019-11-08

## 2019-04-17 RX ORDER — AMOXICILLIN 250 MG
2 CAPSULE ORAL 2 TIMES DAILY
Qty: 30 TABLET | Refills: 0 | Status: SHIPPED | OUTPATIENT
Start: 2019-04-17 | End: 2019-04-26

## 2019-04-17 RX ORDER — MORPHINE SULFATE 2 MG/ML
2 INJECTION, SOLUTION INTRAMUSCULAR; INTRAVENOUS ONCE
Status: COMPLETED | OUTPATIENT
Start: 2019-04-17 | End: 2019-04-17

## 2019-04-17 RX ADMIN — MORPHINE SULFATE 2 MG: 2 INJECTION, SOLUTION INTRAMUSCULAR; INTRAVENOUS at 12:15

## 2019-04-17 ASSESSMENT — ENCOUNTER SYMPTOMS
CHILLS: 0
NAUSEA: 0
FEVER: 0
APPETITE CHANGE: 0
SHORTNESS OF BREATH: 0
DIARRHEA: 0
ABDOMINAL PAIN: 1
VOMITING: 0
BLOOD IN STOOL: 0

## 2019-04-17 NOTE — TELEPHONE ENCOUNTER
Patient in ED for further eval at 1300 today.  Patient seen and will be discharged to home.   CTS reassuring for no acute postop complications. Patient with hard stool in her colon.  Likely colicky pain from slow bowel function. RR

## 2019-04-17 NOTE — ED PROVIDER NOTES
[facility] EMERGENCY DEPARTMENT ([facility])    4/17/19       History     Chief Complaint   Patient presents with     Post-op Problem     had a hysterectomy 1 week, seen at [facility] today and sent here for further evaluation     The history is provided by the patient, the spouse and medical records.     Shanell Alfaro is a 33 year old female with a medical history significant for fibroids and endometriosis who underwent a total laparoscopic hysterectomy with bilateral salpingectomy on 4/10/2019.  The patient reports that she was recovering well from the surgery.  On the morning of 4/15/2019 she began having a sharp epigastric abdominal pain.  She reports that the pain was intermittent and lasted for approximately 20 minutes before resolving.  The patient early this morning at approximately 3 AM had this pain returned.  She states that it has been intermittent and sharp, she reports that it is severe to the point that it is taking her breath away.  She denies any associated nausea, vomiting, diarrhea, fevers, chills, blood in her stool, chest pain or shortness of breath.  She reports that her last bowel movement was yesterday morning and was normal.  She states that she has been able to eat and drink fluids normally.  The patient presented to [facility] Emergency Department this morning for this pain.  The patient had a CT abdomen/pelvis done there and she was sent to this Emergency Department, in the hospital where she had the surgery done, due to the findings on the CT.  The patient reports that she was given morphine at [facility] and she denies any pain currently.    CT ABDOMEN PELVIS W (04/17/2019 8:55 AM)  Impression:     1. Status post hysterectomy.    2. There is significant edematous thickening of the wall of small bowel loops in the pelvis, in the midline as well as posteriorly to the right of midline. Haziness and stranding of the surrounding fat, and small amount of free fluid in the  pelvic cul-de-sac with some peripheral rim enhancement. Findings are suspicious for inflammatory process. Given recent surgery, it would be impossible to exclude infection. No specific features for ischemia such as pneumatosis or portal venous gas, but given the significant small bowel wall thickening, close correlation with clinical and laboratory findings is necessary. Some of the small bowel loops in the pelvis are somewhat clustered/matted. Collections of fluid and small foci of air are suspected to be within some of these loops, although it is difficult to completely exclude a small amount of extraluminal air, particularly along the right lateral margin of this process. If there are ongoing symptoms, a follow-up CT with IV and oral contrast may be useful in further evaluating.    3. As above, there is a thin rim of enhancement involving the small amount of fluid in the pelvic cul-de-sac region, which could be postoperative in etiology or a sign of peritonitis. There is a trace of additional fluid in the region of Morison's pouch, but there is no clearly defined abscess or free air elsewhere.    4. Moderate amount of stool through the colon. No evidence for obstruction.    5. See above for additional exam details.    I have reviewed the Medications, Allergies, Past Medical and Surgical History, and Social History in the Docin system.    Past Medical History:   Diagnosis Date     Anxiety disorder 6/9/2015     Depressive disorder      Major depressive disorder, recurrent episode, mild (H) 6/9/2015       Past Surgical History:   Procedure Laterality Date     ENT SURGERY       LAPAROSCOPIC HYSTERECTOMY TOTAL N/A 4/10/2019    Procedure: TOTAL LAPAROSCOPIC Vaginal HYSTERECTOMY;  Surgeon: Daya Queen MD;  Location: [facility] OR     NOSE SURGERY  2005    deviated septum     WRIST SURGERY  2007    torn ligament       Family History   Problem Relation Age of Onset     Depression Mother      Anxiety Disorder Mother       Heart Failure Father         mi     Diabetes Father      Hypertension Father      Hyperlipidemia Father      Breast Cancer Maternal Grandmother 70     Heart Failure Maternal Grandfather      Other Cancer Paternal Grandmother         lung     Heart Failure Paternal Grandfather      Depression Sister      Anxiety Disorder Sister      Depression Sister        Social History     Tobacco Use     Smoking status: Current Every Day Smoker     Packs/day: 0.50     Years: 5.00     Pack years: 2.50     Types: Cigarettes     Smokeless tobacco: Never Used   Substance Use Topics     Alcohol use: Yes     Alcohol/week: 1.0 oz     Comment: rare       No current facility-administered medications for this encounter.      Current Outpatient Medications   Medication     acetaminophen (TYLENOL) 325 MG tablet     bisacodyl (DULCOLAX) 10 MG suppository     ibuprofen (ADVIL/MOTRIN) 600 MG tablet     ondansetron (ZOFRAN-ODT) 4 MG ODT tab     senna-docusate (SENOKOT-S/PERICOLACE) 8.6-50 MG tablet        Allergies   Allergen Reactions     Erythromycin Nausea and Vomiting         Review of Systems   Constitutional: Negative for appetite change, chills and fever.   Respiratory: Negative for shortness of breath.    Cardiovascular: Negative for chest pain.   Gastrointestinal: Positive for abdominal pain (epigastric; intermittent). Negative for blood in stool, diarrhea, nausea and vomiting.   All other systems reviewed and are negative.      Physical Exam   BP: 110/77  Heart Rate: 90  Temp: 98  F (36.7  C)  Resp: 18  Weight: 74.4 kg (164 lb)  SpO2: 99 %      Physical Exam   Constitutional: No distress.   HENT:   Head: Atraumatic.   Mouth/Throat: Oropharynx is clear and moist. No oropharyngeal exudate.   Eyes: Pupils are equal, round, and reactive to light. No scleral icterus.   Cardiovascular: Normal heart sounds and intact distal pulses.   Pulmonary/Chest: Breath sounds normal. No respiratory distress.   Abdominal: Soft. Bowel sounds are normal.  There is tenderness in the right upper quadrant and left lower quadrant. There is no rigidity, no rebound and no guarding.   Musculoskeletal: She exhibits no edema or tenderness.   Skin: Skin is warm. No rash noted. She is not diaphoretic.       ED Course   11:35 AM  The patient was seen and examined by David Clark MD in Room ED02.        Procedures             Critical Care time:  none             Labs Ordered and Resulted from Time of ED Arrival Up to the Time of Departure from the ED - No data to display         Assessments & Plan (with Medical Decision Making)   Patient had a hysterectomy and BSO on April 10, presented to an outside hospital today due to crampy abdominal pain.  Workup there revealed normal labs but some concerning findings on abdominal CT scan.  The patient was transferred to our care here so that she could be seen in consultation by her surgeon, serial vitals serial exam.  In our ED, her vital signs are unremarkable.  She is in only mild distress.  She has normal bowel sounds and a non-distended abdomen.  she has tenderness but no peritoneal signs.  The patient's OB/GYN surgeon and her team came to the emergency department, evaluated the patient, reviewed the imaging with our radiologist.  Please refer to their documentation.  Most of the findings on CT scan are attributed to expected postoperative changes.  The patient is feeling much better and based on her clinical course and her diagnostic studies OB has recommended discharge on a good bowel regimen and close outpatient follow-up.  The patient is in agreement and understands the indications for return.    I have reviewed the nursing notes.    I have reviewed the findings, diagnosis, plan and need for follow up with the patient.       Medication List      Started    bisacodyl 10 MG suppository  Commonly known as:  DULCOLAX  10 mg, Rectal, DAILY PRN     senna-docusate 8.6-50 MG tablet  Commonly known as:  SENOKOT-S/PERICOLACE  2 tablets,  Oral, 2 TIMES DAILY            Final diagnoses:   Abdominal pain, generalized     I, Cem Dean, am serving as a trained medical scribe to document services personally performed by David Clark MD, based on the provider's statements to me.   I, David Clark MD, was physically present and have reviewed and verified the accuracy of this note documented by Cem Dean.    4/17/2019   [facility], [place], EMERGENCY DEPARTMENT     David Clark MD  04/17/19 4050

## 2019-04-17 NOTE — DISCHARGE INSTRUCTIONS
Thank you for choosing [facility].     Please closely monitor for further symptoms. Return to the Emergency Department if you develop any new or worsening signs or symptoms.    If you received any opiate pain medications or sedatives during your visit, please do not drive for at least 8 hours.     Labs, cultures or final xray interpretations may still need to be reviewed.  We will call you if your plan of care needs to be changed.    Please follow up with Dr. Queen as discussed.

## 2019-04-17 NOTE — ED AVS SNAPSHOT
[facility], [place], Emergency Department  [address]  [facility] [address]  Phone:  [phone]  Fax:  [phone]                                    Shanell Alfaro   MRN: [id number]    Department:  [facility], Emergency Department   Date of Visit:  4/17/2019           After Visit Summary Signature Page    I have received my discharge instructions, and my questions have been answered. I have discussed any challenges I see with this plan with the nurse or doctor.    ..........................................................................................................................................  Patient/Patient Representative Signature      ..........................................................................................................................................  Patient Representative Print Name and Relationship to Patient    ..................................................               ................................................  Date                                   Time    ..........................................................................................................................................  Reviewed by Signature/Title    ...................................................              ..............................................  Date                                               Time          22EPIC Rev 08/18

## 2019-04-17 NOTE — TELEPHONE ENCOUNTER
"4-10-19 had hysterectomy. Two days ago started with abdominal \"stomach\" pains.  Liberty An RN-[facility]      Reason for Disposition    [1] SEVERE pain (e.g., excruciating) AND [2] present > 1 hour     Pain at 8    Additional Information    Negative: Severe difficulty breathing (e.g., struggling for each breath, speaks in single words)    Negative: Shock suspected (e.g., cold/pale/clammy skin, too weak to stand, low BP, rapid pulse)    Negative: Difficult to awaken or acting confused  (e.g., disoriented, slurred speech)    Negative: Passed out (i.e., lost consciousness, collapsed and was not responding)    Negative: Visible sweat on face or sweat dripping down face    Negative: Sounds like a life-threatening emergency to the triager    Negative: Followed an abdomen (stomach) injury    Negative: Chest pain    Protocols used: ABDOMINAL PAIN - UPPER-ADULT-AH      "

## 2019-04-18 NOTE — ED PROVIDER NOTES
Emergency Department Gynecology Consultation    Visit Date:   04/17/2019      HISTORY OF PRESENT ILLNESS:  Shanell Burns is a 33-year-old female who is postop day 7 from a total laparoscopic hysterectomy and bilateral salpingectomy who presents to the Emergency Department for further evaluation.  She was seen in the [facility] early this morning for acute onset of sharp abdominal pain.  An evaluation in the Emergency Department there revealed no evidence of infection and normal labs.  A CT scan was performed which revealed a moderate amount of stool throughout the colon, no evidence for bowel perforation, and edematous thickening of the small bowel loops in the pelvis in the area of the vaginal cuff.  There was no evidence of kidney, ureteral or bladder injury.  The Emergency Department physician contacted the surgeon and the surgeon requested that the patient come down to the [facility] [facility] Department for further evaluation.      Shanell reports doing well after surgery until approximately 1 to 2 days prior.  She developed intermittent sharp epigastric and right lower quadrant abdominal pain.  The pain was colicky and intermittent.  It would last for episodes of approximately 20 minutes before relief.  At approximately 3:00 a.m. on the morning of her arrival to the Emergency Department, she had a significant episode of pain which doubled her over.  She denies any fevers, chills, sweats, diarrhea or vomiting.  She has been having bowel movements; however, they have been extremely hard.  She has had a normal diet and has taken only 2 oxycodone pills since discharge from the hospital.      PAST MEDICAL HISTORY:  Anxiety and depression.      PAST SURGICAL HISTORY:  ENT Surgery.  Nose surgery, wrist surgery, and most recently laparoscopic hysterectomy.      SOCIAL HISTORY:  She is in a relationship with her wife.      MEDICATIONS:  Reviewed as documented in her database.      MEDICATIONS:   Include    1.  Tylenol.   2.  Ibuprofen.      ALLERGIES:  ERYTHROMYCIN.      REVIEW IN SYSTEMS:  As noted above.      PHYSICAL EXAMINATION:     VITAL SIGNS:  In the [facility] Department, she was afebrile with stable vital signs.   GENERAL:  She appeared in no acute distress.  Affect was appropriate.   CARDIOVASCULAR/RESPIRATORY:  Normal respiratory and cardiovascular effort.    ABDOMEN:  Soft and nondistended.  It is mildly tender to palpation in the mid epigastric region, right upper quadrant, and mild tenderness in the left lower quadrant.  There was no rebound or guarding, and normal bowel sounds were present.     BILATERAL LOWER EXTREMITIES:  No edema.      IMAGING:  CT scan images were reviewed with the Radiologist in the [facility] and confirmed findings as read out by the [facility] team.  There were some loops of small bowel, wall thickening.  Overall, postoperative inflammatory changes were noted; however, no evidence of bowel perforation, bladder or ureteral injury, and moderate stool was noted in the colon.      ASSESSMENT AND PLAN:  Postoperative day 7 with no acute postsurgical complications.  Discussed with the patient that likely she is experiencing pain from her hard stool moving through the colon.  We discussed a bowel regimen to help her with softening her stools and passing them.  She was given a prescription for Dulcolax suppositories and Danielle-Colace.  We discussed the use of Milk of Magnesia and MiraLax as well.  Increasing dietary fiber was also recommended.  She was discharged to home and will follow up in the office within a couple weeks.  She will call sooner with any further concerns.         ERIN ENGLISH MD             D: 2019   T: 2019   MT:       Name:     PIA LIRA   MRN:      [id number]        Account:      [id number]   :      1985           Visit Date:   2019      Document: [id number]

## 2019-04-19 LAB — COPATH REPORT: NORMAL

## 2019-04-25 NOTE — PROGRESS NOTES
SUBJECTIVE:   Shanell Alfaro is a 33 year old female who presents to clinic today for the following   health issues:      Patient needs a release to go back to work. Patient has brought a form to be completed.  Patient would like to go back to work as she was offered a promotion.  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  May need to lift up to 25 lbs, but will take it easy initially.       Additional history: as documented    Reviewed  and updated as needed this visit by clinical staff  Tobacco  Allergies  Meds  Problems  Med Hx  Surg Hx  Fam Hx  Soc Hx          Reviewed and updated as needed this visit by Provider  Tobacco  Allergies  Meds  Problems  Med Hx  Surg Hx  Fam Hx         Patient Active Problem List   Diagnosis     CARDIOVASCULAR SCREENING; LDL GOAL LESS THAN 160     Tobacco use disorder     Endometriosis     Uterine leiomyoma, unspecified location     Adjustment disorder with mixed anxiety and depressed mood     History of partial hysterectomy     Past Surgical History:   Procedure Laterality Date     ENT SURGERY       LAPAROSCOPIC HYSTERECTOMY TOTAL N/A 4/10/2019    Procedure: TOTAL LAPAROSCOPIC Vaginal HYSTERECTOMY;  Surgeon: Daya Queen MD;  Location: [facility]     NOSE SURGERY  2005    deviated septum     WRIST SURGERY  2007    torn ligament       Social History     Tobacco Use     Smoking status: Current Every Day Smoker     Packs/day: 0.50     Years: 5.00     Pack years: 2.50     Types: Cigarettes     Smokeless tobacco: Never Used   Substance Use Topics     Alcohol use: Yes     Alcohol/week: 1.0 oz     Comment: rare     Family History   Problem Relation Age of Onset     Depression Mother      Anxiety Disorder Mother      Heart Failure Father         mi     Diabetes Father      Hypertension Father      Hyperlipidemia Father      Breast Cancer Maternal Grandmother 70     Heart Failure Maternal Grandfather      Other Cancer Paternal Grandmother         lung      Heart Failure Paternal Grandfather      Depression Sister      Anxiety Disorder Sister      Depression Sister          Current Outpatient Medications   Medication Sig Dispense Refill     bisacodyl (DULCOLAX) 10 MG suppository Place 1 suppository (10 mg) rectally daily as needed for constipation 10 suppository 0     ibuprofen (ADVIL/MOTRIN) 600 MG tablet Take 1 tablet (600 mg) by mouth every 6 hours as needed for moderate pain 30 tablet 0     Allergies   Allergen Reactions     Erythromycin Nausea and Vomiting     Recent Labs   Lab Test 01/22/19  1224 03/27/18  1421 06/09/15  1030   A1C  --  5.0  --    LDL  --  136* 77   HDL  --  57 52   TRIG  --  133 72   TSH 1.24 1.33  --       BP Readings from Last 3 Encounters:   04/26/19 114/78   04/17/19 108/80   04/10/19 118/76    Wt Readings from Last 3 Encounters:   04/26/19 74.8 kg (165 lb)   04/17/19 74.4 kg (164 lb)   04/10/19 74.4 kg (164 lb 0.4 oz)                  Labs reviewed in EPIC    ROS:  Constitutional, HEENT, cardiovascular, pulmonary, GI, , musculoskeletal, neuro, skin, endocrine and psych systems are negative, except as otherwise noted.    OBJECTIVE:     /78   Pulse 80   Temp 98.1  F (36.7  C) (Oral)   Resp 16   Wt 74.8 kg (165 lb)   LMP 03/09/2019   SpO2 98%   BMI 25.66 kg/m    Body mass index is 25.66 kg/m .  GENERAL: healthy, alert and no distress  RESP: lungs clear to auscultation - no rales, rhonchi or wheezes  CV: regular rate and rhythm, normal S1 S2, no S3 or S4, no murmur, click or rub, no peripheral edema and peripheral pulses strong  ABDOMEN: soft, nontender, no hepatosplenomegaly, no masses and bowel sounds normal, no CVA tenderness  SKIN: POSITIVE 3 incisions from laparoscopic hysterectomy are healing well. No erythema, edema, drainage or warmth.   PSYCH: mentation appears normal, affect normal/bright    Diagnostic Test Results:  See orders    ASSESSMENT/PLAN:         ICD-10-CM    1. Hospital discharge follow-up Z09    2.  History of partial hysterectomy Z90.711    3. Return to work evaluation Z76.89        See Patient Instructions: As discussed, you can return to work.  Please use judgment on appropriate lifting and physical activity.  If you have stomach pain, please reduce activity.  If you have any healthcare concerns or questions please let me know    SE Zabala  [facility]

## 2019-04-26 ENCOUNTER — OFFICE VISIT (OUTPATIENT)
Dept: FAMILY MEDICINE | Facility: CLINIC | Age: 34
End: 2019-04-26
Payer: COMMERCIAL

## 2019-04-26 VITALS
HEART RATE: 80 BPM | RESPIRATION RATE: 16 BRPM | TEMPERATURE: 98.1 F | OXYGEN SATURATION: 98 % | BODY MASS INDEX: 25.66 KG/M2 | WEIGHT: 165 LBS | SYSTOLIC BLOOD PRESSURE: 114 MMHG | DIASTOLIC BLOOD PRESSURE: 78 MMHG

## 2019-04-26 DIAGNOSIS — Z76.89 RETURN TO WORK EVALUATION: ICD-10-CM

## 2019-04-26 DIAGNOSIS — Z90.711 HISTORY OF PARTIAL HYSTERECTOMY: ICD-10-CM

## 2019-04-26 DIAGNOSIS — Z09 HOSPITAL DISCHARGE FOLLOW-UP: Primary | ICD-10-CM

## 2019-04-26 PROCEDURE — 99213 OFFICE O/P EST LOW 20 MIN: CPT | Performed by: NURSE PRACTITIONER

## 2019-04-26 NOTE — PATIENT INSTRUCTIONS
As discussed, you can return to work.  Please use judgment on appropriate lifting and physical activity.  If you have stomach pain, please reduce activity.  If you have any healthcare concerns or questions please let me know      Patient Education     Laparoscopic Hysterectomy: Your Home Recovery  When you leave the hospital, you ll receive instructions on caring for yourself at home. Following these instructions helps ensure a faster recovery. It often takes about 1 week to 4 weeks to recover from laparoscopic hysterectomy. But recovery time varies from woman to woman.    Taking care of yourself  Follow these tips to make your recovery as safe and comfortable as possible:    To avoid constipation, eat fruits, vegetables, and whole grains. Drink plenty of water. Your healthcare provider may suggest that you use a laxative or a mild stool softener.    Ask your friends and family to help with chores and errands while you recover.    Do not lift anything over 10 pounds to avoid straining your incisions.    Do not get your incisions wet until your healthcare provider says it s OK to do so.    Do not put anything in the vagina until your provider says it s safe to do so. This includes using tampons and douches and having sexual intercourse.    Schedule follow-up visits with your healthcare provider.  When to call your healthcare provider  Call your healthcare provider if you notice any of these:    Chills or a fever of 100.4 F (38 C) or higher, or as advised    Bright red vaginal bleeding or a smelly discharge    Trouble urinating or burning during urination    Severe abdominal pain or bloating    A red, swollen, or draining smelly fluid from the incision sites    Trouble breathing or fainting    Swollen painful leg   Date Last Reviewed: 3/1/2017    2755-5776 [employer]. [address] [place] [address], [place] [address]. All rights reserved. This information is not intended as a substitute for professional medical  care. Always follow your healthcare professional's instructions.           Patient Education     Caring for Yourself After Hysterectomy     Staying active can help you feel better in mind and body.   After you recover from your hysterectomy, you may feel better than you have in a long time. An active, healthy lifestyle and regular medical care can help you continue to feel good.  Being intimate  After a hysterectomy, sex can be as pleasurable as it was before. Follow your surgeon s instructions on when you can resume having intercourse. This is usually within 4 to 8 weeks after the procedure. Other types of sexual activity may be possible sooner. If you experience vaginal dryness during sex, use a lubricant. Be aware that a hysterectomy prevents pregnancy, but does not protect you against sexually transmitted diseases. If you have any concerns, discuss them with your partner and your healthcare provider.  Being aware of your emotions  After a hysterectomy, you may feel relieved to be free of symptoms. You may also feel sad about the changes in your body. If your ovaries were removed, you may go through some natural mood swings as your hormones adjust. Note how you are feeling from day to day. Talk to your healthcare provider if you re concerned about emotions you are feeling.  Ongoing healthcare  Regular physical exams help to ensure your general health and well-being:    You will continue to need routine breast exams and pelvic exams. This includes Pap tests if you still have a cervix or if you have a history of certain types of dysplasia or cervical cancer.     If you re taking hormone therapy, you will need follow-up visits with your healthcare provider to fine-tune your dosage.  What to know about hormone therapy  Hormone therapy (HT) is medicine to replace the hormones made by your ovaries. It may be advised if your ovaries are removed and you have not yet gone through natural menopause. HT helps decrease hot  flashes, vaginal dryness, and other symptoms of menopause. It may help reduce bone loss and lessen your risk of developing osteoporosis. But HT may also increase the risk of certain types of health problems in some women. Discuss the pros and cons of HT with your healthcare provider.   Date Last Reviewed: 3/1/2017    1135-0496 [employer]. [address] [place] [address], [place] [address]. All rights reserved. This information is not intended as a substitute for professional medical care. Always follow your healthcare professional's instructions.

## 2019-05-14 ENCOUNTER — OFFICE VISIT (OUTPATIENT)
Dept: OBGYN | Facility: CLINIC | Age: 34
End: 2019-05-14
Payer: COMMERCIAL

## 2019-05-14 VITALS
DIASTOLIC BLOOD PRESSURE: 76 MMHG | OXYGEN SATURATION: 99 % | SYSTOLIC BLOOD PRESSURE: 120 MMHG | HEART RATE: 80 BPM | BODY MASS INDEX: 25.68 KG/M2 | WEIGHT: 163.6 LBS | TEMPERATURE: 98.2 F | HEIGHT: 67 IN

## 2019-05-14 DIAGNOSIS — B96.89 BV (BACTERIAL VAGINOSIS): Primary | ICD-10-CM

## 2019-05-14 DIAGNOSIS — N76.0 BV (BACTERIAL VAGINOSIS): Primary | ICD-10-CM

## 2019-05-14 DIAGNOSIS — Z09 POSTOP CHECK: ICD-10-CM

## 2019-05-14 LAB
SPECIMEN SOURCE: ABNORMAL
WET PREP SPEC: ABNORMAL

## 2019-05-14 PROCEDURE — 99024 POSTOP FOLLOW-UP VISIT: CPT | Performed by: OBSTETRICS & GYNECOLOGY

## 2019-05-14 PROCEDURE — 87210 SMEAR WET MOUNT SALINE/INK: CPT | Performed by: OBSTETRICS & GYNECOLOGY

## 2019-05-14 RX ORDER — METRONIDAZOLE 500 MG/1
500 TABLET ORAL 2 TIMES DAILY
Qty: 14 TABLET | Refills: 0 | Status: SHIPPED | OUTPATIENT
Start: 2019-05-14 | End: 2019-11-08

## 2019-05-14 ASSESSMENT — MIFFLIN-ST. JEOR: SCORE: 1478.52

## 2019-05-14 NOTE — PROGRESS NOTES
"Chief Complaint   Patient presents with     Surgical Followup       Initial /76 (BP Location: Right arm, Patient Position: Sitting, Cuff Size: Adult Regular)   Pulse 80   Temp 98.2  F (36.8  C) (Oral)   Ht 1.708 m (5' 7.24\")   Wt 74.2 kg (163 lb 9.6 oz)   LMP 2019   SpO2 99%   BMI 25.44 kg/m   Estimated body mass index is 25.44 kg/m  as calculated from the following:    Height as of this encounter: 1.708 m (5' 7.24\").    Weight as of this encounter: 74.2 kg (163 lb 9.6 oz).  BP completed using cuff size: regular    Questioned patient about current smoking habits.  Pt. currently smokes.  Advised about smoking cessation.          The following HM Due: NONE      The following patient reported/Care Every where data was sent to:  P ABSTRACT QUALITY INITIATIVES [01765]  n/a      n/a and patient has appointment for today     Shanell Alfaro is a 33 year old female who presents for a postop visit today and to follow up on her recent hysterectomy.  She underwent a TLH and BS 4 wks ago. The surgery went well.  The final path revealed benign.  We reviewed the path today.  Since surgery she has been doing well except for had light spotting intermittently .up until one week ago.  Other than that, she is feeling well.      Her current medications are rare need for ibuprofen.  She is back at work.     Lab Results   Component Value Date    PAP NIL 2018    PAP NIL 2015        OBJECTIVE:  Vitals:    19 0915   BP: 120/76   BP Location: Right arm   Patient Position: Sitting   Cuff Size: Adult Regular   Pulse: 80   Temp: 98.2  F (36.8  C)   TempSrc: Oral   SpO2: 99%   Weight: 74.2 kg (163 lb 9.6 oz)   Height: 1.708 m (5' 7.24\")      She appears well, in no apparent distress.  Alert and oriented times three, pleasant and cooperative.    The abdomen is soft without tenderness, guarding, mass, rebound or organomegaly.   Incision well healed      Pelvic:  EG - normal adult female.  BUS - " within normal limits.  Vagina - well rugated, moderate white discharge. Stitches still noted  No pain at the cuff.   Cervix and uterus absent.  Adnexae - no masses or tenderness.  RV - deferred.    Results for orders placed or performed in visit on 05/14/19   Wet prep   Result Value Ref Range    Specimen Description Vagina     Wet Prep No Trichomonas seen     Wet Prep Many  Clue cells seen   (A)     Wet Prep Few  WBC'S seen       Wet Prep No yeast seen           ASSESSMENT:  Encounter Diagnoses   Name Primary?     BV (bacterial vaginosis) Yes     Postop check     ongoing spotting probably related to bacterial vaginosis     PLAN:  rx for metronidazole.   discussed postop restrictions and follow up.   No  Longer needs pap smears.      Daya Queen MD

## 2019-09-15 ENCOUNTER — TRANSFERRED RECORDS (OUTPATIENT)
Dept: HEALTH INFORMATION MANAGEMENT | Facility: CLINIC | Age: 34
End: 2019-09-15

## 2019-11-05 NOTE — PROGRESS NOTES
"   SUBJECTIVE:   CC: Shanell Alfaro is an 34 year old woman who presents for preventive health visit.     Healthy Habits:    Do you get at least three servings of calcium containing foods daily (dairy, green leafy vegetables, etc.)? { :124311::\"yes\"}    Amount of exercise or daily activities, outside of work: { :939328}    Problems taking medications regularly { :564455::\"No\"}    Medication side effects: { :323891::\"No\"}    Have you had an eye exam in the past two years? { :617756}    Do you see a dentist twice per year? { :249750}    Do you have sleep apnea, excessive snoring or daytime drowsiness?{ :915786}  {Outside tests to abstract? :430073}    {additional problems to add (Optional):710685}    Today's PHQ-2 Score:   PHQ-2 ( 1999 [employer]) 4/4/2019 6/25/2018   Q1: Little interest or pleasure in doing things 0 0   Q2: Feeling down, depressed or hopeless 0 0   PHQ-2 Score 0 0   Q1: Little interest or pleasure in doing things - -   Q2: Feeling down, depressed or hopeless - -   PHQ-2 Score - -     {PHQ-2 LOOK IN ASSESSMENTS (Optional) :947446}  Abuse: Current or Past(Physical, Sexual or Emotional)- {YES/NO/NA:067964}  Do you feel safe in your environment? {YES/NO/NA:109062}        Social History     Tobacco Use     Smoking status: Current Every Day Smoker     Packs/day: 0.50     Years: 5.00     Pack years: 2.50     Types: Cigarettes     Smokeless tobacco: Never Used   Substance Use Topics     Alcohol use: Yes     Alcohol/week: 1.7 standard drinks     Comment: rare     If you drink alcohol do you typically have >3 drinks per day or >7 drinks per week? {ETOH :391397}                     Reviewed orders with patient.  Reviewed health maintenance and updated orders accordingly - {Yes/No:100859::\"Yes\"}  {Chronicprobdata (Optional):252911}    {Mammo Decision Support (Optional):957648}    Pertinent mammograms are reviewed under the imaging tab.  History of abnormal Pap smear: {PAP HX:181945}  PAP / HPV Latest Ref Rng & " "Units 3/27/2018 6/9/2015   PAP - NIL NIL   HPV 16 DNA NEG:Negative Negative Negative   HPV 18 DNA NEG:Negative Negative Negative   OTHER HR HPV NEG:Negative Negative Negative     Reviewed and updated as needed this visit by clinical staff         Reviewed and updated as needed this visit by Provider        {HISTORY OPTIONS (Optional):360982}    ROS:  { :212928}    OBJECTIVE:   LMP 03/09/2019   EXAM:  {Exam Choices:631889}    {Diagnostic Test Results (Optional):385323::\"Diagnostic Test Results:\",\"Labs reviewed in Epic\"}    ASSESSMENT/PLAN:   {Diag Picklist:362232}    COUNSELING:   {FEMALE COUNSELING MESSAGES:546876::\"Reviewed preventive health counseling, as reflected in patient instructions\"}    Estimated body mass index is 25.44 kg/m  as calculated from the following:    Height as of 5/14/19: 1.708 m (5' 7.24\").    Weight as of 5/14/19: 74.2 kg (163 lb 9.6 oz).    {Weight Management Plan (ACO) Complete if BMI is abnormal-  Ages 18-64  BMI >24.9.  Age 65+ with BMI <23 or >30 (Optional):432434}     reports that she has been smoking cigarettes. She has a 2.50 pack-year smoking history. She has never used smokeless tobacco.  {Tobacco Cessation -- Complete if patient is a smoker (Optional):634792}    Counseling Resources:  ATP IV Guidelines  Pooled Cohorts Equation Calculator  Breast Cancer Risk Calculator  FRAX Risk Assessment  ICSI Preventive Guidelines  Dietary Guidelines for Americans, 2010  USDA's MyPlate  ASA Prophylaxis  Lung CA Screening    Lisette Plata NP  [facility]  "

## 2019-11-07 ENCOUNTER — HEALTH MAINTENANCE LETTER (OUTPATIENT)
Age: 34
End: 2019-11-07

## 2019-11-08 ENCOUNTER — OFFICE VISIT (OUTPATIENT)
Dept: FAMILY MEDICINE | Facility: CLINIC | Age: 34
End: 2019-11-08
Payer: COMMERCIAL

## 2019-11-08 VITALS
RESPIRATION RATE: 16 BRPM | WEIGHT: 169.2 LBS | TEMPERATURE: 98 F | BODY MASS INDEX: 26.56 KG/M2 | DIASTOLIC BLOOD PRESSURE: 75 MMHG | HEIGHT: 67 IN | HEART RATE: 91 BPM | OXYGEN SATURATION: 99 % | SYSTOLIC BLOOD PRESSURE: 120 MMHG

## 2019-11-08 DIAGNOSIS — Z00.00 ROUTINE GENERAL MEDICAL EXAMINATION AT A HEALTH CARE FACILITY: Primary | ICD-10-CM

## 2019-11-08 DIAGNOSIS — D17.30 LIPOMA OF SKIN AND SUBCUTANEOUS TISSUE: ICD-10-CM

## 2019-11-08 DIAGNOSIS — Z13.220 LIPID SCREENING: ICD-10-CM

## 2019-11-08 DIAGNOSIS — Z12.31 ENCOUNTER FOR SCREENING MAMMOGRAM FOR BREAST CANCER: ICD-10-CM

## 2019-11-08 DIAGNOSIS — Z13.29 SCREENING FOR THYROID DISORDER: ICD-10-CM

## 2019-11-08 DIAGNOSIS — Z13.1 SCREENING FOR DIABETES MELLITUS: ICD-10-CM

## 2019-11-08 DIAGNOSIS — Z87.898 HX OF BREAST LUMP: ICD-10-CM

## 2019-11-08 DIAGNOSIS — M54.9 BACK PAIN, UNSPECIFIED BACK LOCATION, UNSPECIFIED BACK PAIN LATERALITY, UNSPECIFIED CHRONICITY: ICD-10-CM

## 2019-11-08 LAB
CHOLEST SERPL-MCNC: 185 MG/DL
GLUCOSE SERPL-MCNC: 94 MG/DL (ref 70–99)
HDLC SERPL-MCNC: 46 MG/DL
LDLC SERPL CALC-MCNC: 124 MG/DL
NONHDLC SERPL-MCNC: 139 MG/DL
TRIGL SERPL-MCNC: 77 MG/DL
TSH SERPL DL<=0.005 MIU/L-ACNC: 1.19 MU/L (ref 0.4–4)

## 2019-11-08 PROCEDURE — 99213 OFFICE O/P EST LOW 20 MIN: CPT | Mod: 25 | Performed by: NURSE PRACTITIONER

## 2019-11-08 PROCEDURE — 82947 ASSAY GLUCOSE BLOOD QUANT: CPT | Performed by: NURSE PRACTITIONER

## 2019-11-08 PROCEDURE — 36415 COLL VENOUS BLD VENIPUNCTURE: CPT | Performed by: NURSE PRACTITIONER

## 2019-11-08 PROCEDURE — 90686 IIV4 VACC NO PRSV 0.5 ML IM: CPT | Performed by: NURSE PRACTITIONER

## 2019-11-08 PROCEDURE — 84443 ASSAY THYROID STIM HORMONE: CPT | Performed by: NURSE PRACTITIONER

## 2019-11-08 PROCEDURE — 99395 PREV VISIT EST AGE 18-39: CPT | Mod: 25 | Performed by: NURSE PRACTITIONER

## 2019-11-08 PROCEDURE — 80061 LIPID PANEL: CPT | Performed by: NURSE PRACTITIONER

## 2019-11-08 PROCEDURE — 90471 IMMUNIZATION ADMIN: CPT | Performed by: NURSE PRACTITIONER

## 2019-11-08 ASSESSMENT — ENCOUNTER SYMPTOMS
HEMATURIA: 0
DYSURIA: 0
CHILLS: 0
BREAST MASS: 1
DIARRHEA: 0
FREQUENCY: 0
CONSTIPATION: 0
SORE THROAT: 0
SHORTNESS OF BREATH: 0
NAUSEA: 0
HEADACHES: 0
ABDOMINAL PAIN: 0
MYALGIAS: 0
DIZZINESS: 0
HEARTBURN: 0
JOINT SWELLING: 0
PALPITATIONS: 0
EYE PAIN: 0
WEAKNESS: 0
FEVER: 0
NERVOUS/ANXIOUS: 0
ARTHRALGIAS: 0
PARESTHESIAS: 0
HEMATOCHEZIA: 0
COUGH: 1

## 2019-11-08 ASSESSMENT — ANXIETY QUESTIONNAIRES
6. BECOMING EASILY ANNOYED OR IRRITABLE: SEVERAL DAYS
2. NOT BEING ABLE TO STOP OR CONTROL WORRYING: SEVERAL DAYS
1. FEELING NERVOUS, ANXIOUS, OR ON EDGE: SEVERAL DAYS
3. WORRYING TOO MUCH ABOUT DIFFERENT THINGS: SEVERAL DAYS
5. BEING SO RESTLESS THAT IT IS HARD TO SIT STILL: NOT AT ALL
GAD7 TOTAL SCORE: 5
7. FEELING AFRAID AS IF SOMETHING AWFUL MIGHT HAPPEN: SEVERAL DAYS

## 2019-11-08 ASSESSMENT — PATIENT HEALTH QUESTIONNAIRE - PHQ9
SUM OF ALL RESPONSES TO PHQ QUESTIONS 1-9: 1
5. POOR APPETITE OR OVEREATING: NOT AT ALL

## 2019-11-08 ASSESSMENT — MIFFLIN-ST. JEOR: SCORE: 1492.43

## 2019-11-08 NOTE — RESULT ENCOUNTER NOTE
Justino Reece,    Thank you for your recent office visit.    Here are your recent results.  Normal blood labs.     Feel free to contact me via [employer] or call the clinic at [phone].    Sincerely,    LESLY Mendes, FNP-BC

## 2019-11-08 NOTE — PROGRESS NOTES
SUBJECTIVE:   CC: Shanell Alfaro is an 34 year old woman who presents for preventive health visit.     Healthy Habits:     Getting at least 3 servings of Calcium per day:  NO    Bi-annual eye exam:  NO    Dental care twice a year:  NO    Sleep apnea or symptoms of sleep apnea:  Daytime drowsiness    Diet:  Regular (no restrictions)    Frequency of exercise:  None    Taking medications regularly:  Yes    Medication side effects:  Not applicable    PHQ-2 Total Score: 0    Additional concerns today:  No      Patient/Parent of patient informed that anything we discuss that is not related to preventative medicine, may be billed for; patient verbalizes understanding.    Concern(s):  1. Lump on neck  2. Chiropractor referral- back pain  3. Breast mass size- repeat mammogram       Today's PHQ-2 Score:   PHQ-2 ( 1999 Pfizer) 11/8/2019   Q1: Little interest or pleasure in doing things 0   Q2: Feeling down, depressed or hopeless 0   PHQ-2 Score 0   Q1: Little interest or pleasure in doing things Not at all   Q2: Feeling down, depressed or hopeless Not at all   PHQ-2 Score 0       Abuse: Current or Past(Physical, Sexual or Emotional)- No  Do you feel safe in your environment? Yes        Social History     Tobacco Use     Smoking status: Current Every Day Smoker     Packs/day: 0.50     Years: 5.00     Pack years: 2.50     Types: Cigarettes     Smokeless tobacco: Never Used   Substance Use Topics     Alcohol use: Yes     Alcohol/week: 1.7 standard drinks     Comment: rare     If you drink alcohol do you typically have >3 drinks per day or >7 drinks per week? No    Alcohol Use 11/8/2019   Prescreen: >3 drinks/day or >7 drinks/week? No   Prescreen: >3 drinks/day or >7 drinks/week? -       Reviewed orders with patient.  Reviewed health maintenance and updated orders accordingly - Yes  Lab work is in process  Labs reviewed in EPIC  BP Readings from Last 3 Encounters:   11/08/19 120/75   05/14/19 120/76   04/26/19 114/78    Wt  Readings from Last 3 Encounters:   11/08/19 76.7 kg (169 lb 3.2 oz)   05/14/19 74.2 kg (163 lb 9.6 oz)   04/26/19 74.8 kg (165 lb)                  Patient Active Problem List   Diagnosis     CARDIOVASCULAR SCREENING; LDL GOAL LESS THAN 160     Tobacco use disorder     Endometriosis     Uterine leiomyoma, unspecified location     Adjustment disorder with mixed anxiety and depressed mood     History of partial hysterectomy     Lipoma of skin and subcutaneous tissue     Hx of breast lump     Past Surgical History:   Procedure Laterality Date     ENT SURGERY       LAPAROSCOPIC HYSTERECTOMY TOTAL N/A 4/10/2019    Procedure: TOTAL LAPAROSCOPIC Vaginal HYSTERECTOMY;  Surgeon: Daya Queen MD;  Location: [facility] OR     NOSE SURGERY  2005    deviated septum     WRIST SURGERY  2007    torn ligament       Social History     Tobacco Use     Smoking status: Current Every Day Smoker     Packs/day: 0.50     Years: 5.00     Pack years: 2.50     Types: Cigarettes     Smokeless tobacco: Never Used   Substance Use Topics     Alcohol use: Yes     Alcohol/week: 1.7 standard drinks     Comment: rare     Family History   Problem Relation Age of Onset     Depression Mother      Anxiety Disorder Mother      Heart Failure Father         mi     Diabetes Father      Hypertension Father      Hyperlipidemia Father      Breast Cancer Maternal Grandmother 70     Heart Failure Maternal Grandfather      Other Cancer Paternal Grandmother         lung     Heart Failure Paternal Grandfather      Depression Sister      Anxiety Disorder Sister      Depression Sister      Depression Sister          No current outpatient medications on file.     Allergies   Allergen Reactions     Erythromycin Nausea and Vomiting     Recent Labs   Lab Test 04/17/19 01/22/19  1224 03/27/18  1421 06/09/15  1030   A1C  --   --  5.0  --    LDL  --   --  136* 77   HDL  --   --  57 52   TRIG  --   --  133 72   ALT 17  --   --   --    CR 0.72  --   --   --    GFRESTIMATED >60   --   --   --    GFRESTBLACK >60  --   --   --    POTASSIUM 4.2  --   --   --    TSH  --  1.24 1.33  --         Mammo discussed, ordered for patient based on past history of lump.     Pertinent mammograms are reviewed under the imaging tab.  History of abnormal Pap smear: Status post benign hysterectomy. Health Maintenance and Surgical History updated.  PAP / HPV Latest Ref Rng & Units 3/27/2018 2015   PAP - NIL NIL   HPV 16 DNA NEG:Negative Negative Negative   HPV 18 DNA NEG:Negative Negative Negative   OTHER HR HPV NEG:Negative Negative Negative     Reviewed and updated as needed this visit by clinical staff  Tobacco  Allergies  Meds  Problems  Med Hx  Surg Hx  Fam Hx  Soc Hx          Reviewed and updated as needed this visit by Provider  Tobacco  Allergies  Meds  Problems  Med Hx  Surg Hx  Fam Hx        Past Medical History:   Diagnosis Date     Anxiety disorder 2015     Depressive disorder      Major depressive disorder, recurrent episode, mild (H) 2015      Past Surgical History:   Procedure Laterality Date     ENT SURGERY       LAPAROSCOPIC HYSTERECTOMY TOTAL N/A 4/10/2019    Procedure: TOTAL LAPAROSCOPIC Vaginal HYSTERECTOMY;  Surgeon: Daya Queen MD;  Location: [facility]     NOSE SURGERY      deviated septum     WRIST SURGERY      torn ligament     OB History    Para Term  AB Living   0 0 0 0 0 0   SAB TAB Ectopic Multiple Live Births   0 0 0 0 0       Review of Systems  CONSTITUTIONAL: NEGATIVE for fever, chills, change in weight  INTEGUMENTARY/SKIN: NEGATIVE for worrisome rashes, moles or lesions POSITIVE for lump on skin  EYES: NEGATIVE for vision changes or irritation  ENT: NEGATIVE for ear, mouth and throat problems  RESP: NEGATIVE for significant cough or SOB  BREAST: NEGATIVE for masses, tenderness or discharge  CV: NEGATIVE for chest pain, palpitations or peripheral edema  GI: NEGATIVE for nausea, abdominal pain, heartburn, or change in bowel  "habits  : NEGATIVE for unusual urinary or vaginal symptoms. No vaginal bleeding.  MUSCULOSKELETAL: NEGATIVE for significant arthralgias or myalgia POSITIVE for low back pain  NEURO: NEGATIVE for weakness, dizziness or paresthesias  ENDOCRINE: NEGATIVE for temperature intolerance, skin/hair changes  HEME/ALLERGY/IMMUNE: NEGATIVE for bleeding problems  PSYCHIATRIC: NEGATIVE for changes in mood or affect      OBJECTIVE:   /75   Pulse 91   Temp 98  F (36.7  C) (Oral)   Resp 16   Ht 1.697 m (5' 6.83\")   Wt 76.7 kg (169 lb 3.2 oz)   LMP 03/09/2019   SpO2 99%   BMI 26.63 kg/m    Physical Exam  GENERAL APPEARANCE: healthy, alert and no distress  EYES: Eyes grossly normal to inspection, PERRL and conjunctivae and sclerae normal  HENT: ear canals and TM's normal, nose and mouth without ulcers or lesions, oropharynx clear and oral mucous membranes moist  NECK: no adenopathy, no asymmetry, masses, or scars and thyroid normal to palpation  RESP: lungs clear to auscultation - no rales, rhonchi or wheezes  BREAST: normal without masses, tenderness or nipple discharge and no palpable axillary masses or adenopathy  CV: regular rate and rhythm, normal S1 S2, no S3 or S4, no murmur, click or rub, no peripheral edema and peripheral pulses strong  ABDOMEN: soft, nontender, no hepatosplenomegaly, no masses and bowel sounds normal, no CVA tenderness   (female): deferred, no concerns.   MS: no musculoskeletal defects are noted and gait is age appropriate without ataxia  SKIN: no suspicious lesions or rashes POSITIVE for small mobile, non-tender lump to neck- appears like a lipoma in the skin.   NEURO: Normal strength and tone, cranial nerves intact, sensory exam grossly normal, mentation intact and speech normal  PSYCH: mentation appears normal and affect normal/bright    Diagnostic Test Results:  Labs reviewed in Epic  See orders    ASSESSMENT/PLAN:       ICD-10-CM    1. Routine general medical examination at [facility] " "care facility Z00.00    2. Back pain, unspecified back location, unspecified back pain laterality, unspecified chronicity M54.9 CHIROPRACTIC REFERRAL   3. Encounter for screening mammogram for breast cancer Z12.31 MA Diagnostic Digital Bilateral   4. Hx of breast lump Z87.898 MA Diagnostic Digital Bilateral   5. Screening for diabetes mellitus Z13.1 Glucose   6. Screening for thyroid disorder Z13.29 TSH with free T4 reflex   7. Lipid screening Z13.220 Lipid panel reflex to direct LDL Fasting   8. Lipoma of skin and subcutaneous tissue D17.30        COUNSELING:  Reviewed preventive health counseling, as reflected in patient instructions    Estimated body mass index is 26.63 kg/m  as calculated from the following:    Height as of this encounter: 1.697 m (5' 6.83\").    Weight as of this encounter: 76.7 kg (169 lb 3.2 oz).    Weight management plan: Discussed healthy diet and exercise guidelines     reports that she has been smoking cigarettes. She has a 2.50 pack-year smoking history. She has never used smokeless tobacco.  Tobacco Cessation Action Plan: Information offered: Patient not interested at this time    Counseling Resources:  ATP IV Guidelines  Pooled Cohorts Equation Calculator  Breast Cancer Risk Calculator  FRAX Risk Assessment  ICSI Preventive Guidelines  Dietary Guidelines for Americans, 2010  USDA's MyPlate  ASA Prophylaxis  Lung CA Screening    SE Zabala  [facility] TIMOTHY  "

## 2019-11-09 ASSESSMENT — ANXIETY QUESTIONNAIRES: GAD7 TOTAL SCORE: 5

## 2019-12-20 ENCOUNTER — OFFICE VISIT (OUTPATIENT)
Dept: URGENT CARE | Facility: URGENT CARE | Age: 34
End: 2019-12-20
Payer: COMMERCIAL

## 2019-12-20 VITALS
RESPIRATION RATE: 16 BRPM | OXYGEN SATURATION: 98 % | SYSTOLIC BLOOD PRESSURE: 110 MMHG | HEART RATE: 100 BPM | TEMPERATURE: 98.3 F | DIASTOLIC BLOOD PRESSURE: 75 MMHG | WEIGHT: 168 LBS

## 2019-12-20 DIAGNOSIS — R07.0 THROAT PAIN: Primary | ICD-10-CM

## 2019-12-20 DIAGNOSIS — J02.9 ACUTE PHARYNGITIS, UNSPECIFIED ETIOLOGY: ICD-10-CM

## 2019-12-20 LAB
DEPRECATED S PYO AG THROAT QL EIA: NORMAL
SPECIMEN SOURCE: NORMAL

## 2019-12-20 PROCEDURE — 99213 OFFICE O/P EST LOW 20 MIN: CPT | Performed by: PHYSICIAN ASSISTANT

## 2019-12-20 PROCEDURE — 87880 STREP A ASSAY W/OPTIC: CPT | Performed by: PHYSICIAN ASSISTANT

## 2019-12-20 PROCEDURE — 87081 CULTURE SCREEN ONLY: CPT | Performed by: PHYSICIAN ASSISTANT

## 2019-12-20 RX ORDER — BENZONATATE 100 MG/1
100 CAPSULE ORAL 3 TIMES DAILY PRN
Qty: 21 CAPSULE | Refills: 0 | Status: SHIPPED | OUTPATIENT
Start: 2019-12-20 | End: 2020-05-22

## 2019-12-20 NOTE — PATIENT INSTRUCTIONS
Patient Education     Self-Care for Sore Throats    Sore throats happen for many reasons, such as colds, allergies, and infections caused by viruses or bacteria. In any case, your throat becomes red and sore. Your goal for self-care is to reduce your discomfort while giving your throat a chance to heal.  Moisten and soothe your throat  Tips include the following:    Try a sip of water first thing after waking up.    Keep your throat moist by drinking 6 or more glasses of clear liquids every day.    Run a cool-air humidifier in your room overnight.    Avoid cigarette smoke.     Suck on throat lozenges, cough drops, hard candy, ice chips, or frozen fruit-juice bars. Use the sugar-free versions if your diet or medical condition requires them.  Gargle to ease irritation  Gargling every hour or 2 can ease irritation. Try gargling with 1 of these solutions:    1/4 teaspoon of salt in 1/2 cup of warm water    An over-the-counter anesthetic gargle  Use medicine for more relief  Over-the-counter medicine can reduce sore throat symptoms. Ask your pharmacist if you have questions about which medicine to use:    Ease pain with anesthetic sprays. Aspirin or an aspirin substitute also helps. Remember, never give aspirin to anyone 18 or younger, or if you are already taking blood thinners.     For sore throats caused by allergies, try antihistamines to block the allergic reaction.    Remember: unless a sore throat is caused by a bacterial infection, antibiotics won t help you.  Prevent future sore throats  Prevention tips include the following:    Stop smoking or reduce contact with secondhand smoke. Smoke irritates the tender throat lining.    Limit contact with pets and with allergy-causing substances, such as pollen and mold.    When you re around someone with a sore throat or cold, wash your hands often to keep viruses or bacteria from spreading.    Don t strain your vocal cords.  Call your healthcare provider  Contact your  healthcare provider if you have:    A temperature over 101 F (38.3 C)    White spots on the throat    Great difficulty swallowing    Trouble breathing    A skin rash    Recent exposure to someone else with strep bacteria    Severe hoarseness and swollen glands in the neck or jaw   Date Last Reviewed: 8/1/2016 2000-2018 [employer]. [address] [place], [place] [address]. All rights reserved. This information is not intended as a substitute for professional medical care. Always follow your healthcare professional's instructions.

## 2019-12-20 NOTE — PROGRESS NOTES
SUBJECTIVE:   Shanell Alfaro is a 34 year old female presenting with a chief complaint of ear pain bilateral, sore throat and headache.  Onset of symptoms was 1 day(s) ago.  Course of illness is worsening.    Severity moderate  Treatment measures tried include Tylenol/Ibuprofen, Decongestants, Fluids and Rest.  Predisposing factors include tobacco use.    No past medical history on file.  No current outpatient medications on file.     Social History     Tobacco Use     Smoking status: Current Every Day Smoker     Types: Cigarettes     Smokeless tobacco: Never Used   Substance Use Topics     Alcohol use: Yes     Comment: social       ROS:  CONSTITUTIONAL:NEGATIVE for fever, chills, change in weight  INTEGUMENTARY/SKIN: NEGATIVE for worrisome rashes, moles or lesions  EYES: NEGATIVE for vision changes or irritation  ENT/MOUTH: See HPI  RESP:NEGATIVE for significant cough or SOB  CV: NEGATIVE for chest pain, palpitations or peripheral edema  GI: NEGATIVE for nausea, abdominal pain, heartburn, or change in bowel habits  HEME/ALLERGY/IMMUNE: NEGATIVE for bleeding problems  PSYCHIATRIC: NEGATIVE for changes in mood or affect    OBJECTIVE:  /75 (BP Location: Left arm, Patient Position: Sitting, Cuff Size: Adult Large)   Pulse 100   Temp 98.3  F (36.8  C) (Oral)   Resp 16   Wt 76.2 kg (168 lb)   SpO2 98%   GENERAL APPEARANCE: healthy, alert and no distress  EYES: EOMI,  PERRL, conjunctiva clear  HENT: ear canals and TM's normal.  Nose without ulcers, erythema or lesions. Pharynx was moderately erythematous and swollen but without exudates.   NECK: Patient has mild cervical and post auricular lymphadenopathy.   RESP: lungs clear to auscultation - no rales, rhonchi or wheezes  CV: regular rates and rhythm, normal S1 S2, no murmur noted  ABDOMEN:  soft, nontender, no HSM or masses and bowel sounds normal  NEURO: Normal strength and tone, sensory exam grossly normal,  normal speech and mentation  SKIN: no  suspicious lesions or rashes    ASSESSMENT/PLAN:    Results for orders placed or performed in visit on 12/20/19   Strep, Rapid Screen     Status: None   Result Value Ref Range    Specimen Description Throat     Rapid Strep A Screen       NEGATIVE: No Group A streptococcal antigen detected by immunoassay, await culture report.     (R07.0) Throat pain  (primary encounter diagnosis)  Plan: Strep, Rapid Screen, Beta strep group A culture    (J02.9) Acute pharyngitis, unspecified etiology  Plan: benzonatate (TESSALON) 100 MG capsule      Age 6-12:   Okay to use Children Motrin OTC    Adults:  Okay to take acetaminophen 500 mg- 2 tabs (Total of 1000 mg) every 8 hrs   Okay to take ibuprofen 200 mg- 3 tabs (Total of 600 mg) every 6 hours        Okay to use Neti pot for sinus lavage up to three times daily for congestion and sinus pressure. Daily hot shower can be beneficial for congestion and body aches. Okay to use bedroom vaporizer or humidifier if symptoms are worse at night. Nightly Vicks Vapor rub okay to use for sleep.     OTC cough medication and decongestants okay if not prescribed by me during this visit.       Okay to use salt water gargles and lozenges for throat discomfort.     Patient will need to get plenty of rest and drink at least 1.5-2 liters of fluids daily for adults and 1-1.5 liters for children. If vomiting and not tolerating liquids for more than 24 hrs, please go to your nearest emergency department for IV fluids and further treatment.     Patient is not contagious after 1 week from start of symptoms. If possible, wear mask for first 7 days. Wash hands regularly and vigorously for 30 seconds often.     Patient was advised to return to clinic if symptoms do not improve in the amount of time specified in the AVS or if symptoms worsen. Patient educated on red flag symptoms and asked to go directly to the ED if symptoms present themselves.     Jamie Cooper PA-C on 12/20/2019 at 5:15 PM

## 2019-12-21 LAB
BACTERIA SPEC CULT: NORMAL
SPECIMEN SOURCE: NORMAL

## 2020-04-13 ENCOUNTER — VIRTUAL VISIT (OUTPATIENT)
Dept: FAMILY MEDICINE | Facility: OTHER | Age: 35
End: 2020-04-13

## 2020-04-13 ENCOUNTER — MYC MEDICAL ADVICE (OUTPATIENT)
Dept: FAMILY MEDICINE | Facility: CLINIC | Age: 35
End: 2020-04-13

## 2020-04-13 ENCOUNTER — E-VISIT (OUTPATIENT)
Dept: FAMILY MEDICINE | Facility: CLINIC | Age: 35
End: 2020-04-13
Payer: COMMERCIAL

## 2020-04-13 DIAGNOSIS — J06.9 VIRAL URI: Primary | ICD-10-CM

## 2020-04-13 PROCEDURE — 99207 ZZC NON-BILLABLE SERV PER CHARTING: CPT | Performed by: NURSE PRACTITIONER

## 2020-04-13 NOTE — PROGRESS NOTES
"Date: 2020 16:07:29  Clinician: Zee Sanabria  Clinician NPI: [id number]  Patient: Shanell Alfaro  Patient : 1985  Patient Address: [address] [place] [address]  Patient Phone: [phone]  Visit Protocol: URI  Patient Summary:  Shanell is a 34 year old ( : 1985 ) female who initiated a Visit for COVID-19 (Coronavirus) evaluation and screening. When asked the question \"Please sign me up to receive news, health information and promotions from [facility].\", Shanell responded \"No\".    Shanell states her symptoms started 1-2 days ago.   Her symptoms consist of myalgia, nausea, a sore throat, and malaise.   Symptom details   Sore throat: Shanell reports having mild throat pain (1-3 on a 10 point pain scale), does not have exudate on her tonsils, and can swallow liquids. She is not sure if the lymph nodes in her neck are enlarged. A rash has not appeared on the skin since the sore throat started.    Shanell denies having rhinitis, facial pain or pressure, chills, diarrhea, vomiting, teeth pain, headache, fever, wheezing, cough, nasal congestion, and ear pain. She also denies taking antibiotic medication for the symptoms and having recent facial or sinus surgery in the past 60 days. She is not experiencing dyspnea.   Precipitating events  Shanell is not sure if she has been exposed to someone with strep throat. She has not recently been exposed to someone with influenza. Shanell has been in close contact with the following high risk individuals: people with asthma, heart disease or diabetes, adults 65 or older, and immunocompromised people.   Pertinent COVID-19 (Coronavirus) information  Shanell has not traveled internationally or to the areas where COVID-19 (Coronavirus) is widespread, including cruise ship travel in the last 14 days before the start of her symptoms.   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 Additional job details as reported by the patient (free text):  at the [place] [employer]   She does not live with a healthcare worker.   Shanell has not had a close contact with a laboratory-confirmed COVID-19 patient within 14 days of symptom onset. She also has not had a close contact with a suspected COVID-19 patient within 14 days of symptom onset.   Pertinent medical history  Shanell does not get yeast infections when she takes antibiotics.   Shanell needs a return to work/school note.   Weight: 170 lbs   Shanell smokes or uses smokeless tobacco.   She denies pregnancy and denies breastfeeding. She does not menstruate.   Weight: 170 lbs    MEDICATIONS: No current medications, ALLERGIES: erythromycin base  Clinician Response:  Dear Shanell,   Dear Shanell  Your symptoms show that you may have coronavirus (COVID-19). This illness can cause fever, cough and trouble breathing. Many people get a mild case and get better on their own. Some people can get very sick.   Will I be tested for COVID-19?  Because the virus is spreading, we are no longer testing most patients. You may request testing if:   You are very ill. For example, you're on chemotherapy, dialysis or home hospice care. (Contact your specialty clinic or program.)   You live in a nursing home or other long-term care facility. (Talk to your nurse manager or medical director.)   You're a health care worker. (Contact your employee health office.)   How can I protect others?  Without a test, we can't know for sure that you have COVID-19. For safety, it's very important to follow these rules.  First, stay home and away from others (self-isolate) until:   You've had no fever---and no medicine that reduces fever---for 3 full days (72 hours). And...    Your other symptoms have gotten better. For example, your cough or breathing has improved. And...   At  least 7 days have passed since your symptoms started.   During this time:   Don't go to work, school or anywhere else.    Stay away from others in your home. No hugging, kissing or shaking hands.   Don't let anyone visit.   Cover your mouth and nose with a mask, tissue or wash cloth to avoid spreading germs.   Wash your hands and face often. Use soap and water.   How can I take care of myself?   1.Take Tylenol (acetaminophen) for fever or pain. If you have liver or kidney problems, ask your family doctor if it's okay to take Tylenol.        Adults can take either:    650 mg (two 325 mg pills) every 4 to 6 hours, or...   1,000 mg (two 500 mg pills) every 8 hours as needed.    Note: Don't take more than 3,000 mg in one day.   For children, check the Tylenol bottle for the right dose. The dose is based on the child's age or weight.   2.If you have other health problems (like cancer, heart failure, an organ transplant or severe kidney disease): Call your specialty clinic if you don't feel better in the next 2 days.       3.Know when to call 911: If your breathing is so bad that it keeps you from doing normal activities, call 911 or go to the emergency room. Tell them that you've been staying home and may have COVID-19.   Where can I get more information?  To learn more about COVID-19 and how to care for yourself at home, please visit the CDC website at https://www.cdc.gov/coronavirus/2019-ncov/about/steps-when-sick.html.  For more about your care at [facility], please visit [email or web address]     Diagnosis: Acute pharyngitis, unspecified  Diagnosis ICD: J02.9  Addendum created: April 13 17:47:48, 2020 created by: Randa Storey body: As a general guideline Shanell can return to work 04/20/20 however, she can return to work sooner if she meets the following criteria:     No fever for 3 days    And...   Your other symptoms have gotten better. For example, your cough or breathing has improved.      And...  At least 7 days have passed since your symptoms started.

## 2020-05-22 ENCOUNTER — VIRTUAL VISIT (OUTPATIENT)
Dept: FAMILY MEDICINE | Facility: CLINIC | Age: 35
End: 2020-05-22
Payer: COMMERCIAL

## 2020-05-22 DIAGNOSIS — M25.552 HIP PAIN, LEFT: Primary | ICD-10-CM

## 2020-05-22 PROCEDURE — 99213 OFFICE O/P EST LOW 20 MIN: CPT | Mod: GT | Performed by: NURSE PRACTITIONER

## 2020-05-22 RX ORDER — PREDNISONE 20 MG/1
40 TABLET ORAL DAILY
Qty: 10 TABLET | Refills: 0 | Status: SHIPPED | OUTPATIENT
Start: 2020-05-22 | End: 2020-05-27

## 2020-05-22 NOTE — PROGRESS NOTES
"Shanell Alfaro is a 34 year old female who is being evaluated via a billable video visit.      The patient has been notified of following:     \"This video visit will be conducted via a call between you and your physician/provider. We have found that certain health care needs can be provided without the need for an in-person physical exam.  This service lets us provide the care you need with a video conversation.  If a prescription is necessary we can send it directly to your pharmacy.  If lab work is needed we can place an order for that and you can then stop by our lab to have the test done at a later time.    Video visits are billed at different rates depending on your insurance coverage.  Please reach out to your insurance provider with any questions.    If during the course of the call the physician/provider feels a video visit is not appropriate, you will not be charged for this service.\"    Patient has given verbal consent for Video visit? Yes    How would you like to obtain your AVS? [facility]    Patient would like the video invitation sent by: Text to cell phone: [phone]    Will anyone else be joining your video visit? No    Subjective     Shanell Alfaro is a 34 year old female who presents today via video visit for the following health issues:    HPI    Pain  Onset: ongoing    Description:   Location: hip pain-left hip- in groin area.  Does not wake up with pain. Pain gets worse with activity throughout the day, occurring daily now.  Entire leg can go numb.     Progression of Symptoms: worse    Accompanying Signs & Symptoms:  Other symptoms: numbness    Precipitating factors:   Trauma or overuse: YES- low vitamin d   Therapies Tried and outcome: multi vitamin       Video Start Time: 3:20    Patient Active Problem List   Diagnosis     CARDIOVASCULAR SCREENING; LDL GOAL LESS THAN 160     Tobacco use disorder     Endometriosis     Uterine leiomyoma, unspecified location     Adjustment disorder " with mixed anxiety and depressed mood     History of partial hysterectomy     Lipoma of skin and subcutaneous tissue     Hx of breast lump     Past Surgical History:   Procedure Laterality Date     ENT SURGERY       LAPAROSCOPIC HYSTERECTOMY TOTAL N/A 4/10/2019    Procedure: TOTAL LAPAROSCOPIC Vaginal HYSTERECTOMY;  Surgeon: Daya Queen MD;  Location: [facility]     NOSE SURGERY  2005    deviated septum     WRIST SURGERY  2007    torn ligament       Social History     Tobacco Use     Smoking status: Current Every Day Smoker     Packs/day: 0.50     Years: 5.00     Pack years: 2.50     Types: Cigarettes     Smokeless tobacco: Never Used   Substance Use Topics     Alcohol use: Yes     Comment: social     Family History   Problem Relation Age of Onset     Depression Mother      Anxiety Disorder Mother      Heart Failure Father         mi     Diabetes Father      Hypertension Father      Hyperlipidemia Father      Breast Cancer Maternal Grandmother 70     Heart Failure Maternal Grandfather      Other Cancer Paternal Grandmother         lung     Heart Failure Paternal Grandfather      Depression Sister      Anxiety Disorder Sister      Depression Sister      Depression Sister          Current Outpatient Medications   Medication Sig Dispense Refill     predniSONE (DELTASONE) 20 MG tablet Take 2 tablets (40 mg) by mouth daily for 5 days 10 tablet 0     Allergies   Allergen Reactions     Erythromycin Nausea and Vomiting     Recent Labs   Lab Test 11/08/19  0923 04/17/19 01/22/19  1224 03/27/18  1421  06/09/15  1030   A1C  --   --   --  5.0  --   --    *  --   --  136*  --  77   HDL 46*  --   --  57  --  52   TRIG 77  --   --  133  --  72   ALT  --  17  --   --   --   --    CR  --  0.72  --   --   --   --    GFRESTIMATED  --  >60  --   --   --   --    GFRESTBLACK  --  >60  --   --   --   --    POTASSIUM  --  4.2  --   --   --   --    TSH 1.19  --  1.24 1.33   < >  --     < > = values in this interval not displayed.  "     BP Readings from Last 3 Encounters:   12/20/19 110/75   11/08/19 120/75   05/14/19 120/76    Wt Readings from Last 3 Encounters:   12/20/19 76.2 kg (168 lb)   11/08/19 76.7 kg (169 lb 3.2 oz)   05/14/19 74.2 kg (163 lb 9.6 oz)                    Reviewed and updated as needed this visit by Provider  Tobacco  Allergies  Meds  Problems  Med Hx  Surg Hx  Fam Hx         Review of Systems   Constitutional, HEENT, cardiovascular, pulmonary, GI, , musculoskeletal, neuro, skin, endocrine and psych systems are negative, except as otherwise noted.      Objective    LMP 03/09/2019   Estimated body mass index is 26.45 kg/m  as calculated from the following:    Height as of 11/8/19: 1.697 m (5' 6.83\").    Weight as of 12/20/19: 76.2 kg (168 lb).  Physical Exam     GENERAL: Healthy, alert and no distress  EYES: Eyes grossly normal to inspection.  No discharge or erythema, or obvious scleral/conjunctival abnormalities.  RESP: No audible wheeze, cough, or visible cyanosis.  No visible retractions or increased work of breathing.    MS: No gross musculoskeletal defects noted.  Normal range of motion.  No visible edema. Per pt no pain with palpation- does not feel muscular  SKIN: Visible skin clear. No significant rash, abnormal pigmentation or lesions.  NEURO: Cranial nerves grossly intact.  Mentation and speech appropriate for age.  PSYCH: Mentation appears normal, affect normal/bright, judgement and insight intact, normal speech and appearance well-groomed.      Diagnostic Test Results:  Labs reviewed in Epic  See orders        Assessment & Plan       ICD-10-CM    1. Hip pain, left  M25.552 predniSONE (DELTASONE) 20 MG tablet     MR Hip Left w/o Contrast        BMI:   Estimated body mass index is 26.45 kg/m  as calculated from the following:    Height as of 11/8/19: 1.697 m (5' 6.83\").    Weight as of 12/20/19: 76.2 kg (168 lb).   Weight management plan: Discussed healthy diet and exercise guidelines        See " Patient Instructions; patient declining PT.  If symptoms persist after course of prednisone, she will schedule MRI of hip    Return in about 4 weeks (around 6/19/2020), or if symptoms worsen or fail to improve.    Lisette Plata NP  [facility]      Video-Visit Details    Type of service:  Video Visit    Video End Time:3:34 PM    Originating Location (pt. Location): Home    Distant Location (provider location):  [facility]     Platform used for Video Visit: Doximity    Return in about 4 weeks (around 6/19/2020), or if symptoms worsen or fail to improve.       Lisette Plata NP

## 2020-05-22 NOTE — PATIENT INSTRUCTIONS
Patient Education     Understanding Osteoarthritis of the Hip     A joint is a place where 2 bones meet. The hip is a ball-and-socket joint. It is formed where the ball at the top of the thighbone (femur) rests in the socket in the pelvic bone. The hip joint allows the leg to move freely in many directions.  Hip osteoarthritis is a condition where parts of the hip joint wear out. It can lead to pain, stiffness, and limited movement.   What is osteoarthritis?  All joints contain a smooth tissue called cartilage. Cartilage cushions the ends of bones, helping them glide against each other. Hip osteoarthritis occurs when cartilage in the hip joint starts to break down and wear away. The ball and socket bones may then become exposed and rub together. The cartilage may become rough and pitted. It may start to wear away. This prevents smooth movement of the joint and can lead to pain.  Causes of osteoarthritis of the hip  Causes can include:    Wear and tear from normal use of the hip over time    Overuse of the hip during sports or work activities    Being overweight. This increases stress on the hip joint.    Injury to the hip    Infection of the hip joint  Symptoms of osteoarthritis of the hip  The main symptom of hip osteoarthritis is pain. The pain is most often felt in the groin area. It may also travel down the leg to the knee or to the back of the hip. The pain usually gets worse with activity, such as walking or climbing stairs. The pain may get better with rest. The joint may also be stiff first thing in the morning or after periods of sitting or lying down. Stiffness usually gets better with movement.  Treating osteoarthritis of the hip  Osteoarthritis is a long-term (chronic) condition. Treatment usually focuses on managing symptoms. Treatment may include:    Over-the-counter or prescription medicines taken by mouth to help relieve pain and swelling    Shots of medicine into the joint to help ease symptoms  for a time    A weight-loss plan if you are overweight    A plan of physical therapy and exercises to improve strength and flexibility around the joint    Cold or heat packs help ease pain and stiffness    Assistive devices that help with movement, such as a cane or a walker    Assistive devices that make activities of daily life easier, such as raised toilet seats or shower bars  If other treatments don t do enough to ease severe symptoms, you may need surgery to replace the joint. This surgery replaces the hip joint with an artificial joint. It can help relieve pain and stiffness and improve function of the hip.  When to call your healthcare provider  Call your healthcare provider right away if you have any of these:    Fever of 100.4 F (38 C) or higher, or as directed by your healthcare provider    Symptoms that don t get better with prescribed medicines or get worse    New symptoms  Date Last Reviewed: 7/1/2018 2000-2019 [employer]. [address] [place] [address], [place] [address]. All rights reserved. This information is not intended as a substitute for professional medical care. Always follow your healthcare professional's instructions.

## 2020-06-08 ENCOUNTER — ANCILLARY PROCEDURE (OUTPATIENT)
Dept: MRI IMAGING | Facility: CLINIC | Age: 35
End: 2020-06-08
Attending: NURSE PRACTITIONER
Payer: COMMERCIAL

## 2020-06-08 DIAGNOSIS — M25.552 HIP PAIN, LEFT: ICD-10-CM

## 2020-06-08 PROCEDURE — 73721 MRI JNT OF LWR EXTRE W/O DYE: CPT | Mod: TC

## 2020-06-10 DIAGNOSIS — M25.552 HIP PAIN, LEFT: Primary | ICD-10-CM

## 2020-06-10 DIAGNOSIS — R93.89 ABNORMAL MRI: ICD-10-CM

## 2020-06-15 DIAGNOSIS — M25.552 HIP PAIN, LEFT: Primary | ICD-10-CM

## 2020-06-15 DIAGNOSIS — S76.012D TEAR OF LEFT GLUTEUS MINIMUS TENDON, SUBSEQUENT ENCOUNTER: ICD-10-CM

## 2020-06-15 NOTE — RESULT ENCOUNTER NOTE
Justino Reece,    Thank you for your recent office visit.    Here are your recent results.  Per radiology-  Impression:  1. Partial thickness tearing of the left hip gluteus minimus tendon  appears trochanter, with mild adjacent soft tissue and bone marrow  Edema.    I will place an order for you to see orthopedics.     Feel free to contact me via kooldiner or call the clinic at [phone].    Sincerely,    Lisette Plata, LESLY, FNP-BC

## 2020-06-16 ENCOUNTER — OFFICE VISIT (OUTPATIENT)
Dept: ORTHOPEDICS | Facility: CLINIC | Age: 35
End: 2020-06-16
Attending: PHYSICIAN ASSISTANT
Payer: COMMERCIAL

## 2020-06-16 VITALS
BODY MASS INDEX: 27.32 KG/M2 | HEIGHT: 66 IN | SYSTOLIC BLOOD PRESSURE: 116 MMHG | DIASTOLIC BLOOD PRESSURE: 78 MMHG | WEIGHT: 170 LBS

## 2020-06-16 DIAGNOSIS — M25.552 HIP PAIN, LEFT: ICD-10-CM

## 2020-06-16 DIAGNOSIS — R93.89 ABNORMAL MRI: ICD-10-CM

## 2020-06-16 DIAGNOSIS — M76.02 GLUTEAL TENDINITIS, LEFT HIP: Primary | ICD-10-CM

## 2020-06-16 DIAGNOSIS — S76.012D TEAR OF LEFT GLUTEUS MINIMUS TENDON, SUBSEQUENT ENCOUNTER: ICD-10-CM

## 2020-06-16 PROCEDURE — 99203 OFFICE O/P NEW LOW 30 MIN: CPT | Performed by: PEDIATRICS

## 2020-06-16 ASSESSMENT — MIFFLIN-ST. JEOR: SCORE: 1482.86

## 2020-06-16 NOTE — LETTER
6/16/2020         RE: Shanell Alfaro  [address]  [place] [address]        Dear Colleague,    Thank you for referring your patient, Shanell Alfaro, to the [facility]. Please see a copy of my visit note below.    Sports Medicine Clinic Visit    PCP: Lisette Plata    Shanell Alfaro is a 34 year old female who is seen  in consultation at the request of  Cynthia Black PA-C presenting with left hip pain.  Pain has been present for the past 3 months with no known injury.  Pain can be diffuse and be over the lateral aspect and in the groin.   No sensation of instability, occasional popping/grinding.    No changes in activities  Did recently undergo an MRI   Does wear a duty belt with a radio on the left side of her hip.   Was given an oral steroid by primary care with no relief.      Injury: gradual onset   **  ~1 year ago noted some hip pain, discussed with PCP; had intermittent pain.  Past few months no specific reason for increase in pain.  Pain primarily anterior. Occasional lateral hip pain; some lateral tenderness also.  + pain present at rest currently, which is typical.   Limps due to pain.  Intermittent popping sensation, but no pain associated; popping sensation is lateral hip area.  Icing helps. Ibuprofen without much benefit.      Location of Pain: left hip   Duration of Pain: 3 month(s)  Rating of Pain at worst: 8/10  Rating of Pain Currently: 5/10  Symptoms are better with: Ice  Symptoms are worse with: quick movements, prolonged walking, lifting, flexion   Additional Features:   Positive: popping and grinding   Negative: swelling, bruising, catching, locking, instability, paresthesias, numbness, weakness, pain in other joints and systemic symptoms  Other evaluation and/or treatments so far consists of: MRI   Prior History of related problems: denies      Social History:      Review of  Systems  Musculoskeletal: as above  Remainder of review of systems is negative including constitutional, CV, pulmonary, GI, Skin and Neurologic except as noted in HPI or medical history.    Past Medical History:   Diagnosis Date     Anxiety disorder 6/9/2015     Depressive disorder      Major depressive disorder, recurrent episode, mild (H) 6/9/2015     Past Surgical History:   Procedure Laterality Date     ENT SURGERY       LAPAROSCOPIC HYSTERECTOMY TOTAL N/A 4/10/2019    Procedure: TOTAL LAPAROSCOPIC Vaginal HYSTERECTOMY;  Surgeon: Daya Queen MD;  Location: [facility]     NOSE SURGERY  2005    deviated septum     WRIST SURGERY  2007    torn ligament     Family History   Problem Relation Age of Onset     Depression Mother      Anxiety Disorder Mother      Heart Failure Father         mi     Diabetes Father      Hypertension Father      Hyperlipidemia Father      Breast Cancer Maternal Grandmother 70     Heart Failure Maternal Grandfather      Other Cancer Paternal Grandmother         lung     Heart Failure Paternal Grandfather      Depression Sister      Anxiety Disorder Sister      Depression Sister      Depression Sister      Social History     Socioeconomic History     Marital status:      Spouse name: Not on file     Number of children: 0     Years of education: Not on file     Highest education level: Not on file   Occupational History     Employer: [employer]   Social Needs     Financial resource strain: Not on file     Food insecurity     Worry: Not on file     Inability: Not on file     Transportation needs     Medical: Not on file     Non-medical: Not on file   Tobacco Use     Smoking status: Current Every Day Smoker     Packs/day: 0.50     Years: 5.00     Pack years: 2.50     Types: Cigarettes     Smokeless tobacco: Never Used   Substance and Sexual Activity     Alcohol use: Yes     Comment: social     Drug use: Never     Sexual activity: Yes     Partners: Female     Birth  "control/protection: Female Surgical   Lifestyle     Physical activity     Days per week: Not on file     Minutes per session: Not on file     Stress: Not on file   Relationships     Social connections     Talks on phone: Not on file     Gets together: Not on file     Attends [employer] service: Not on file     Active member of club or organization: Not on file     Attends meetings of clubs or organizations: Not on file     Relationship status: Not on file     Intimate partner violence     Fear of current or ex partner: Not on file     Emotionally abused: Not on file     Physically abused: Not on file     Forced sexual activity: Not on file   Other Topics Concern     Parent/sibling w/ CABG, MI or angioplasty before 65F 55M? Yes   Social History Narrative    ** Merged History Encounter **            Objective  /78   Ht 1.676 m (5' 6\")   Wt 77.1 kg (170 lb)   LMP 03/09/2019   BMI 27.44 kg/m        GENERAL APPEARANCE: healthy, alert and no distress   GAIT: NORMAL  SKIN: no suspicious lesions or rashes  NEURO: Normal strength and tone, mentation intact and speech normal  PSYCH:  mentation appears normal and affect normal/bright  HEENT: no scleral icterus  CV: distal perfusion intact  RESP: nonlabored breathing      Left hip exam    Inspection:        no edema or ecchymosis in hip area    ROM:       Flexion with pain, minimal limitation actively, grossly full passive      internal rotation pain at end range      external rotation pain at end range  Rotation left similar compared to right    Strength:        flexion 5-/5       abduction 4/5       adduction 4+/5    Tender:        greater trochanter    Sensation:        grossly intact in hip and thigh    Skin:       well perfused       capillary refill brisk    Special Tests:        positive (+) FADIR       positive (+) scour       Pain laterally with Doreen       Log roll positive    Radiology  Visualized the MRI study noted below, and reviewed the images and the " report with the patient.  Lateral hip inflammation, gluteal tendinosis and greater trochanteric marrow edema.  There is also some intra-articular irregularity, labrum and possibly articular chondromalacia.    Results for orders placed or performed in visit on 06/08/20   MR Hip Left w/o Contrast    Narrative    MR left hip without contrast 6/8/2020 10:56 AM    Techniques: Multiplanar multisequence imaging of the left hip was  obtained without  administration of intra-articular or intravenous  contrast using routing protocol.    History: Hip pain, chronic, initial exam; contrast per radiologist;  Hip pain, left     Comparison: None    Findings:    Osseous structures  Osseous structures: No fracture, stress reaction, avascular necrosis,  or focal osseous lesion is seen. Small amounts of bony edema at the  lateral aspect of the greater trochanter.    Articular cartilage and labrum  Assessment limited on this non-arthrographic study due to relative  lack of joint distension.    Articular cartilage: No definite full-thickness cartilage loss    Labrum: Questionable fraying at the anterosuperior labrum.    Ligament teres and transverse ligament of acetabulum: Intact.    Joint or bursal effusion    Joint effusion: Small amount of joint fluid which may be physiologic    Bursal effusion: No substantial iliopsoas or trochanteric bursal  effusion.    Muscles and tendons  Muscles and tendons: Proximal hamstrings, rectus femoris, sartorius,  and iliopsoas tendons intact. Partial tearing at the origin of the  gluteus minimus with mild soft tissue and bony edema (series 6 image  18). Muscle bulk is preserved. The visualized adductor muscles are  unremarkable.     Nerves:  The visualized course of the sciatic nerve is unremarkable.    Other Findings:  None.      Impression    Impression:  1. Partial thickness tearing of the left hip gluteus minimus tendon  appears trochanter, with mild adjacent soft tissue and bone marrow  edema.  2.  No evidence of fracture, osteonecrosis, or marrow infiltration.  3. Mild degenerative changes with no definite focal full thickness  cartilage loss. Questionable fraying at the anterosuperior labrum.    I have personally reviewed the examination and initial interpretation  and I agree with the findings.    SALLY MATHEW MD       Assessment:  1. Gluteal tendinitis, left hip    2. Hip pain, left    3. Abnormal MRI    4. Tear of left gluteus minimus tendon, subsequent encounter        Plan:  Discussed the assessment with the patient.  Certainly some lateral hip tenderness and pain, and this fits with MRI findings.  However, pain pattern, location of pain is more consistent with hip joint source.  We discussed the following: symptom treatment, activity modification/rest, imaging, rehab, injection therapy and medication. Following discussion, plan:  Ice, heat, OTC medication PRN.  Activity modification reviewed.  See patient instructions.  Plan to start with physical therapy.  Offered steroid injection as well.  Favor intra-articular left hip joint steroid injection, versus lateral hip.  Following discussion, she prefers to start with therapy, and I agree with this approach.  If not improving with PT, then reconsider injection.  Ultimate treatment option may be to see orthopedic surgeon, but this would be after completing conservative management.  Follow up: 4-6 weeks if not improving with therapy, sooner if needed.  Questions answered. The patient indicates understanding of these issues and agrees with the plan.    Shad Cottrell DO, CAZOHAIB    CC: Cynthia Black              Patient Instructions   Begin with physical therapy.  Can consider injection if not improving.  Would be an US guided intra articular injection by one of my colleagues.    Follow up in 4-6 weeks if not improving with therapy.  Can contact clinic thru MyChart.    If you have any further questions for your physician or physician s care team you  can call [phone] and use option 3 to leave a voice message. Calls received during business hours will be returned same day.        This note consists of symbols derived from keyboarding, dictation and/or voice recognition software. As a result, there may be errors in the script that have gone undetected. Please consider this when interpreting information found in this chart.        Again, thank you for allowing me to participate in the care of your patient.        Sincerely,        Shad Cottrell, DO

## 2020-06-16 NOTE — PATIENT INSTRUCTIONS
Begin with physical therapy.  Can consider injection if not improving.  Would be an US guided intra articular injection by one of my colleagues.    Follow up in 4-6 weeks if not improving with therapy.  Can contact clinic thru MyChart.    If you have any further questions for your physician or physician s care team you can call [phone] and use option 3 to leave a voice message. Calls received during business hours will be returned same day.

## 2020-06-16 NOTE — PROGRESS NOTES
Sports Medicine Clinic Visit    PCP: Lisette Plata Dominic is a 34 year old female who is seen  in consultation at the request of  Cynthia Black PA-C presenting with left hip pain.  Pain has been present for the past 3 months with no known injury.  Pain can be diffuse and be over the lateral aspect and in the groin.   No sensation of instability, occasional popping/grinding.    No changes in activities  Did recently undergo an MRI   Does wear a duty belt with a radio on the left side of her hip.   Was given an oral steroid by primary care with no relief.      Injury: gradual onset   **  ~1 year ago noted some hip pain, discussed with PCP; had intermittent pain.  Past few months no specific reason for increase in pain.  Pain primarily anterior. Occasional lateral hip pain; some lateral tenderness also.  + pain present at rest currently, which is typical.   Limps due to pain.  Intermittent popping sensation, but no pain associated; popping sensation is lateral hip area.  Icing helps. Ibuprofen without much benefit.      Location of Pain: left hip   Duration of Pain: 3 month(s)  Rating of Pain at worst: 8/10  Rating of Pain Currently: 5/10  Symptoms are better with: Ice  Symptoms are worse with: quick movements, prolonged walking, lifting, flexion   Additional Features:   Positive: popping and grinding   Negative: swelling, bruising, catching, locking, instability, paresthesias, numbness, weakness, pain in other joints and systemic symptoms  Other evaluation and/or treatments so far consists of: MRI   Prior History of related problems: denies      Social History:      Review of Systems  Musculoskeletal: as above  Remainder of review of systems is negative including constitutional, CV, pulmonary, GI, Skin and Neurologic except as noted in HPI or medical history.    Past Medical History:   Diagnosis Date     Anxiety disorder 6/9/2015     Depressive disorder      Major depressive  disorder, recurrent episode, mild (H) 6/9/2015     Past Surgical History:   Procedure Laterality Date     ENT SURGERY       LAPAROSCOPIC HYSTERECTOMY TOTAL N/A 4/10/2019    Procedure: TOTAL LAPAROSCOPIC Vaginal HYSTERECTOMY;  Surgeon: Daya Queen MD;  Location: [facility]     NOSE SURGERY  2005    deviated septum     WRIST SURGERY  2007    torn ligament     Family History   Problem Relation Age of Onset     Depression Mother      Anxiety Disorder Mother      Heart Failure Father         mi     Diabetes Father      Hypertension Father      Hyperlipidemia Father      Breast Cancer Maternal Grandmother 70     Heart Failure Maternal Grandfather      Other Cancer Paternal Grandmother         lung     Heart Failure Paternal Grandfather      Depression Sister      Anxiety Disorder Sister      Depression Sister      Depression Sister      Social History     Socioeconomic History     Marital status:      Spouse name: Not on file     Number of children: 0     Years of education: Not on file     Highest education level: Not on file   Occupational History     Employer: [employer]   Social Needs     Financial resource strain: Not on file     Food insecurity     Worry: Not on file     Inability: Not on file     Transportation needs     Medical: Not on file     Non-medical: Not on file   Tobacco Use     Smoking status: Current Every Day Smoker     Packs/day: 0.50     Years: 5.00     Pack years: 2.50     Types: Cigarettes     Smokeless tobacco: Never Used   Substance and Sexual Activity     Alcohol use: Yes     Comment: social     Drug use: Never     Sexual activity: Yes     Partners: Female     Birth control/protection: Female Surgical   Lifestyle     Physical activity     Days per week: Not on file     Minutes per session: Not on file     Stress: Not on file   Relationships     Social connections     Talks on phone: Not on file     Gets together: Not on file     Attends Synagogue service: Not on file      "Active member of club or organization: Not on file     Attends meetings of clubs or organizations: Not on file     Relationship status: Not on file     Intimate partner violence     Fear of current or ex partner: Not on file     Emotionally abused: Not on file     Physically abused: Not on file     Forced sexual activity: Not on file   Other Topics Concern     Parent/sibling w/ CABG, MI or angioplasty before 65F 55M? Yes   Social History Narrative    ** Merged History Encounter **            Objective  /78   Ht 1.676 m (5' 6\")   Wt 77.1 kg (170 lb)   LMP 03/09/2019   BMI 27.44 kg/m        GENERAL APPEARANCE: healthy, alert and no distress   GAIT: NORMAL  SKIN: no suspicious lesions or rashes  NEURO: Normal strength and tone, mentation intact and speech normal  PSYCH:  mentation appears normal and affect normal/bright  HEENT: no scleral icterus  CV: distal perfusion intact  RESP: nonlabored breathing      Left hip exam    Inspection:        no edema or ecchymosis in hip area    ROM:       Flexion with pain, minimal limitation actively, grossly full passive      internal rotation pain at end range      external rotation pain at end range  Rotation left similar compared to right    Strength:        flexion 5-/5       abduction 4/5       adduction 4+/5    Tender:        greater trochanter    Sensation:        grossly intact in hip and thigh    Skin:       well perfused       capillary refill brisk    Special Tests:        positive (+) FADIR       positive (+) scour       Pain laterally with Doreen       Log roll positive    Radiology  Visualized the MRI study noted below, and reviewed the images and the report with the patient.  Lateral hip inflammation, gluteal tendinosis and greater trochanteric marrow edema.  There is also some intra-articular irregularity, labrum and possibly articular chondromalacia.    Results for orders placed or performed in visit on 06/08/20   MR Hip Left w/o Contrast    Narrative    MR " left hip without contrast 6/8/2020 10:56 AM    Techniques: Multiplanar multisequence imaging of the left hip was  obtained without  administration of intra-articular or intravenous  contrast using routing protocol.    History: Hip pain, chronic, initial exam; contrast per radiologist;  Hip pain, left     Comparison: None    Findings:    Osseous structures  Osseous structures: No fracture, stress reaction, avascular necrosis,  or focal osseous lesion is seen. Small amounts of bony edema at the  lateral aspect of the greater trochanter.    Articular cartilage and labrum  Assessment limited on this non-arthrographic study due to relative  lack of joint distension.    Articular cartilage: No definite full-thickness cartilage loss    Labrum: Questionable fraying at the anterosuperior labrum.    Ligament teres and transverse ligament of acetabulum: Intact.    Joint or bursal effusion    Joint effusion: Small amount of joint fluid which may be physiologic    Bursal effusion: No substantial iliopsoas or trochanteric bursal  effusion.    Muscles and tendons  Muscles and tendons: Proximal hamstrings, rectus femoris, sartorius,  and iliopsoas tendons intact. Partial tearing at the origin of the  gluteus minimus with mild soft tissue and bony edema (series 6 image  18). Muscle bulk is preserved. The visualized adductor muscles are  unremarkable.     Nerves:  The visualized course of the sciatic nerve is unremarkable.    Other Findings:  None.      Impression    Impression:  1. Partial thickness tearing of the left hip gluteus minimus tendon  appears trochanter, with mild adjacent soft tissue and bone marrow  edema.  2. No evidence of fracture, osteonecrosis, or marrow infiltration.  3. Mild degenerative changes with no definite focal full thickness  cartilage loss. Questionable fraying at the anterosuperior labrum.    I have personally reviewed the examination and initial interpretation  and I agree with the findings.    SALLY  MD QUINCY       Assessment:  1. Gluteal tendinitis, left hip    2. Hip pain, left    3. Abnormal MRI    4. Tear of left gluteus minimus tendon, subsequent encounter        Plan:  Discussed the assessment with the patient.  Certainly some lateral hip tenderness and pain, and this fits with MRI findings.  However, pain pattern, location of pain is more consistent with hip joint source.  We discussed the following: symptom treatment, activity modification/rest, imaging, rehab, injection therapy and medication. Following discussion, plan:  Ice, heat, OTC medication PRN.  Activity modification reviewed.  See patient instructions.  Plan to start with physical therapy.  Offered steroid injection as well.  Favor intra-articular left hip joint steroid injection, versus lateral hip.  Following discussion, she prefers to start with therapy, and I agree with this approach.  If not improving with PT, then reconsider injection.  Ultimate treatment option may be to see orthopedic surgeon, but this would be after completing conservative management.  Follow up: 4-6 weeks if not improving with therapy, sooner if needed.  Questions answered. The patient indicates understanding of these issues and agrees with the plan.    Shad Cottrell, , CAQ    CC: Cynthia Black              Patient Instructions   Begin with physical therapy.  Can consider injection if not improving.  Would be an US guided intra articular injection by one of my colleagues.    Follow up in 4-6 weeks if not improving with therapy.  Can contact clinic thru MyChart.    If you have any further questions for your physician or physician s care team you can call [phone] and use option 3 to leave a voice message. Calls received during business hours will be returned same day.        This note consists of symbols derived from keyboarding, dictation and/or voice recognition software. As a result, there may be errors in the script that have gone undetected.  Please consider this when interpreting information found in this chart.

## 2020-06-22 ENCOUNTER — VIRTUAL VISIT (OUTPATIENT)
Dept: PHYSICAL THERAPY | Facility: CLINIC | Age: 35
End: 2020-06-22
Attending: PEDIATRICS
Payer: COMMERCIAL

## 2020-06-22 DIAGNOSIS — M76.02 GLUTEAL TENDINITIS, LEFT HIP: ICD-10-CM

## 2020-06-22 DIAGNOSIS — M25.552 HIP PAIN, LEFT: ICD-10-CM

## 2020-06-22 PROCEDURE — 97161 PT EVAL LOW COMPLEX 20 MIN: CPT | Mod: GT | Performed by: PHYSICAL THERAPIST

## 2020-06-22 PROCEDURE — 97110 THERAPEUTIC EXERCISES: CPT | Mod: GT | Performed by: PHYSICAL THERAPIST

## 2020-06-22 NOTE — PROGRESS NOTES
"Physical Therapy Virtual Initial Visit      The patient has been notified of following:     \"This virtual visit will be conducted between you and your provider. We have found that certain health care needs can be provided without the need for physical presence.  This service lets us provide the care you need with a virtual visit.\"    Due to external, as well as internal [facility] management of the COVID-19 Virus, Shanell Alfaro was not seen in our clinic.  As a substitution, we implemented a virtual visit to manage this patient's condition utilizing the [employer] virtual visit platform via the patient s existing code.  The provider, Jocelynn Guillermo, reviewed the patient's chart, PTRx prescription, and spoke with the patient to determine the following telemedicine visit is appropriate and effective for the patient's care.    The following type of visit was completed:   Video Visit:  The [employer] platform uses a synchronous HIPAA compliant video stream for this patient encounter.         Subjective:  The history is provided by the patient. No  was used.   Patient Health History  Shanell Alfaro being seen for L hip pain.     Date of Onset: x 3 mos.   Problem occurred: unsure   Pain score: 3-8/10.  General health as reported by patient is fair.  Pertinent medical history includes: other (hysterectomy).   Red flags:  None as reported by patient.  Medical allergies: see epic.   Surgeries include:  Other (hysterectomy).    Current medications:  None.    Current occupation is .   Primary job tasks include:  Other (prolonged walking, wears a 5-7 lb duty belt, radio sits on L hip).                  Therapist Generated HPI Evaluation  Problem details: Date of MD order for this visit was 6-16-20. Mary states she has had L hip pain for about 3 mos without known injury. The pain started very intermittent but the past 2 mos has been very persistent.  Exercise-plays softball in " "summer, walks a lot for work as   She may have an injection if does not progress with PT.         Type of problem:  Left hip.    This is a new condition.  Condition occurred with:  Insidious onset.    Patient reports pain:  Anterior and lateral.    Pain radiates to:  Other (rarely in thigh).     Associated symptoms:  Other (pop/grind). Symptoms are exacerbated by other (putting shoes on in am, quick movements, prolonged walking, pivoting, lifting leg out to side)  and relieved by ice.                        Objective:          Flexibility/Screens:       Lower Extremity:  Decreased left lower extremity flexibility:IT Band and Hamstrings    Decreased right lower extremity flexibility:  Piriformis; IT Band and Hamstrings                                               Hip Evaluation  HIP AROM:  : pain into hip flx in groin.                    Hip Strength:  : per MD notes abd 4/5, add 4+/5, flx 5-/5.                          Hip Special Testing:   Special tests hip not assessed: per MD notes pos FADIR and scour L.        Hip Palpation:    Left hip tenderness present at:   Greater Trachanter    Functional Testing:          Quad:    Single leg squat:   Left:    Significant loss of control  Right:   Significant loss of control    Bilateral leg squat:  Normal control     Proprioception:    Centrix balance test:   Left:    EC 3\"  Right:  EC 8\"  % of Uninvolved:              General   ROS    PTRx Content from today's visit:  Exercise Name: Supine Hamstring Stretch, Sets: 1 - Reps: 2, 30 sec holds - Sessions: 2, Notes: can also do sitting or standing    Exercise Name: IT Band Stretch on Wall, Sets: 1 - Reps: 2, 30 sec hold - Sessions: 2  Exercise Name: Bridging #1, Sets: 1 - Reps: 10-25x,, 5 sec hold - Sessions: 3-5x/wk, Notes: small lift  Exercise Name: Pretzel Stretch, Sets: 1 set - Reps: 2 reps each side, 15-30 sec hold - Sessions: 1-2  Exercise Name: Clamshell Feet together, Sets: 1 - Reps: 10-25 - " Sessions: 3-5x/wk, Notes: Stay rolled forward on hip - do not let hip drop backward.  Exercise Name: Virtual Visit Tips for Patients    Assessment/Plan:     Patient is a 34 year old female with left side hip complaints.    Patient has the following significant findings with corresponding treatment plan.                Diagnosis 1:  L hip pain  Pain -  self management, education and home program  Decreased ROM/flexibility - therapeutic exercise and home program  Decreased strength - therapeutic exercise, therapeutic activities and home program  Impaired balance - neuro re-education, therapeutic activities and home program  Decreased proprioception - neuro re-education, therapeutic activities and home program  Impaired muscle performance - neuro re-education and home program  Decreased function - therapeutic activities and home program  If does not progress with current program may discuss in clinic for manual therapy    Therapy Evaluation Codes:   1) History comprised of:   Personal factors that impact the plan of care:      Profession and Time since onset of symptoms.    Comorbidity factors that impact the plan of care are:      None.     Medications impacting care: None.  2) Examination of Body Systems comprised of:   Body structures and functions that impact the plan of care:      Hip.   Activity limitations that impact the plan of care are:      Walking and lifting L leg, pivot, quick movments, hip flexion.  3) Clinical presentation characteristics are:   Evolving/Changing.  4) Decision-Making    Low complexity using standardized patient assessment instrument and/or measureable assessment of functional outcome.  Cumulative Therapy Evaluation is: Low complexity.    Previous and current functional limitations:  (See Goal Flow Sheet for this information)    Short term and Long term goals: (See Goal Flow Sheet for this information)     Communication ability:  Patient appears to be able to clearly communicate and  understand verbal and written communication and follow directions correctly.  Treatment Explanation - The following has been discussed with the patient:   RX ordered/plan of care  Anticipated outcomes  Possible risks and side effects  This patient would benefit from PT intervention to resume normal activities.   Rehab potential is good.    Frequency:  2 X a month, once daily  Duration:  for 3 months  Will monitor progression, if no change either refer back to MD for injection or trial in clinic manual therapy  Discharge Plan:  Achieve all LTG.  Independent in home treatment program.  Reach maximal therapeutic benefit.    Please refer to the daily flowsheet for treatment today, total treatment time and time spent performing 1:1 timed codes.       Virtual visit contact time    Time of service began: 920 AM  Time of service ended: 955 AM  Total Time for set up, visit, and documentation: 50 minutes    Payor: [employer] / Plan: [employer] [place] [employer] / Product Type: PPO /     Procedure Code/s   Therapeutic Exercise (90311): 15 minutes  Evaluation: 20 minutes    I have reviewed the note as documented above.  This accurately captures the substance of my conversation with the patient.  [place] location[place]City[place]State[place]  Patient location: home    ___________________________________________________

## 2020-09-21 ENCOUNTER — VIRTUAL VISIT (OUTPATIENT)
Dept: FAMILY MEDICINE | Facility: OTHER | Age: 35
End: 2020-09-21
Payer: COMMERCIAL

## 2020-09-21 PROCEDURE — 99421 OL DIG E/M SVC 5-10 MIN: CPT | Performed by: PHYSICIAN ASSISTANT

## 2020-09-21 NOTE — PROGRESS NOTES
"Date: 2020 08:29:59  Clinician: Randa Storey  Clinician NPI: [id number]  Patient: Shanell Alfaro  Patient : 1985  Patient Address: [address] [place] [address]  Patient Phone: [phone]  Visit Protocol: URI  Patient Summary:  Shanell is a 35 year old ( : 1985 ) female who initiated a [facility] Visit for cold, sinus infection, or influenza. When asked the question \"Please sign me up to receive news, health information and promotions from [facility].\", Shanell responded \"No\".    Shanell states her symptoms started gradually 7-9 days ago. After her symptoms started, they improved and then got worse again.   Her symptoms consist of malaise, a sore throat, a cough, nasal congestion, a headache, ear pain, wheezing, enlarged lymph nodes, and myalgia.   Symptom details     Nasal secretions: The color of her mucus is yellow.    Cough: Shanell coughs a few times an hour and her cough is more bothersome at night. Phlegm comes into her throat when she coughs. She does not believe her cough is caused by post-nasal drip. The color of the phlegm is white and yellow.     Sore throat: Shanell reports having moderate throat pain (4-6 on a 10 point pain scale), does not have exudate on her tonsils, and can swallow liquids. The lymph nodes in her neck are enlarged. A rash has appeared on the skin since the sore throat started. Shanell uploaded photos of her rash (see below).     Wheezing: Shanell has not ever been diagnosed with asthma. Additional wheezing details as reported by the patient (free text): I can feel fluid in my chest when I take a deep breath. It had kept me out of work for the past 2 days.       Headache: She states the headache is mild (1-3 on a 10 point pain scale).      Shanell denies having chills, facial pain or pressure, fever, teeth pain, ageusia, diarrhea, anosmia, vomiting, rhinitis, and nausea. She also denies having a sinus infection within the past year, " taking antibiotic medication in the past month, and having recent facial or sinus surgery in the past 60 days. She is not experiencing dyspnea.   Precipitating events  Shanell is not sure if she has been exposed to someone with strep throat. She has not recently been exposed to someone with influenza. Shanell has not been in close contact with any high risk individuals.   Pertinent COVID-19 (Coronavirus) information  In the past 14 days, Shanell has worked in a congregate living setting.   She either works or volunteers as a healthcare worker or a , or works or volunteers in a healthcare facility. She does not provide direct patient care. Additional job details as reported by the patient (free text):  for [employer]   Shanell has not lived in a congregate living setting in the past 14 days. She does not live with a healthcare worker.   Shanell has not had a close contact with a laboratory-confirmed COVID-19 patient within 14 days of symptom onset.   Since December 2019, Shanell and has had upper respiratory infection (URI) or influenza-like illness. Has not been diagnosed with lab-confirmed COVID-19 test      Date(s) of previous URI or influenza-like illness (free-text): Mid december 2019     Symptoms Shanell experienced during previous URI or influenza-like illness as reported by the patient (free-text): Sore throat, cough, chills, fever        Pertinent medical history  Shanell does not get yeast infections when she takes antibiotics.   Shanell needs a return to work/school note.   Weight: 170 lbs   Shanell smokes or uses smokeless tobacco.   She denies pregnancy and denies breastfeeding. She does not menstruate.   Weight: 170 lbs    MEDICATIONS: No current medications, ALLERGIES: erythromycin base  Clinician Response:  Dear Shanell,  Based on the information provided, you have acute bacterial sinusitis, also known as a sinus infection. Sinus  infections are caused by bacteria or a virus and symptoms are almost always identical. The difference between the 2 types of infections is timing.  Sinus infections start as viral infections and symptoms improve on their own in about 7 days. If symptoms have not improved after 7 days or have even worsened, a bacterial infection may have developed.  Medication information  I am prescribing:       Fluticasone 50 mcg/actuation nasal spray. Inhale 2 sprays in each nostril 1 time per day; after 1 week, may adjust to 1 - 2 sprays in each nostril 1 time per day. This medication takes several days to start working, so keep taking it even if it doesn't help right away. There are no refills with this prescription.      Amoxicillin-pot clavulanate 875-125 mg oral tablet. Take 1 tablet by mouth every 12 hours for 10 days. There are no refills with this prescription.      Ventolin HFA 90 mcg/actuation aerosol inhaler. Inhale 2 puffs every 4-6 hours as needed for 5 days. There are no refills with this prescription.     Yeast infections can be a common side effect of antibiotics. The most common symptom of a yeast infection is itchiness in and around the vagina. Other signs and symptoms include burning, redness, or a thick, white vaginal discharge that looks like cottage cheese and does not have a bad smell.  If you become pregnant during this course of treatment, stop taking the medication and contact your primary care provider.  Unless you are allergic to the over-the-counter medication(s) below, I recommend using:       Acetaminophen (Tylenol or store brand) oral tablet. Take 1-2 tablets by mouth every 4-6 hours to help with the discomfort.      Ibuprofen (Advil or store brand) 200 mg oral tablet. Take 1-3 tablets (200-600 mg) by mouth every 8 hours to help with the discomfort. Make sure to take the ibuprofen with food. Do not exceed 2400 mg in 24 hours.    An antihistamine such as Allegra, Claritin, Zyrtec, or store brand.  Please follow the instructions on the package.    A decongestant such as Sudafed PE or store brand.     Over-the-counter medications do not require a prescription. Ask the pharmacist if you have any questions.  Self care  Steps you can take to be as comfortable as possible:     Rest.    Drink plenty of fluids.    Take a warm shower to loosen congestion    Use a cool-mist humidifier.    Use throat lozenges.    Suck on frozen items such as popsicles.    Drink hot tea with lemon and honey.    Gargle with warm salt water (1/4 teaspoon of salt per 8 ounce glass of water).    Take a spoonful of honey to reduce your cough.     Also, as your provider, I need you to know that becoming tobacco-free is the most important thing you can do to protect your current and future health.  When to seek care  Please be seen in a clinic or urgent care if any of the following occur:     New symptoms develop, or symptoms become worse    Symptoms do not start to improve after 3 days of treatment     Call ahead before going to the clinic or urgent care.  It is possible to have an allergic reaction to an antibiotic even if you have not had one in the past. If you notice a new rash, significant swelling, or difficulty breathing, stop taking this medication immediately and go to a clinic or urgent care.  Call 911 or go to the emergency room if you feel that your throat is closing off, you suddenly develop a rash, you are unable to swallow fluids, you are drooling, or you are having difficulty breathing.  Additional treatment plan   Your symptoms show that you may have coronavirus (COVID-19). This illness can cause fever, cough and trouble breathing. Many people get a mild case and get better on their own. Some people can get very sick.  Based on the symptoms you have shared, I would like you to be re-checked in 2 to 3 days. Please call your family clinic to set up a video or phone visit.  Will I be tested for COVID-19?  We would like to test  "you for this virus.   Please call [phone] to schedule your visit. Explain that you were referred by [facility] to have a COVID-19 test. Be ready to share your [facility] visit ID number.   The following will serve as your written order for this COVID Test, ordered by me, for the indication of suspected COVID [Z20.828]: The test will be ordered in [facility], our electronic health record, after you are scheduled. It will show as ordered and authorized by Todd Doe MD.  Order: COVID-19 (Coronavirus) PCR for SYMPTOMATIC testing from [facility].  1.When it's time for your COVID test:   Stay at least 6 feet away from others. (If someone will drive you to your test, stay in the backseat, as far away from the  as you can.)   Cover your mouth and nose with a mask, tissue or washcloth.  Go straight to the testing site. Don't make any stops on the way there or back.      2.Starting now: Stay home and away from others (self-isolate) until:   You've had no fever---and no medicine that reduces fever---for one full day (24 hours). And...   Your other symptoms have gotten better. For example, your cough or breathing has improved. And...   At least 10 days have passed since your symptoms started.       During this time, don't leave the house except for testing or medical care.   Stay in your own room, even for meals. Use your own bathroom if you can.   Stay away from others in your home. No hugging, kissing or shaking hands. No visitors.  Don't go to work, school or anywhere else.    Clean \"high touch\" surfaces often (doorknobs, counters, handles, etc.). Use a household cleaning spray or wipes. You'll find a full list of  on the EPA website: www.epa.gov/pesticide-registration/list-n-disinfectants-use-against-sars-cov-2.   Cover your mouth and nose with a mask, tissue or washcloth to avoid spreading germs.  Wash your hands and face often. Use soap and water.  Caregivers in these groups are at risk for severe illness due to " COVID-19:  o People 65 years and older  o People who live in a nursing home or long-term care facility  o People with chronic disease (lung, heart, cancer, diabetes, kidney, liver, immunologic)   o People who have a weakened immune system, including those who:   Are in cancer treatment  Take medicine that weakens the immune system, such as corticosteroids  Had a bone marrow or organ transplant  Have an immune deficiency  Have poorly controlled HIV or AIDS  Are obese (body mass index of 40 or higher)  Smoke regularly   o Caregivers should wear gloves while washing dishes, handling laundry and cleaning bedrooms and bathrooms.  o Use caution when washing and drying laundry: Don't shake dirty laundry, and use the warmest water setting that you can.  o For more tips, go to www.cdc.gov/coronavirus/2019-ncov/downloads/10Things.pdf.      How can I take care of myself?   Get lots of rest. Drink extra fluids (unless a doctor has told you not to)   Take Tylenol (acetaminophen) for fever or pain. If you have liver or kidney problems, ask your family doctor if it's okay to take Tylenol.   Adults can take either:    650 mg (two 325 mg pills) every 4 to 6 hours, or...   1,000 mg (two 500 mg pills) every 8 hours as needed.    Note: Don't take more than 3,000 mg in one day. Acetaminophen is found in many medicines (both prescribed and over-the-counter medicines). Read all labels to be sure you don't take too much.   For children, check the Tylenol bottle for the right dose. The dose is based on the child's age or weight.    If you have other health problems (like cancer, heart failure, an organ transplant or severe kidney disease): Call your specialty clinic if you don't feel better in the next 2 days.       Know when to call 911. Emergency warning signs include:    Trouble breathing or shortness of breath Pain or pressure in the chest that doesn't go away Feeling confused like you haven't felt before, or not being able to wake up  Bluish-colored lips or face  Where can I get more information?   [facility] -- About COVID-19: [email or web address]   CDC -- What to Do If You're Sick: www.cdc.gov/coronavirus/2019-ncov/about/steps-when-sick.html   CDC -- Ending Home Isolation: www.cdc.gov/coronavirus/2019-ncov/hcp/disposition-in-home-patients.html   [facility] -- Caring for Someone: www.cdc.gov/coronavirus/2019-ncov/if-you-are-sick/care-for-someone.html   [facility] -- Interim Guidance for Hospital Discharge to Home: www.[email or web address].[place]/diseases/coronavirus/hcp/hospdischarge.pdf   [facility] clinical trials (COVID-19 research studies): [email or web address].[email or web address]-clinical-trials    Below are the COVID-19 hotlines at the [place] Department of Health ([facility]). Interpreters are available.    For health questions: Call 831-920-1061 or 1-915.666.9842 (7 a.m. to 7 p.m.) For questions about schools and childcare: Call 876-962-5731 or 1-500.701.8804 (7 a.m. to 7 p.m.)       Diagnosis: Cough  Diagnosis ICD: R05  Prescription: fluticasone 50 mcg/actuation nasal spray,suspension 1 120 spray aerosol with adapter (grams), 30 days supply. Inhale 2 sprays in each nostril 1 time per day; after 1 week, may adjust to 1 - 2 sprays in each nostril 1 time per day.. Refills: 0, Refill as needed: no, Allow substitutions: yes  Prescription: Ventolin HFA 90 mcg/actuation inhalation HFA aerosol inhaler 1 200 inhalation canister, 5 days supply. Inhale 2 puffs every 4-6 hours as needed for 5 days. Refills: 0, Refill as needed: no, Allow substitutions: yes  Prescription: amoxicillin-pot clavulanate 875-125 mg oral tablet 20 tablet, 10 days supply. Take 1 tablet by mouth every 12 hours for 10 days. Refills: 0, Refill as needed: no, Allow substitutions: yes  Pharmacy: [id number] - [phone] - [address] [address],  [place] [address]

## 2020-10-06 PROBLEM — M25.552 HIP PAIN, LEFT: Status: RESOLVED | Noted: 2020-06-22 | Resolved: 2020-10-06

## 2020-11-11 ASSESSMENT — ENCOUNTER SYMPTOMS
NERVOUS/ANXIOUS: 1
BREAST MASS: 1

## 2020-11-12 ENCOUNTER — OFFICE VISIT (OUTPATIENT)
Dept: FAMILY MEDICINE | Facility: CLINIC | Age: 35
End: 2020-11-12
Payer: COMMERCIAL

## 2020-11-12 VITALS
TEMPERATURE: 97.3 F | WEIGHT: 167 LBS | SYSTOLIC BLOOD PRESSURE: 126 MMHG | OXYGEN SATURATION: 100 % | DIASTOLIC BLOOD PRESSURE: 77 MMHG | HEIGHT: 67 IN | BODY MASS INDEX: 26.21 KG/M2 | HEART RATE: 85 BPM | RESPIRATION RATE: 16 BRPM

## 2020-11-12 DIAGNOSIS — Z12.31 ENCOUNTER FOR SCREENING MAMMOGRAM FOR BREAST CANCER: ICD-10-CM

## 2020-11-12 DIAGNOSIS — Z23 NEEDS FLU SHOT: ICD-10-CM

## 2020-11-12 DIAGNOSIS — Z00.01 ENCOUNTER FOR GENERAL ADULT MEDICAL EXAMINATION WITH ABNORMAL FINDINGS: Primary | ICD-10-CM

## 2020-11-12 DIAGNOSIS — N64.4 BREAST TENDERNESS IN FEMALE: ICD-10-CM

## 2020-11-12 DIAGNOSIS — F41.9 ANXIETY: ICD-10-CM

## 2020-11-12 DIAGNOSIS — F17.200 TOBACCO USE DISORDER: ICD-10-CM

## 2020-11-12 PROBLEM — Z90.710 HX OF TOTAL HYSTERECTOMY: Status: ACTIVE | Noted: 2019-04-26

## 2020-11-12 PROCEDURE — 99395 PREV VISIT EST AGE 18-39: CPT | Mod: 25 | Performed by: NURSE PRACTITIONER

## 2020-11-12 PROCEDURE — 90471 IMMUNIZATION ADMIN: CPT | Performed by: NURSE PRACTITIONER

## 2020-11-12 PROCEDURE — 90686 IIV4 VACC NO PRSV 0.5 ML IM: CPT | Performed by: NURSE PRACTITIONER

## 2020-11-12 PROCEDURE — 99213 OFFICE O/P EST LOW 20 MIN: CPT | Mod: 25 | Performed by: NURSE PRACTITIONER

## 2020-11-12 ASSESSMENT — ANXIETY QUESTIONNAIRES
6. BECOMING EASILY ANNOYED OR IRRITABLE: NEARLY EVERY DAY
GAD7 TOTAL SCORE: 17
5. BEING SO RESTLESS THAT IT IS HARD TO SIT STILL: SEVERAL DAYS
2. NOT BEING ABLE TO STOP OR CONTROL WORRYING: NEARLY EVERY DAY
IF YOU CHECKED OFF ANY PROBLEMS ON THIS QUESTIONNAIRE, HOW DIFFICULT HAVE THESE PROBLEMS MADE IT FOR YOU TO DO YOUR WORK, TAKE CARE OF THINGS AT HOME, OR GET ALONG WITH OTHER PEOPLE: VERY DIFFICULT
1. FEELING NERVOUS, ANXIOUS, OR ON EDGE: NEARLY EVERY DAY
3. WORRYING TOO MUCH ABOUT DIFFERENT THINGS: NEARLY EVERY DAY
7. FEELING AFRAID AS IF SOMETHING AWFUL MIGHT HAPPEN: NEARLY EVERY DAY

## 2020-11-12 ASSESSMENT — MIFFLIN-ST. JEOR: SCORE: 1479.01

## 2020-11-12 ASSESSMENT — PATIENT HEALTH QUESTIONNAIRE - PHQ9
SUM OF ALL RESPONSES TO PHQ QUESTIONS 1-9: 10
5. POOR APPETITE OR OVEREATING: SEVERAL DAYS

## 2020-11-12 NOTE — PROGRESS NOTES
SUBJECTIVE:   CC: Shanell Alfaro is an 35 year old woman who presents for preventive health visit.       Patient has been advised of split billing requirements and indicates understanding: Yes   Patient would still like to discuss the following concern(s):  1. Mental health- anxiety. Not panic attacks, sleeping well, thinks its just due to COVID and work issues. Has been speaking with her therapist who recommended she get on zoloft or celexa.   2. Mammogram - hx of abnormal mammogram requiring biopsy.  Never had follow up imaging done as recommended.  She does report breast tenderness.     Healthy Habits:     Getting at least 3 servings of Calcium per day:  Yes    Bi-annual eye exam:  NO    Dental care twice a year:  NO    Sleep apnea or symptoms of sleep apnea:  Daytime drowsiness    Diet:  Regular (no restrictions)    Frequency of exercise:  1 day/week    Duration of exercise:  Less than 15 minutes    Taking medications regularly:  Yes    Medication side effects:  Not applicable    PHQ-2 Total Score: 2    Additional concerns today:  No      Today's PHQ-2 Score:   PHQ-2 ( 1999 Pfizer) 11/12/2020   Q1: Little interest or pleasure in doing things 1   Q2: Feeling down, depressed or hopeless 2   PHQ-2 Score 3   Q1: Little interest or pleasure in doing things -   Q2: Feeling down, depressed or hopeless -   PHQ-2 Score -       Abuse: Current or Past (Physical, Sexual or Emotional) - No  Do you feel safe in your environment? Yes        Social History     Tobacco Use     Smoking status: Current Every Day Smoker     Packs/day: 0.50     Years: 5.00     Pack years: 2.50     Types: Cigarettes     Smokeless tobacco: Never Used   Substance Use Topics     Alcohol use: Yes     Comment: social         Alcohol Use 11/11/2020   Prescreen: >3 drinks/day or >7 drinks/week? No   Prescreen: >3 drinks/day or >7 drinks/week? -       Reviewed orders with patient.  Reviewed health maintenance and updated orders accordingly - Yes  Lab  work is in process  Labs reviewed in EPIC  BP Readings from Last 3 Encounters:   11/12/20 126/77   06/16/20 116/78   12/20/19 110/75    Wt Readings from Last 3 Encounters:   11/12/20 75.8 kg (167 lb)   06/16/20 77.1 kg (170 lb)   12/20/19 76.2 kg (168 lb)                  Patient Active Problem List   Diagnosis     Anxiety     CARDIOVASCULAR SCREENING; LDL GOAL LESS THAN 160     Tobacco use disorder     Endometriosis     Uterine leiomyoma, unspecified location     Adjustment disorder with mixed anxiety and depressed mood     Hx of total hysterectomy     Lipoma of skin and subcutaneous tissue     Hx of breast lump     Past Surgical History:   Procedure Laterality Date     ENT SURGERY       LAPAROSCOPIC HYSTERECTOMY TOTAL N/A 4/10/2019    Procedure: TOTAL LAPAROSCOPIC Vaginal HYSTERECTOMY;  Surgeon: Daya Queen MD;  Location: [facility]     NOSE SURGERY  2005    deviated septum     WRIST SURGERY  2007    torn ligament       Social History     Tobacco Use     Smoking status: Current Every Day Smoker     Packs/day: 0.50     Years: 5.00     Pack years: 2.50     Types: Cigarettes     Smokeless tobacco: Never Used   Substance Use Topics     Alcohol use: Yes     Comment: social     Family History   Problem Relation Age of Onset     Depression Mother      Anxiety Disorder Mother      Heart Failure Father         mi     Diabetes Father      Hypertension Father      Hyperlipidemia Father      Breast Cancer Maternal Grandmother 70     Heart Failure Maternal Grandfather      Other Cancer Paternal Grandmother         lung     Heart Failure Paternal Grandfather      Depression Sister      Anxiety Disorder Sister      Depression Sister      Depression Sister          Current Outpatient Medications   Medication Sig Dispense Refill     sertraline (ZOLOFT) 50 MG tablet Take 0.5 tablets (25 mg) by mouth daily for 7 days, THEN 1 tablet (50 mg) daily for 7 days, THEN 2 tablets (100 mg) daily. 71 tablet 1     Allergies   Allergen  Reactions     Erythromycin Nausea and Vomiting     Recent Labs   Lab Test 11/08/19  0923 04/17/19 01/22/19  1224 03/27/18  1421 06/09/15  1030 06/09/15  1030   A1C  --   --   --  5.0  --   --    *  --   --  136*  --  77   HDL 46*  --   --  57  --  52   TRIG 77  --   --  133  --  72   ALT  --  17  --   --   --   --    CR  --  0.72  --   --   --   --    GFRESTIMATED  --  >60  --   --   --   --    GFRESTBLACK  --  >60  --   --   --   --    POTASSIUM  --  4.2  --   --   --   --    TSH 1.19  --  1.24 1.33   < >  --     < > = values in this interval not displayed.        Alternate mammogram schedule due to breast symptoms.     Pertinent mammograms are reviewed under the imaging tab.  History of abnormal Pap smear: Status post benign hysterectomy. Health Maintenance and Surgical History updated.  PAP / HPV Latest Ref Rng & Units 3/27/2018 6/9/2015   PAP - NIL NIL   HPV 16 DNA NEG:Negative Negative Negative   HPV 18 DNA NEG:Negative Negative Negative   OTHER HR HPV NEG:Negative Negative Negative     Reviewed and updated as needed this visit by clinical staff  Tobacco  Allergies  Meds   Med Hx  Surg Hx  Fam Hx  Soc Hx        Reviewed and updated as needed this visit by Provider                Past Medical History:   Diagnosis Date     Anxiety disorder 6/9/2015     Depressive disorder      Major depressive disorder, recurrent episode, mild (H) 6/9/2015      Past Surgical History:   Procedure Laterality Date     ENT SURGERY       LAPAROSCOPIC HYSTERECTOMY TOTAL N/A 4/10/2019    Procedure: TOTAL LAPAROSCOPIC Vaginal HYSTERECTOMY;  Surgeon: Daya Queen MD;  Location: [facility]     NOSE SURGERY  2005    deviated septum     WRIST SURGERY  2007    torn ligament     OB History   No obstetric history on file.       Review of Systems  CONSTITUTIONAL: NEGATIVE for fever, chills, change in weight  INTEGUMENTARY/SKIN: NEGATIVE for worrisome rashes, moles or lesions  EYES: NEGATIVE for vision changes or irritation  ENT:  "NEGATIVE for ear, mouth and throat problems  RESP: NEGATIVE for significant cough or SOB  BREAST: NEGATIVE for masses, tenderness or discharge  CV: NEGATIVE for chest pain, palpitations or peripheral edema  GI: NEGATIVE for nausea, abdominal pain, heartburn, or change in bowel habits  : NEGATIVE for unusual urinary or vaginal symptoms. No vaginal bleeding.  MUSCULOSKELETAL: NEGATIVE for significant arthralgias or myalgia  NEURO: NEGATIVE for weakness, dizziness or paresthesias  ENDOCRINE: NEGATIVE for temperature intolerance, skin/hair changes  HEME/ALLERGY/IMMUNE: NEGATIVE for bleeding problems  PSYCHIATRIC: POSITIVE for anxiety     OBJECTIVE:   /77   Pulse 85   Temp 97.3  F (36.3  C) (Tympanic)   Resp 16   Ht 1.7 m (5' 6.93\")   Wt 75.8 kg (167 lb)   LMP 03/09/2019   SpO2 100%   BMI 26.21 kg/m    Physical Exam  GENERAL APPEARANCE: healthy, alert and no distress  EYES: Eyes grossly normal to inspection, PERRL and conjunctivae and sclerae normal  HENT: ear canals and TM's normal, nose and mouth without ulcers or lesions, oropharynx clear and oral mucous membranes moist  NECK: no adenopathy, no asymmetry, masses, or scars and thyroid normal to palpation  RESP: lungs clear to auscultation - no rales, rhonchi or wheezes  BREAST: deferred per protocol  CV: regular rate and rhythm, normal S1 S2, no S3 or S4, no murmur, click or rub, no peripheral edema and peripheral pulses strong  ABDOMEN: soft, nontender, no hepatosplenomegaly, no masses and bowel sounds normal   (female): deferred, no concerns  MS: no musculoskeletal defects are noted and gait is age appropriate without ataxia, no CVA tenderness  SKIN: no suspicious lesions or rashes  NEURO: Normal strength and tone, cranial nerves intact, sensory exam grossly normal, mentation intact and speech normal  PSYCH: mentation appears normal and affect normal/bright    Diagnostic Test Results:  Labs reviewed in Epic  See orders- pt declining blood labs at " "this time.     ASSESSMENT/PLAN:       ICD-10-CM    1. Encounter for general adult medical examination with abnormal findings  Z00.01    2. Anxiety  F41.9 sertraline (ZOLOFT) 50 MG tablet   3. Needs flu shot  Z23 INFLUENZA VACCINE IM > 6 MONTHS VALENT IIV4 [93340]     ADMIN 1st VACCINE   4. Encounter for screening mammogram for breast cancer  Z12.31 MA Diagnostic Digital Bilateral     US Breast Bilateral Complete 4 Quadrants   5. Breast tenderness in female  N64.4 MA Diagnostic Digital Bilateral     US Breast Bilateral Complete 4 Quadrants   6. Tobacco use disorder  F17.200        Patient has been advised of split billing requirements and indicates understanding: Yes  COUNSELING:  Reviewed preventive health counseling, as reflected in patient instructions    Estimated body mass index is 26.21 kg/m  as calculated from the following:    Height as of this encounter: 1.7 m (5' 6.93\").    Weight as of this encounter: 75.8 kg (167 lb).    Weight management plan: Discussed healthy diet and exercise guidelines    She reports that she has been smoking cigarettes. She has a 2.50 pack-year smoking history. She has never used smokeless tobacco.  Tobacco Cessation Action Plan:   Information offered: Patient not interested at this time      Counseling Resources:  ATP IV Guidelines  Pooled Cohorts Equation Calculator  Breast Cancer Risk Calculator  BRCA-Related Cancer Risk Assessment: FHS-7 Tool  FRAX Risk Assessment  ICSI Preventive Guidelines  Dietary Guidelines for Americans, 2010  USDA's MyPlate  ASA Prophylaxis  Lung CA Screening    SE Zabala  [facility] TIMOTHY  "

## 2020-11-12 NOTE — PATIENT INSTRUCTIONS
Let me know how your mood is doing over the next 2-4 weeks; sooner if feeling worse at any time.     Preventive Health Recommendations  Female Ages 26 - 39  Yearly exam:   See your health care provider every year in order to    Review health changes.     Discuss preventive care.      Review your medicines if you your doctor has prescribed any.    Until age 30: Get a Pap test every three years (more often if you have had an abnormal result).    After age 30: Talk to your doctor about whether you should have a Pap test every 3 years or have a Pap test with HPV screening every 5 years.   You do not need a Pap test if your uterus was removed (hysterectomy) and you have not had cancer.  You should be tested each year for STDs (sexually transmitted diseases), if you're at risk.   Talk to your provider about how often to have your cholesterol checked.  If you are at risk for diabetes, you should have a diabetes test (fasting glucose).  Shots: Get a flu shot each year. Get a tetanus shot every 10 years.   Nutrition:     Eat at least 5 servings of fruits and vegetables each day.    Eat whole-grain bread, whole-wheat pasta and brown rice instead of white grains and rice.    Get adequate Calcium and Vitamin D.     Lifestyle    Exercise at least 150 minutes a week (30 minutes a day, 5 days of the week). This will help you control your weight and prevent disease.    Limit alcohol to one drink per day.    No smoking.     Wear sunscreen to prevent skin cancer.    See your dentist every six months for an exam and cleaning.

## 2020-11-13 ASSESSMENT — ANXIETY QUESTIONNAIRES: GAD7 TOTAL SCORE: 17

## 2020-12-09 ASSESSMENT — ANXIETY QUESTIONNAIRES
5. BEING SO RESTLESS THAT IT IS HARD TO SIT STILL: MORE THAN HALF THE DAYS
IF YOU CHECKED OFF ANY PROBLEMS ON THIS QUESTIONNAIRE, HOW DIFFICULT HAVE THESE PROBLEMS MADE IT FOR YOU TO DO YOUR WORK, TAKE CARE OF THINGS AT HOME, OR GET ALONG WITH OTHER PEOPLE: NOT DIFFICULT AT ALL
2. NOT BEING ABLE TO STOP OR CONTROL WORRYING: SEVERAL DAYS
6. BECOMING EASILY ANNOYED OR IRRITABLE: NOT AT ALL
7. FEELING AFRAID AS IF SOMETHING AWFUL MIGHT HAPPEN: NOT AT ALL
1. FEELING NERVOUS, ANXIOUS, OR ON EDGE: SEVERAL DAYS
GAD7 TOTAL SCORE: 5
3. WORRYING TOO MUCH ABOUT DIFFERENT THINGS: SEVERAL DAYS

## 2020-12-09 ASSESSMENT — PATIENT HEALTH QUESTIONNAIRE - PHQ9
SUM OF ALL RESPONSES TO PHQ QUESTIONS 1-9: 2
5. POOR APPETITE OR OVEREATING: NOT AT ALL

## 2020-12-09 NOTE — PROGRESS NOTES
"Shanell Alfaro is a 35 year old female who is being evaluated via a billable telephone visit.      The patient has been notified of following:     \"This telephone visit will be conducted via a call between you and your physician/provider. We have found that certain health care needs can be provided without the need for a physical exam.  This service lets us provide the care you need with a short phone conversation.  If a prescription is necessary we can send it directly to your pharmacy.  If lab work is needed we can place an order for that and you can then stop by our lab to have the test done at a later time.    Telephone visits are billed at different rates depending on your insurance coverage. During this emergency period, for some insurers they may be billed the same as an in-person visit.  Please reach out to your insurance provider with any questions.    If during the course of the call the physician/provider feels a telephone visit is not appropriate, you will not be charged for this service.\"    Patient has given verbal consent for Telephone visit?  Yes    What phone number would you like to be contacted at? [phone]    How would you like to obtain your AVS? Victorino    Subjective     Shanell Alfaro is a 35 year old female who presents via phone visit today for the following health issues:    HPI     Depression and Anxiety Follow-Up    How are you doing with your depression since your last visit? Improved     How are you doing with your anxiety since your last visit?  Improved but more to go- having sexual side effects.  Advised this could improve, but it may not.  Will send information via [employer] re: antidepressants and sexual side effects, potential options if does not improve and discussing it with her therapist as well.     Are you having other symptoms that might be associated with depression or anxiety? No    Have you had a significant life event? No     Do you have any concerns with " your use of alcohol or other drugs? No    Social History     Tobacco Use     Smoking status: Current Every Day Smoker     Packs/day: 0.50     Years: 5.00     Pack years: 2.50     Types: Cigarettes     Smokeless tobacco: Never Used   Substance Use Topics     Alcohol use: Yes     Comment: social     Drug use: Never     PHQ 11/8/2019 11/12/2020 12/9/2020   PHQ-9 Total Score 1 10 2   Q9: Thoughts of better off dead/self-harm past 2 weeks Not at all Not at all Not at all     STEPHANIE-7 SCORE 11/8/2019 11/12/2020 12/9/2020   Total Score - - -   Total Score 5 17 5     Last PHQ-9 12/9/2020   1.  Little interest or pleasure in doing things 0   2.  Feeling down, depressed, or hopeless 0   3.  Trouble falling or staying asleep, or sleeping too much 0   4.  Feeling tired or having little energy 1   5.  Poor appetite or overeating 1   6.  Feeling bad about yourself 0   7.  Trouble concentrating 0   8.  Moving slowly or restless 0   Q9: Thoughts of better off dead/self-harm past 2 weeks 0   PHQ-9 Total Score 2   Difficulty at work, home, or with people Not difficult at all     STEPHANIE-7  12/9/2020   1. Feeling nervous, anxious, or on edge 1   2. Not being able to stop or control worrying 1   3. Worrying too much about different things 1   4. Trouble relaxing 0   5. Being so restless that it is hard to sit still 2   6. Becoming easily annoyed or irritable 0   7. Feeling afraid, as if something awful might happen 0   STEPHANIE-7 Total Score 5   If you checked any problems, how difficult have they made it for you to do your work, take care of things at home, or get along with other people? Not difficult at all       Suicide Assessment Five-step Evaluation and Treatment (SAFE-T)      How many servings of fruits and vegetables do you eat daily?  2-3    On average, how many sweetened beverages do you drink each day (Examples: soda, juice, sweet tea, etc.  Do NOT count diet or artificially sweetened beverages)?   2    How many days per week do you  exercise enough to make your heart beat faster? 3 or less    How many minutes a day do you exercise enough to make your heart beat faster? 30 - 60    How many days per week do you miss taking your medication? 0             Review of Systems   Constitutional, HEENT, cardiovascular, pulmonary, GI, , musculoskeletal, neuro, skin, endocrine and psych systems are negative, except as otherwise noted.       Objective          Vitals:  No vitals were obtained today due to virtual visit.    healthy, alert and no distress  PSYCH: Alert and oriented times 3; coherent speech, normal   rate and volume, able to articulate logical thoughts, able   to abstract reason, no tangential thoughts, no hallucinations   or delusions  Her affect is normal  RESP: No cough, no audible wheezing, able to talk in full sentences  Remainder of exam unable to be completed due to telephone visits          Assessment & Plan     Diagnoses and all orders for this visit:    Anxiety  -     sertraline (ZOLOFT) 100 MG tablet; Take 1 tablet (100 mg) by mouth daily    Decreased libido            See Patient Instructions: advised pt to follow up in 6 months, sooner if feeling worse at anytime or if has other healthcare questions or concerns.  Review mychart instructions re: sexual side effects from antidepressants.     Return in about 6 months (around 6/10/2021), or if symptoms worsen or fail to improve.    SE Zabala  [facility]    Phone call duration: 11 minutes

## 2020-12-10 ENCOUNTER — VIRTUAL VISIT (OUTPATIENT)
Dept: FAMILY MEDICINE | Facility: CLINIC | Age: 35
End: 2020-12-10
Payer: COMMERCIAL

## 2020-12-10 DIAGNOSIS — R68.82 DECREASED LIBIDO: ICD-10-CM

## 2020-12-10 DIAGNOSIS — F41.9 ANXIETY: Primary | ICD-10-CM

## 2020-12-10 PROCEDURE — 99214 OFFICE O/P EST MOD 30 MIN: CPT | Mod: TEL | Performed by: NURSE PRACTITIONER

## 2020-12-10 RX ORDER — SERTRALINE HYDROCHLORIDE 100 MG/1
100 TABLET, FILM COATED ORAL DAILY
Qty: 90 TABLET | Refills: 1 | Status: SHIPPED | OUTPATIENT
Start: 2020-12-10 | End: 2021-06-22

## 2020-12-10 ASSESSMENT — ANXIETY QUESTIONNAIRES: GAD7 TOTAL SCORE: 5

## 2020-12-10 NOTE — PATIENT INSTRUCTIONS
"Sexual dysfunction caused by selective serotonin reuptake inhibitors (SSRIs): Management  Authors:Mamie Swartz MD, PhDSection :YESSENIA Santizoeputy :David Padilla MD  Contributor Disclosures  All topics are updated as new evidence becomes available and our peer review process is complete.  Literature review current through: Nov 2020.  This topic last updated: Oct 22, 2019.    INTRODUCTION  Selective serotonin reuptake inhibitors (SSRIs) are first line antidepressants for multiple psychiatric disorders. However, these drugs can interfere with different aspects of sexual functioning, including desire, arousal, and orgasm [1,2]. SSRI-induced sexual dysfunction occurs in both men and women, and can lead to nonadherence.   The frequency of sexual side effects may vary among the different SSRIs. Although it is not known how SSRIs impair sexual functioning, the symptoms appear to be dose dependent [3] and genetic polymorphisms may be involved [4].  Sexual side effects can occur with other classes of antidepressants, including serotonin-norepinephrine reuptake inhibitors, tricyclic antidepressants, and monoamine oxidase inhibitors; however, there are more studies of sexual dysfunction secondary to SSRIs [1,2]. Unipolar major depression can also impair sexual functioning, and treatment with an SSRI can improve satisfaction [3].  This topic reviews the management of sexual dysfunction caused by SSRIs. The pharmacology, administration, and other side effects of SSRIs are discussed separately. (See \"Selective serotonin reuptake inhibitors: Pharmacology, administration, and side effects\".)  INCIDENCE OF SEXUAL DYSFUNCTION  Studies of sexual problems secondary to selective serotonin reuptake inhibitors (SSRIs) often underestimate the problem due to methodologic problems, including absence of baseline assessments and comparison groups, and different definitions of sexual dysfunction " [2,3].   Sexual dysfunction is one of the most common adverse effects of SSRIs [5]. Although the incidence of SSRI-induced sexual dysfunction in different studies ranges widely from approximately 15 to 80 percent [1,3,6,7], the best estimate is that sexual impairment occurs in roughly 50 percent of patients treated with SSRIs:  ?  A 14-week, prospective observational study (Sequenced Treatment Alternatives to Relieve Depression [STAR*D]) of 1473 patients treated with citalopram found that [4]:       Decreased libido occurred in 54 percent     Difficulty achieving orgasm occurred in 36 percent     Among the 574 males, erectile dysfunction occurred in 37 percent    ?  A cross-sectional survey of 704 patients who had started SSRIs or serotonin-norepinephrine reuptake inhibitors estimated that treatment-emergent sexual dysfunction occurred in approximately 50 percent [8].    SSRI-related sexual dysfunction is observed more often in women because they are treated with SSRIs more frequently than men (in a ratio of 2:1) [4].    Although there are rare, anecdotal reports of adverse sexual effects persisting for months or years after discontinuation of SSRI use, there is no compelling evidence that SSRI-induced sexual side effects persist after discontinuation. Based upon our clinical experience, sexual side effects induced by SSRIs resolve soon after the medication is stopped.  ASSESSMENT  Selective serotonin reuptake inhibitors (SSRIs) may worsen pre-existing impairment or cause new onset of dysfunction. We thus suggest that clinicians ask about sexual functioning prior to administering these drugs.  When SSRIs are prescribed, assessment of sexual dysfunction includes questions about different aspects of sexual activity:  ?  Desire (libido)  ?  Frequency of sexual activity  ?  Arousal (lubrication in women and erectile function in men)  ?  Orgasm (delayed orgasm and anorgasmia)    The assessment should also determine the  "severity of sexual dysfunction, including the number of impairments and intensity of each.  Several validated screening or severity questionnaires are available for assessing SSRI-induced sexual dysfunction, including The [place] Sexual Experience Scale and Changes in Sexual Functioning Questionnaire [3,9], as well as the International Index of Erectile Dysfunction (see \"Evaluation of male sexual dysfunction\", section on 'Validated instruments to assess sexual function'). However, these instruments are not part of standard clinical practice and are generally reserved for research settings.   Additional information about evaluating patients with sexual dysfunction is discussed separately. (See \"Overview of sexual dysfunction in women: Epidemiology, risk factors, and evaluation\" and \"Evaluation of male sexual dysfunction\".)  MANAGEMENT  General approach -- We suggest that management of sexual dysfunction caused by selective serotonin reuptake inhibitors (SSRIs) proceed according to the sequence described in the algorithm (algorithm 1) and the sections below. Patients first receive initial treatment and progress through each subsequent step until they respond [3]:  ?  Initial treatment       Watchful waiting; if sexual impairment persists:       Decrease the dose of the SSRI within the therapeutic range.    ?  Treatment resistant patients - Management is determined by the severity of depression and sexual dysfunction:       Ongoing depression - For patients who respond only partially to an SSRI (eg, reduction of baseline symptoms <50 percent) or not at all, we suggest switching to a different antidepressant.       Improved depression but severe sexual dysfunction - For patients who obtain at least moderate relief of depressive symptoms with an SSRI (eg, reduction of baseline symptoms ?50 percent), but suffer severe sexual dysfunction, we suggest switching antidepressants.       Improved depression and mild to moderate " sexual dysfunction - For patients who obtain at least moderate relief of depressive symptoms with an SSRI and whose sexual dysfunction is mild to moderate, we suggest augmenting the SSRI with a second drug.      Each treatment step is described in greater detail in the sections below.  Most treatment strategies and specific treatments have not been compared in head-to-head trials. In addition, the results of some studies are difficult to interpret because it is not clear whether sexual dysfunction began during SSRI treatment and thus represents an adverse effect, or began prior to onset of treatment with SSRIs and represents a symptom of the depressive syndrome or a primary sexual disorder.   Initial treatment of sexual dysfunction -- For depressed patients who receive an SSRI and develop sexual dysfunction, we suggest initially waiting (eg, two to eight weeks) for spontaneous remission of the adverse effect (algorithm 1). If sexual impairment persists, we then decrease the dose within the therapeutic range. Although not well studied, these interventions are frequently the first approach taken by many clinicians and experts [3,9].   For SSRI-induced sexual dysfunction, it appears that watchful waiting alone is typically not effective [5]. A prospective observational study in 156 patients treated with fluoxetine, fluvoxamine, paroxetine, or sertraline found that after waiting four to six months, moderate to complete improvement occurred in only 19 percent [10]. Waiting for patients to adapt to the SSRI may work best for patients with mild sexual dysfunction [11].   By contrast, decreasing the dose for patients with SSRI-induced sexual impairment often appears to be helpful. In a prospective observational study (n = 30 patients) in which the dose was decreased by 50 percent, at least moderate improvement occurred in 77 percent [10]. Reducing the dose may be most feasible for remitted patients whose depression has  "remained stable for at least several weeks or months [3], and for patients taking relatively high doses (eg, escitalopram 30 mg/day or paroxetine 50 mg/day) or suffering additional adverse effects.  However, decreasing the dose may diminish therapeutic effects; thus, the dose should be decreased slowly in small increments and should not be reduced below the minimum therapeutic dose [3]. In addition, patients should be monitored. As an example, sertraline 200 mg per day can be reduced by 25 to 50 mg per day every two to four weeks, with patients interviewed prior to each decrease. The minimum therapeutic dose for sertraline is typically 50 mg per day (table 1). (See \"Selective serotonin reuptake inhibitors: Pharmacology, administration, and side effects\", section on 'Dose'.)  Treatment resistant sexual dysfunction  Overview -- Sexual dysfunction caused by SSRIs often does not respond to either watchful waiting or lower doses [12]. Treatment options for these patients include switching to a different antidepressant, or augmenting the SSRI with a second drug. The choice depends upon the degree of relief from the depressive syndrome and the degree of sexual impairment (algorithm 1):  ?  Ongoing depression - For patients who respond only partially to the SSRI (eg, reduction of baseline depressive symptoms <50 percent) or not at all, we suggest switching to a different antidepressant.     ?  Improved depression but severe sexual dysfunction - For patients who obtain at least moderate relief of depressive symptoms with an SSRI (eg, reduction of baseline symptoms ?50 percent) but suffer severe sexual dysfunction (eg, impairment occurs almost always during sexual activity), we suggest switching to a different antidepressant.    ?  Improved depression and mild to moderate sexual dysfunction - For patients who obtain at least moderate relief of depressive symptoms with an SSRI and whose sexual dysfunction is mild to moderate " "(eg, impairment occurs half the time during sexual activity), we suggest augmenting the SSRI with a second drug:        Males    -  For males with erectile dysfunction, we suggest adjunctive treatment with a phosphodiesterase-5 inhibitor.        Females    -  For females with low libido, we typically suggest add-on bupropion at relatively high doses within the therapeutic range (eg, bupropion extended release 300 mg/day).     -  For women with delayed orgasm or anorgasmia, we suggest a phosphodiesterase-5 inhibitor.     However, a reasonable alternative for both men and women is to switch antidepressants.    Switching antidepressants is often preferable to augmentation because adherence may be better with monotherapy than combination treatment [13]. In addition, monotherapy may cause fewer adverse effects, cost less, and present fewer risks of drug interactions in patients taking medications for other conditions. Conversely, augmentation with a second drug maintains the benefit derived from the SSRI, whereas switching antidepressants may lead to relapse of depression or new adverse effects [11]. No head-to-head trials have compared switching with augmentation.  General information about implementing a switch from one antidepressant to another is discussed separately. (See \"Switching antidepressant medications in adults\", section on 'Switching antidepressant medication'.)  Ongoing depression -- Some patients with SSRI-induced sexual dysfunction may also have ongoing depression, such that they respond only partially to the SSRI (eg, reduction of baseline depressive symptoms <50 percent), or not at all. For these patients, we suggest switching to a non-SSRI antidepressant (eg, bupropion), based upon studies that indicate several antidepressants cause fewer adverse sexual effects than SSRIs (table 2) [1,6,14]. However, a reasonable alternative is to switch to a different SSRI. No head-to-head trials have compared these " strategies.   Multiple options are available for patients who decide to switch antidepressants. In selecting a new drug, the choice is based upon factors such as past treatment history, availability, potential side effects, cost, and patient preference. The different options, listed in order of preference, are as follows:  ?  Bupropion - Several randomized trials have demonstrated that bupropion does not impair sexual functioning and that sexual dysfunction occurs less often with bupropion than SSRIs [15]:        A meta-analysis of patient level data from five randomized trials, lasting 6 to 16 weeks, compared bupropion sustained release, SSRIs (fluoxetine or sertraline), and placebo in 1228 patients with unipolar major depression who did not meet criteria for sexual desire disorder [16]. Treatment-emergent sexual desire disorder occurred in fewer patients who received bupropion than SSRIs (6 versus 17 percent) and was identical with bupropion and placebo (6 percent). The incidence of sexual arousal disorder and orgasmic dysfunction were also less with bupropion than SSRIs, and comparable for bupropion and placebo.          A meta-analysis of patient level data from two randomized trials, each lasting eight weeks, compared bupropion extended release (300 or 450 mg per day), escitalopram (10 or 20 mg per day), and placebo in 830 patients with unipolar major depression and normal sexual functioning [17]. Worsened sexual functioning occurred in fewer patients who received bupropion than escitalopram (20 versus 36 percent), and was comparable with bupropion and placebo (20 and 15 percent).        A network meta-analysis of 37 randomized trials (n >14,000 depressed patients) found that sexual dysfunction occurred less often with bupropion than escitalopram, paroxetine, or sertraline [7].    In addition, observational studies suggest that switching from an SSRI to bupropion can be beneficial. One prospective study in 31  "patients with fluoxetine-induced sexual dysfunction found that after switching from fluoxetine to bupropion, sexual functioning was much or very much improved in 81 percent [18].    Indirect evidence that suggests switching to bupropion may ameliorate SSRI-induced sexual dysfunction includes a randomized trial that found add-on bupropion can treat sexual function induced by SSRIs [19]. In addition, bupropion can treat women with low libido (sexual interest/arousal disorder) that is not induced by SSRIs. (See \"Overview of sexual dysfunction in women: Management\", section on 'Bupropion'.)       The pharmacology, administration, and side effects of bupropion are discussed separately. (See \"Atypical antidepressants: Pharmacology, administration, and side effects\", section on 'Bupropion'.)    ?  Other non-SSRIs - Sexual dysfunction that emerges during treatment with SSRIs may persist despite switching to bupropion. For these patients, we suggest discontinuing bupropion and switching to mirtazapine [5,20]. Evidence supporting the use of mirtazapine includes a meta-analysis of four randomized trials (n = 907 patients with major depression) that compared mirtazapine with SSRIs (fluoxetine, paroxetine, or sertraline), and found that sexual dysfunction was less likely with mirtazapine (odds ratio 0.3, 95% CI 0.1-0.7) [21]. The pharmacology, administration, and side effects of mirtazapine are discussed separately. (See \"Atypical antidepressants: Pharmacology, administration, and side effects\", section on 'Mirtazapine'.)    However, reasonable alternatives to mirtazapine include agomelatine (not available in the [place]), moclobemide (not available in the [place]), and selegiline. A pooled analysis of randomized trials and prospective and retrospective observational studies found that sexual impairment was comparable for agomelatine, moclobemide, selegiline, and placebo [1]. Extra caution is required if patients are " "switched directly from SSRIs to monoamine oxidase inhibitors such as moclobemide or selegiline. (See \"Switching antidepressant medications in adults\", section on 'Switching to or from MAOIs'.)    Another reasonable alternative to mirtazapine is vortioxetine. In one trial, investigators enrolled patients (n = 447) who responded to citalopram, paroxetine, or sertraline for unipolar major depression, but also developed sexual dysfunction as a side effect [22]. Patients were randomly assigned to switch to vortioxetine (10 or 20 mg/day) or escitalopram (10 or 20 mg/day) for eight weeks. Improvement of sexual dysfunction was greater with vortioxetine than escitalopram, and antidepressant efficacy was maintained with each drug. Discontinuation of treatment due to adverse effects with vortioxetine occurred in 9 percent of patients and with escitalopram in 6 percent.       When switching from SSRIs to non-SSRIs to relieve sexual dysfunction, another option is nefazodone [7]. In one study that enrolled male and female patients with sexual impairment due to sertraline, the drug was discontinued for two weeks [23]. Patients whose sexual dysfunction resolved (n = 72) were then randomly assigned to nefazodone (400 mg per day) or sertraline (100 mg per day) for eight weeks; reemergence of sexual dysfunction occurred in fewer patients treated with nefazodone than sertraline (26 versus 76 percent). However, some clinicians avoid nefazodone because of concerns about hepatic toxicity. (See \"Serotonin modulators: Pharmacology, administration, and side effects\", section on 'Side effects'.)    ?  Different SSRI - Switching to a different SSRI (eg, fluoxetine) may ameliorate sexual impairment and appeal to patients who otherwise responded well to an SSRI. However, the degree of sexual dysfunction may be so substantial that patients insist upon switching to a non-SSRI. In addition, few head-to-head studies have compared sexual dysfunction " "among different SSRIs.    Low quality data suggest that the incidence of sexual dysfunction may possibly be greater with escitalopram or paroxetine than fluoxetine and other second-generation antidepressants, but the evidence is considered weak [15]. A network meta-analysis of 37 randomized trials (n >14,000 depressed patients) evaluated the adverse effects of 13 second-generation antidepressants by using results from direct comparisons between the drugs, as well as indirectly comparing drugs through their relative effect with a common comparator (typically placebo) [7]. The incidence of sexual dysfunction was greater with escitalopram than fluoxetine, mirtazapine, and nefazodone, and dysfunction was also greater with paroxetine than fluoxetine, mirtazapine, nefazodone, and venlafaxine. However, the credible intervals were wide; this, along with the indirect comparisons, led the investigators to conclude that they could not precisely estimate the comparative risk of sexual impairment associated with specific antidepressants.    Information about implementing a switch from an SSRI to bupropion, another non-SSRI antidepressant, or a different SSRI is discussed separately. (See \"Switching antidepressant medications in adults\".)  Improved depression but severe sexual dysfunction -- For depressed patients who obtain at least moderate relief with an SSRI (eg, reduction of baseline depressive symptoms ?50 percent), but suffer severe sexual dysfunction (eg, impairment occurs almost always during sexual activity), we suggest switching antidepressants. Multiple options are available for patients who decide to switch antidepressants. In selecting a new drug, the choice is based upon factors such as past treatment history, availability, potential side effects, cost, and patient preference. The specific antidepressants that we use for switching are described in the section immediately above and are listed in order of preference. " (See 'Ongoing depression' above.)  Improved depression and mild to moderate sexual dysfunction -- For patients who obtain at least moderate relief of depressive symptoms with an SSRI (eg, reduction of baseline depressive symptoms ?50 percent), and whose sexual dysfunction is mild to moderate (eg, impairment occurs half the time during sexual activity), we suggest augmenting the SSRI with a second drug. However, a reasonable alternative to augmentation is to switch antidepressants; choosing a new antidepressant is described elsewhere in this topic. (See 'Ongoing depression' above.)  Choosing add-on pharmacotherapy for SSRI-induced sexual dysfunction depends upon the patient s sex:  Males -- For males with SSRI-induced sexual dysfunction, we recommend adjunctive treatment with a phosphodiesterase-5 inhibitor, based upon multiple randomized trials:   ?  A meta-analysis of two trials compared adjunctive sildenafil (50 or 100 mg before sexual activity) with placebo for six or eight weeks in 112 euthymic males with sexual dysfunction caused by ongoing antidepressant treatment (primarily SSRIs), and found that improvement of rating scale scores was greater with sildenafil [2].     The larger of the two trials included 89 patients with remitted unipolar major depression who were continuing to receive antidepressants (largely SSRIs) and were experiencing sexual dysfunction, including low libido (64 percent), erectile dysfunction (87 percent), delayed ejaculation (70 percent), and anorgasmia (21 percent) [24]. Overall (global) sexual dysfunction was much improved or very much improved in more patients who received sildenafil than placebo (55 versus 4 percent). In addition, sexual desire (libido), erectile function, and orgasm function each improved more with sildenafil than placebo. However, headache occurred in four times as many patients who received sildenafil than placebo (40 and 10 percent), flushing occurred in eight  "times as many patients who received sildenafil (17 and 2 percent), transient visual disturbances in twice as many patients with sildenafil (12 and 5 percent), and nasal congestion in six times as many patients with sildenafil (12 and 2 percent). None of the patients suffered a relapse of major depression.    ?  A pooled analysis of 19 trials (lasting 12 weeks) compared tadalafil with placebo in men with erectile dysfunction [25]. In the subgroup (n = 205) who were taking antidepressants (SSRIs or other types), successful intercourse occurred in more patients treated with tadalafil 10 or 20 mg per day, compared with patients who received placebo (54 and 59 versus 29 percent). This finding was consistent with the demonstrated efficacy of tadalafil for the general treatment of erectile dysfunction. In addition, tadalafil was well tolerated in the subgroup taking antidepressants.            ?  A subsequent 12-week randomized trial compared tadalafil (20 mg taken on demand) with placebo in 50 males with SSRI-induced sexual impairment who completed the study (out of 54 enrolled) [26]. Erections and sexual activity improved in more men who received tadalafil than placebo (92 versus 8 percent). In addition, improvement of each domain of sexual functioning (desire, erectile function, orgasm, intercourse satisfaction, and overall satisfaction) was greater with tadalafil than placebo, and active treatment was well tolerated.    It is highly likely that other phosphodiesterase-5 inhibitors (eg, avanafil and vardenafil) are beneficial for males with SSRI-induced sexual dysfunction.  Specific interactions between an SSRI and a phosphodiesterase-5 inhibitor may be determined using the [employer] drug interactions tool ([employer]) included in UpToDate. Information about phosphodiesterase-5 inhibitors is discussed separately in the context of treating sexual dysfunction the general population of males. (See \"Treatment of " "male sexual dysfunction\", section on 'Phosphodiesterase-5 inhibitors'.)  Females -- For females with SSRI-induced sexual dysfunction, add-on pharmacotherapy depends upon the specific sexual impairment:  ?  Low libido - For females with SSRI-induced low libido (sexual interest/arousal disorder), we suggest augmentation with bupropion at relatively high doses within the therapeutic range (eg, 300 mg/day). A four-week randomized trial compared add-on bupropion (sustained release 150 mg twice daily) with placebo in 42 patients who had remitted from unipolar major depression, were continuing to receive an SSRI, and were suffering sexual dysfunction, including low libido; 37 (88 percent) of the patients were women [19]. Improvement of desire and frequency of sexual activity was greater with add-on bupropion. However, irritability occurred nearly three times as often with bupropion than placebo (60 and 23 percent of patients). (A meta-analysis of three trials [n = 482 patients] also found that adjunctive bupropion sustained release (150 mg twice per day) improved sexual dysfunction [2], but the largest trial [n = 227] was retracted [27]).    Two other small, randomized trials found that a relatively low dose of adjunctive bupropion (sustained release 150 mg once daily) was not efficacious for SSRI-induced sexual dysfunction, including libido [28,29]. The pharmacology, administration, and side effects of bupropion are discussed separately. (See \"Atypical antidepressants: Pharmacology, administration, and side effects\", section on 'Bupropion'.)    Several reasonable alternatives to bupropion are available to manage low sexual interest caused by SSRIs. These alternatives include nonpharmacologic interventions as well as medications such as bremelanotide; however, these approaches were studied in the general population of women with low sexual interest, rather than patients with SSRI-related low sexual interest. (See \"Overview " "of sexual dysfunction in women: Management\".)    ?  Orgasmic disorder - For females with SSRI-induced orgasmic disorder, we suggest pharmacotherapy or nonpharmacologic interventions, depending upon availability and patient preference. Nonpharmacologic approaches are discussed separately. (See \"Treatment of female orgasmic disorder\", section on 'Psychosocial interventions' and \"Overview of sexual dysfunction in women: Management\", section on 'Orgasmic disorder'.)    For women who prefer pharmacotherapy, we suggest a phosphodiesterase-5 inhibitor, based upon one randomized trial. An eight-week trial compared sildenafil (50 or 100 mg before sexual activity) with placebo in women (n = 98) with remitted major depression and sexual dysfunction due to ongoing antidepressants (primarily SSRIs) [30]. Overall improvement of sexual dysfunction occurred in more patients who received sildenafil than placebo (72 versus 27 percent). However, the benefit of sildenafil was limited to delayed orgasm and anorgasmia; the drug provided no advantage for sexual desire or arousal (eg, lubrication). In addition, dyspepsia, flushing, nasal congestion, and visual disturbance each occurred more frequently with sildenafil, and 43 percent of the sildenafil patients reported headaches. Using a phosphodiesterase-5 inhibitor for female orgasmic disorder is suggested only for women with SSRI-induced orgasmic disorder, rather than the general population of women with orgasmic disorder. (See \"Treatment of female orgasmic disorder\", section on 'Medication'.)       Specific interactions between an SSRI and another drug such as bupropion or a phosphodiesterase-5 inhibitor may be determined using the [employer] drug interactions tool ([employer]) included in UpToDate.   Treatments with little to no demonstrated benefit -- For sexual side effects caused by SSRIs, we do not suggest augmentation with amantadine, bethanechol, buspirone, " "cyproheptadine, ephedrine, ginkgo biloba, granisetron, mirtazapine, olanzapine, or yohimbine; a systematic review of randomized trials found no benefit with these adjunctive drugs [2]. Similarly, we do not suggest add-on treatment with stimulants such as dextroamphetamine, methylphenidate, and pemoline; although they have been used adjunctively for SSRI-induced sexual dysfunction [9], support for their effectiveness is limited to case reports [11].   In addition, we do not suggest add-on treatment with flibanserin for women who are taking SSRIs and have low libido. There are no published data regarding the use of flibanserin for antidepressant induced low libido, and the benefits of flibanserin for hypoactive sexual desire disorder are modest. (See \"Overview of sexual dysfunction in women: Management\", section on 'Flibanserin'.)      Other options that have been attempted for sexual dysfunction caused by SSRIs include the following, but the evidence supporting their use is poor:  ?  Exercise - A randomized trial compared exercise (30 minutes of moderate strength training and cardiovascular exercise, three times/week) immediately before sexual activity with exercise separate from sexual activity in 52 women who reported antidepressant sexual side effects; nearly half were treated with SSRIs [31]. After three weeks, patients crossed over to the other intervention. Sexual desire and overall sexual function improved more with exercise immediately prior to sexual activity. However, many of the women were not distressed by the antidepressant sexual side effects, and the study was marked by a high rate of attrition (46 percent).     ?  Drug holiday - Although drug holidays (briefly interrupting treatment) may possibly be useful for sexual dysfunction induced by SSRIs other than fluoxetine, which has a relatively long half-life, this approach is not widely used [9]. One observational study of weekend drug holidays found " "that in patients taking paroxetine (n = 10) or sertraline (n = 10), libido, orgasm, and sexual satisfaction improved in half of the patients; however, no benefit occurred in patients taking fluoxetine (n = 10) [32]. Potential problems with drug holidays include the risk of worsening depression and discontinuation syndromes [3], as well as sending the wrong message about adherence (especially during maintenance treatment) [33].     SOCIETY GUIDELINE LINKS  Links to society and government-sponsored guidelines from selected countries and regions around the world are provided separately. (See \"Society guideline links: Depressive disorders\".)  INFORMATION FOR PATIENTS  [facility] offers two types of patient education materials,  The Basics  and  Beyond the Basics.  The Basics patient education pieces are written in plain language, at the 5th to 6th grade reading level, and they answer the four or five key questions a patient might have about a given condition. These articles are best for patients who want a general overview and who prefer short, easy-to-read materials. Beyond the Basics patient education pieces are longer, more sophisticated, and more detailed. These articles are written at the 10th to 12th grade reading level and are best for patients who want in-depth information and are comfortable with some medical jargon.  Here are the patient education articles that are relevant to this topic. We encourage you to print or e-mail these topics to your patients. (You can also locate patient education articles on a variety of subjects by searching on  patient info  and the keyword(s) of interest.)   ?  Beyond the Basics topics (see \"Patient education: Sexual problems in men (Beyond the Basics)\" and \"Patient education: Sexual problems in women (Beyond the Basics)\")    SUMMARY AND RECOMMENDATIONS  ?  Selective serotonin reuptake inhibitors (SSRIs) can cause sexual dysfunction in men and women, including decreased libido, " "decreased arousal, and delayed orgasm and anorgasmia. Among patients receiving SSRIs, the estimated incidence of sexual dysfunction is roughly 50 percent. (See 'Introduction' above and 'Incidence of sexual dysfunction' above.)     ?  Assessment of sexual dysfunction secondary to SSRIs includes questions about desire, frequency of sexual activity, arousal, and orgasm. (See 'Assessment' above and \"Overview of sexual dysfunction in women: Epidemiology, risk factors, and evaluation\" and \"Evaluation of male sexual dysfunction\".)    ?  Our initial approach for patients with SSRI-induced sexual dysfunction is to wait (eg, two to eight weeks) for spontaneous remission of the sexual impairment; if the impairment persists, we decrease the dose within the therapeutic dose range (algorithm 1). (See 'Initial treatment of sexual dysfunction' above.)    ?  Patients with SSRI-induced sexual dysfunction often do not respond to watchful waiting or lower doses. Management of treatment resistant sexual dysfunction is as follows:       For patients who respond only partially to an SSRI (eg, reduction of baseline depressive symptoms <50 percent) or not at all, we suggest switching antidepressants, rather than augmenting the SSRI with a second drug (Grade 2C). This approach addresses both the depressive syndrome and the sexual dysfunction. We typically switch to bupropion; however, reasonable alternatives include mirtazapine, agomelatine, moclobemide, selegiline, and vortioxetine as well as a different SSRI. (See 'Ongoing depression' above.)       For patients who obtain at least moderate relief of depressive symptoms with an SSRI (eg, reduction of baseline symptoms ?50 percent) but suffer severe sexual dysfunction (eg, impairment occurs almost always during sexual activity), we suggest switching antidepressants, rather than augmenting the SSRI with a second drug (Grade 2C). This approach addresses both the depressive syndrome and the " "sexual dysfunction. We typically switch to bupropion. (See 'Improved depression but severe sexual dysfunction' above.)       For patients who obtain at least moderate relief of depressive symptoms with an SSRI and whose sexual dysfunction is mild to moderate (eg, impairment occurs half the time during sexual activity), we suggest augmenting the SSRI with a second drug to manage the sexual dysfunction:     -  We use add-on treatment with a phosphodiesterase-5 inhibitor for men, similar to the general population of men with sexual dysfunction. (See \"Treatment of male sexual dysfunction\", section on 'Phosphodiesterase-5 inhibitors'.)     -  For women with low libido, we suggest add-on bupropion at relatively high doses within the therapeutic range, rather than other drugs (Grade 2C). For women with delayed orgasm or anorgasmia, we suggest add-on treatment with a phosphodiesterase-5 inhibitor rather than other drugs (Grade 2C).     However, a reasonable alternative to augmentation for both men and women is switching antidepressants. (See 'Improved depression and mild to moderate sexual dysfunction' above.)  Use of UpToDate is subject to the Subscription and License Agreement.  "

## 2021-01-29 ENCOUNTER — ANCILLARY PROCEDURE (OUTPATIENT)
Dept: ULTRASOUND IMAGING | Facility: CLINIC | Age: 36
End: 2021-01-29
Attending: NURSE PRACTITIONER
Payer: COMMERCIAL

## 2021-01-29 ENCOUNTER — ANCILLARY PROCEDURE (OUTPATIENT)
Dept: MAMMOGRAPHY | Facility: CLINIC | Age: 36
End: 2021-01-29
Attending: NURSE PRACTITIONER
Payer: COMMERCIAL

## 2021-01-29 DIAGNOSIS — Z12.31 ENCOUNTER FOR SCREENING MAMMOGRAM FOR BREAST CANCER: ICD-10-CM

## 2021-01-29 DIAGNOSIS — N64.4 BREAST TENDERNESS IN FEMALE: ICD-10-CM

## 2021-01-29 PROCEDURE — 77066 DX MAMMO INCL CAD BI: CPT | Performed by: RADIOLOGY

## 2021-01-29 PROCEDURE — 76642 ULTRASOUND BREAST LIMITED: CPT | Mod: LT | Performed by: RADIOLOGY

## 2021-01-29 PROCEDURE — G0279 TOMOSYNTHESIS, MAMMO: HCPCS | Performed by: RADIOLOGY

## 2021-01-29 NOTE — RESULT ENCOUNTER NOTE
Justino Reece,    Thank you for your recent office visit.    Here are your recent results.  Per radiologist- RECOMMENDED FOLLOW-UP: Annual Mammography Beginning at Age 40.  Recommend continued clinical follow-up for area of palpable concern.  Patient advised to return if she feels the area is enlarging.    Feel free to contact me via [employer] or call the clinic at [phone].    Sincerely,    Lisette Plata, LESLY, FNP-BC

## 2021-01-29 NOTE — LETTER
Shanell Alfaro  [id number] [facility] [address]              January 29, 2021  Date of Exam: 01/29/2021    Dear Shanell:    Thank you for your recent visit.  Breast Imaging Result: We are pleased to inform you that the results of your recent breast imaging show no evidence of malignancy (cancer).     If you are experiencing any breast problems such as a lump or localized pain we request that you discuss this with your health care team if you haven t already done so, as additional testing may be necessary.    As you know, early detection of cancer is very important. Although not all cancers are found through breast imaging, it is the most accurate method for early detection. A thorough examination includes a combination of breast imaging, physical examination and breast self-examination. Currently the American College of Radiology and the Society of Breast Imaging recommend breast imaging beginning at the age of 40.    A report of your breast imaging results was sent to: Lisette Plata    Your breast imaging will become part of your medical file here at [facility] for at least 10 years. You are responsible for informing any new health care team or breast imaging facility of the date and location of this examination.    We appreciate the opportunity to participate in your health care.    Sincerely,  Dr. Arnel PERRY [facility]

## 2021-01-29 NOTE — RESULT ENCOUNTER NOTE
Justino Reece,    Thank you for your recent office visit.    Here are your recent results.  Per radiologist- RECOMMENDED FOLLOW-UP: Annual Mammography Beginning at Age 40.  Recommend continued clinical follow-up for area of palpable concern.  Patient advised to return if she feels the area is enlarging.       Feel free to contact me via [employer] or call the clinic at [phone].    Sincerely,    Lisette Plata, LESLY, FNP-BC

## 2021-07-06 DIAGNOSIS — F41.9 ANXIETY: ICD-10-CM

## 2021-07-07 RX ORDER — SERTRALINE HYDROCHLORIDE 100 MG/1
100 TABLET, FILM COATED ORAL DAILY
Qty: 30 TABLET | Refills: 0 | OUTPATIENT
Start: 2021-07-07

## 2021-08-30 ENCOUNTER — MYC REFILL (OUTPATIENT)
Dept: FAMILY MEDICINE | Facility: CLINIC | Age: 36
End: 2021-08-30

## 2021-08-30 DIAGNOSIS — F41.9 ANXIETY: ICD-10-CM

## 2021-08-31 ENCOUNTER — MYC REFILL (OUTPATIENT)
Dept: FAMILY MEDICINE | Facility: CLINIC | Age: 36
End: 2021-08-31

## 2021-08-31 DIAGNOSIS — F41.9 ANXIETY: ICD-10-CM

## 2021-08-31 RX ORDER — SERTRALINE HYDROCHLORIDE 100 MG/1
100 TABLET, FILM COATED ORAL DAILY
Qty: 30 TABLET | Refills: 0 | Status: CANCELLED | OUTPATIENT
Start: 2021-08-31

## 2021-09-01 RX ORDER — SERTRALINE HYDROCHLORIDE 100 MG/1
100 TABLET, FILM COATED ORAL DAILY
Qty: 30 TABLET | Refills: 0 | Status: SHIPPED | OUTPATIENT
Start: 2021-09-01 | End: 2021-11-08

## 2021-09-01 NOTE — TELEPHONE ENCOUNTER
Routing refill request to provider for review/approval because:  Needs provider approval. Pended. Possible break in medication  Raiza Wan RN  [facility]

## 2021-09-25 ENCOUNTER — HEALTH MAINTENANCE LETTER (OUTPATIENT)
Age: 36
End: 2021-09-25

## 2021-12-10 NOTE — PATIENT INSTRUCTIONS
Preventive Health Recommendations  Female Ages 26 - 39  Yearly exam:   See your health care provider every year in order to    Review health changes.     Discuss preventive care.      Review your medicines if you your doctor has prescribed any.    Until age 30: Get a Pap test every three years (more often if you have had an abnormal result).    After age 30: Talk to your doctor about whether you should have a Pap test every 3 years or have a Pap test with HPV screening every 5 years.   You do not need a Pap test if your uterus was removed (hysterectomy) and you have not had cancer.  You should be tested each year for STDs (sexually transmitted diseases), if you're at risk.   Talk to your provider about how often to have your cholesterol checked.  If you are at risk for diabetes, you should have a diabetes test (fasting glucose).  Shots: Get a flu shot each year. Get a tetanus shot every 10 years.   Nutrition:     Eat at least 5 servings of fruits and vegetables each day.    Eat whole-grain bread, whole-wheat pasta and brown rice instead of white grains and rice.    Get adequate Calcium and Vitamin D.     Lifestyle    Exercise at least 150 minutes a week (30 minutes a day, 5 days of the week). This will help you control your weight and prevent disease.    Limit alcohol to one drink per day.    No smoking.     Wear sunscreen to prevent skin cancer.    See your dentist every six months for an exam and cleaning.  Erythromelalgia  3-minute read    On this page  What is erythromelalgia?  What are the symptoms of erythromelalgia?  What causes erythromelalgia?  How is erythromelalgia diagnosed?  How is erythromelalgia treated?  Related information on [place] websites  What is erythromelalgia?  Erythromelalgia is a rare skin condition that causes a burning pain, heat and red skin, usually on the hands or feet. You can talk to your doctor about the different treatments available. Lifting and cooling the affected limb can  help.    Erythromelalgia is also called Onel's disease or erythermalgia.    What are the symptoms of erythromelalgia?  Flare-ups often begin with a mild itch. The itch gradually changes to burning pain. The painful area becomes red, swollen, warm and sometimes sore to touch.    Erythromelalgia usually affects the hands or feet on both sides of the body. However you can get it in other areas, or just on one side.    The pain can come and go, and is more common in the evening and night. The pain is relieved by cooling the area or elevating it.    Erythromelalgia can be mild, but it can also be very severe. It can be worse in warm weather, or when the hands and feet get hot (for example, wearing socks and shoes). Severe erythromelalgia can cause ongoing pain and affect your quality of life. It can also interfere with walking and other activities.    What causes erythromelalgia?  It is thought erythromelalgia may be caused by changes in the small nerves that control sweating and changes in how blood flow is controlled through the skin.    It can also be caused by too many platelets, which are blood cells that help blood to clot when bleeding, or by conditions that may damage the nerve supply to the blood vessels. Other causes include:    diabetes  lupus  some diseases of the brain and nervous system  polycythaemia vera (too many red blood cells)  How is erythromelalgia diagnosed?  To diagnose erythromelalgia, your doctor will need to know how it looks and feels during an attack. It can help to take photos of your skin during a flare-up, to show your doctor.    Your doctor might also ask you to put your feet or hands in warm water so they can see what happens.    If you have erythromelalgia, you might be asked to have a blood test. This is to see if your erythromelalgia is caused by too many platelets or blood cells.    How is erythromelalgia treated?  Erythromelalgia can't be cured, but it sometimes gets better by  itself.    Many people find gently cooling the area helps. Raising the affected area can also help.    If you have erythromelalgia, wearing loose clothing and not letting yourself get too hot can help prevent or reduce attacks.    Keeping the room at a steady temperature can be helpful. Avoid strenuous exercise or wearing too much clothing. Alcohol can make the symptoms worse.    Other treatments that have helped some people include:    aspirin  capsaicin cream  anticonvulsant medicines (such as gabapentin)  antidepressants (for example, tricyclics)  calcium channel blocker medication  anaesthetic medication through a drip  surgery to stop the nerve pain  Using cold water for long periods (such as in an ice bath) is not recommended as it can cause tissue damage and ulcers.    What works for one person may not work for another. Treatments can also have risks and side effects. Talk to your doctor about what treatment might be best for you.    ---    Erythromelalgia  Author:  Joaquin Bro MD, FAAD  Section :  Thoedore Roy MD, FACP, FAAD  [place] :  Aria Frost MD  Contributor Disclosures  All topics are updated as new evidence becomes available and our peer review process is complete.  Literature review current through: Nov 2021.  This topic last updated: Jan 26, 2021.    INTRODUCTION  Erythromelalgia is a rare, acquired or (very rarely) inherited clinical syndrome of intermittently red, hot, painful extremities (picture 1A-G). The syndrome usually affects the lower extremities (predominantly the feet) but may also involve the upper extremities (predominantly hands) and rarely concomitantly involves the face. Patients instinctively try to relieve symptoms by cooling the involved areas with fans, cold water, or ice.  There is no cure for erythromelalgia; therefore, treatment is focused on improving symptoms. Many patients can be successfully managed with behavioral interventions, topical medications,  "and aspirin. Other interventions may be beneficial when these measures are insufficient.  The epidemiology, clinical features, diagnosis, and management of erythromelalgia will be reviewed here.  EPIDEMIOLOGY  Epidemiologic data on erythromelalgia are limited, but erythromelalgia appears to be rare [1-4]. Population-based studies from the [place], [place], and [place] have found incidence rates of less than 2 per 100,000 people per year [1-3].  Erythromelalgia is more common in women than in men and most often occurs in adults [1,2]. In a population-based study in [place], [place], the age-adjusted incidence rates were 2 (95% CI 1.2-2.7) per 100,000 women and 0.6 (95% CI 0.1-1.1) per 100,000 men [1]. The median age at diagnosis was 61 years (range, 16 to 90 years). Among 27 patients with erythromelalgia identified in a [place] study, the median age was 49 years (interquartile range 34 to 68 years) [2].  Erythromelalgia is exceedingly rare in children. In the largest series reported of pediatric erythromelalgia, which included 32 patients (including 22 females) evaluated at an [facility] between 1970 to 2007, the mean age was 14 years (range, 5 to 18 years) and the mean time to diagnosis was 5.2 years [5].  ASSOCIATED DISEASES  Although originally described in association with myeloproliferative diseases (eg, essential thrombocytosis, chronic myelogenous leukemia, polycythemia vera, myelofibrosis), erythromelalgia appears to be associated with underlying myeloproliferative diseases in less than 10 percent of cases [6]. A diagnosis of myeloproliferative disease may precede, follow, or coincide with the development of erythromelalgia. (See \"Overview of the myeloproliferative neoplasms\" and \"Diagnosis and clinical manifestations of essential thrombocythemia\" and \"Clinical manifestations and diagnosis of polycythemia vera\".)  A variety of other comorbidities (eg, autoimmune diseases, infections, " "neoplasms, neurologic diseases), pregnancy, and drug exposures have been reported in patients with erythromelalgia in case reports. However, the relationship between erythromelalgia and these diseases is unclear.  TERMINOLOGY  Some authors have used \"primary erythromelalgia\" to refer to idiopathic or inherited erythromelalgia and \"secondary erythromelalgia\" to refer to erythromelalgia that occurs in the setting of an underlying disease associated with erythromelalgia. However, the use of these terms is debated because of the uncertainty regarding the relationship between erythromelalgia and comorbidities other than myeloproliferative disease. Because of this confusion, the author suggests avoidance of the terms \"primary\" or \"secondary\" when referring to erythromelalgia.  PATHOGENESIS  The pathogenesis of erythromelalgia is not fully understood. Vascular, neural, and genetic factors may play roles:  ?Vascular factors - A role for vascular changes in the pathogenesis of erythromelalgia is suggested by the finding of increased blood flow (up to 10-fold increase as measured by laser Doppler) and temperature in affected areas during symptomatic episodes. Paradoxically, transcutaneous oximetry stays unchanged or is decreased [7,8]. Microvascular arteriovenous shunting is a proposed mechanism for a deficit in skin nutritive perfusion and skin hypoxia [9,10].    ?Neuropathy - There appears to be a strong association between erythromelalgia and both large and small fiber neuropathy, suggesting a role for neuropathy in the development of symptoms. Large fiber neuropathy is supported by the finding of abnormal electromyography in up to 60 percent of patients; small fiber neuropathy is supported by the detection of abnormal functional testing of small fibers: abnormal quantitative sudomotor axon reflex tests in up to 80 percent of patients and abnormal thermoregulatory sweat testing in up to 90 percent of patients [7,8,11,12]. " Intriguingly, skin biopsies show decreased epidermal nerve fiber density in only 10 percent, suggesting that the small fiber neuropathy is a functional rather than structural/anatomic neuropathy [13].    ?Genetics - Inherited erythromelalgia (CB #[id number]) is an autosomal dominant disorder that accounts for a small proportion of cases of erythromelalgia [14,15]. In the largest series of consecutive patients with erythromelalgia (n = 168), only six patients (4 percent) had a first-degree relative with erythromelalgia, including three members of the same family [6].    Inherited erythromelalgia is caused by a heterozygous mutation in the SCN9A gene on chromosome 2q24, which encodes a voltage-gated sodium channel. The mutant channels confer hyperexcitability to peripheral sensory and sympathetic neurons, an effect that may contribute to the intense pain associated with erythromelalgia [16,17]. Exonic mutations in SCN9A (NaV1.7) are found in a minority of patients with erythromelalgia [18-20].    CLINICAL MANIFESTATIONS  Characteristic precipitating factors for episodes of erythromelalgia include an increase in ambient heat or exercise.  Signs and symptoms -- During episodes, affected areas (typically distal extremities) become red or violaceous, hot, and painful (picture 1A-G). Swelling occasionally occurs. The episodes are intermittent and generally last for minutes to hours before resolving.  Episodes occur in association with precipitating factors (eg, increase in ambient heat or exercise) and often occur at night (perhaps associated with increased ambient heat in bed). The number of episodes per day is highly variable.  Erythromelalgia affects the feet (eg, toes, soles of the feet, dorsum of the feet, or entire feet) in approximately 90 percent of patients [6]. The fingers, dorsal hands, or entire hands are affected in approximately 25 percent. Occasionally, erythromelalgia extends to involve more proximal portions  "of the extremities. Symptoms are usually symmetrical but may be unilateral or more prominent on one side of the body.  Head and neck involvement (eg, ears, nose, cheeks) is infrequent, occurring in 2 to 3 percent of patients. Whether painful flushing of the face (eg, ears, nose, cheeks) alone can be considered to represent erythromelalgia is debated; we favor a diagnosis of erythromelalgia only if feet and/or hands are also involved [21].  Pain during episodes is most commonly described as a burning sensation. The pain can be extreme and may seem out of proportion to the observed clinical findings [6].  Between episodes, the characteristic features of redness, heat, and pain in affected body areas are absent. However, in up to two-thirds of patients, the affected areas are cool to the touch and purple in color (acrocyanosis) between episodes (picture 2). Features of Raynaud phenomenon also have been reported [22].  Clinical course -- Erythromelalgia usually remains intermittent. Some patients report that symptoms become more frequent and prolonged over months to years. Occasionally, episodes of erythromelalgia become continuous.  Cooling behaviors -- A characteristic feature of erythromelalgia is patient use of cooling techniques to achieve symptom relief. In addition to stopping precipitating actions (eg, stopping exercise, decreasing ambient heat by decreasing room temperature) upon onset of an episode, patients often engage in:  ?Use of fans on affected area  ?Immersion of the affected area in ice water, snow, or equivalents  ?Application of ice packs, bags of frozen vegetables, battery-powered cooling socks or gloves    Potential complications of cooling techniques that may be present in patients with erythromelalgia include \"windburn\" from fans, signs and symptoms of frostbite secondary to overuse of ice (picture 3), Raynaud phenomenon, and acrocyanosis associated with cold exposure. Chronic immersion of the " "feet in water may lead to skin maceration, edema, and ulcerations, also known as \"immersion foot\" or \"trench foot\" [23].  Quality of life -- Erythromelalgia can have a profound negative effect on quality of life. Patients usually avoid precipitating exposures (eg, exercise, warm rooms), which in severe cases can result in becoming housebound, anxious, or depressed secondary to fear of precipitating episodes. Patients with severe symptoms may become wheelchair bound or bed bound [6].  PATHOLOGY  Skin biopsies of affected areas show nonspecific findings [24]. Examples of reported findings include smooth muscle cell hyperplasia and/or swelling in arterioles; thickened, arteriolar basement membranes; mild, perivascular, lymphocytic inflammation; and mild, perivascular edema [24].  DIAGNOSIS  Erythromelalgia is a clinical syndrome. The diagnosis is made based upon recognition of characteristic signs and symptoms (ie, intermittent episodes of red, hot, painful extremities that may be precipitated by heat or exercise and improve with cooling). Asking the patient to take photographs of the affected areas during an episode is helpful for confirming redness in patients who are asymptomatic at the time of clinical evaluation (the majority of patients) [22].  Skin biopsies do not aid in the diagnosis of erythromelalgia [24]; the usefulness of biopsies is limited to ruling out other suspected conditions. There are no serologic tests that confirm the diagnosis.  ADDITIONAL EVALUATION  Given the association of erythromelalgia with myeloproliferative disease, a complete blood count with differential should be obtained to assess for signs of underlying myeloproliferative disease. (See 'Associated diseases' above.)  Additional tests, such as electromyography, nerve conduction velocity, and tests for small fiber neuropathy (eg, quantitative sudomotor axon reflex test or thermoregulatory sweat testing), are often abnormal in patients " "with erythromelalgia, but routine performance of these tests is not essential. The results of these tests do not influence the management of erythromelalgia but provide support for the use of medications for neuropathy in patients with erythromelalgia. (See 'Pathogenesis' above and 'Pharmacologic therapy' below.)  DIFFERENTIAL DIAGNOSIS  The differential diagnosis of erythromelalgia includes other causes of extremity pain, such as large or small fiber neuropathy (which can also be a comorbid condition with erythromelalgia) and peripheral artery disease; other causes of red or discolored extremities, such as lipodermatosclerosis, acrocyanosis, Raynaud phenomenon, and cellulitis; and metabolic diseases, such as Fabry disease. The occurrence of red, hot extremities in conjunction with painful episodes distinguishes erythromelalgia form these diseases.  Of note, acrocyanosis and Raynaud phenomenon can occur in patients with erythromelalgia [22]. In particular, the hyperemic phase of Raynaud phenomenon is characterized by skin blushing and can be mistaken for an episode of erythromelalgia. Moreover, Raynaud phenomenon is often triggered by cold (rather than heat) exposure. (See \"Clinical manifestations and diagnosis of Raynaud phenomenon\".)  MANAGEMENT  There is no cure for erythromelalgia. Therefore, interventions are directed at improving quality of life and reducing symptoms.  Data on treatments for erythromelalgia are primarily limited to case reports and small case series, precluding conclusions regarding the best approach to treatment. Treatment typically involves a combination of nonpharmacologic interventions, management of identified underlying myeloproliferative or neurologic disease, pharmacologic interventions, and patient counseling. Pain rehabilitation programs may be useful for patients with debilitating symptoms. A summary of our typical approach to treatment is provided. (See 'Our " approach' below.)  Nonpharmacologic measures -- Nonpharmacologic measures should be included in the management of patients with erythromelalgia as they can reduce symptoms and may reduce requirements for pharmacologic therapy. In our experience, the following interventions can be helpful for alleviating episodes:  ?Avoidance of precipitating factors (increased ambient heat, exercise) as much as possible while maintaining as normal a lifestyle as possible    ?Exposure of the affected area to cool water for short periods of time (eg, 5 to 10 minutes every one to two hours)    ?Limb elevation    ?Fan use for short periods of time (5 to 10 minutes every one to two hours)    Cooling measures can result in complications if used inappropriately or excessively [23]. Patients should avoid applying ice or very cold water directly to the skin. (See 'Cooling behaviors' above.)  Pharmacologic therapy -- A variety of topical and systemic therapies have been used for erythromelalgia. Efficacy data are limited and insufficient for conclusions on the best approach to treatment and best treatment regimens. Comparative trials have not been performed.  For most patients, nonpharmacologic measures and topical pain-relieving medications improve symptoms, although the response to individual treatments varies widely.  Topical treatments -- Case series and retrospective studies suggest that topical lidocaine (we prefer lidocaine patches since they gradually release lidocaine over a prolonged period [25-27]) and compounded amitriptyline and ketamine [28,29] may improve the pain associated with erythromelalgia. Benefit from topical capsaicin is described in a case report [30]. Other topical treatments that have been suggested, but not formally studied, include nonprescription pain-relieving rubs or patches, diclofenac 1% gel, and gabapentin 6% ointment.  Topical vasoactive medications may be helpful for improving redness, such as  compounded midodrine 0.2% [31]. In theory, topical brimonidine tartrate 0.33% and topical oxymetazoline 0.05% might help.  Systemic treatments -- Systemic therapy also may be beneficial. Aspirin is a common initial treatment that may be particularly useful for patients with myeloproliferative disease based upon data from case reports and case series [32-34]. In patients with myeloproliferative disease, aspirin may relieve symptoms within days. Other systemic therapies are implemented when behavioral interventions, topical therapies, and aspirin are insufficient.  Common treatments described as beneficial in case reports are agents used for neuropathic pain, including gabapentin [35-39], pregabalin [40,41], venlafaxine [42], and oral amitriptyline [43]. Oral misoprostol appeared beneficial in a double-blind, crossover, nonrandomized study in which 21 patients were treated with placebo for six weeks, followed by misoprostol for six weeks [44].  Additional systemic agents that have appeared useful for improving symptoms in small numbers of patients include sertraline [45], sodium channel blockers (mexiletine [46-52], mexiletine with intravenous lidocaine [52], carbamazepine [37,53]), a variety of vasoactive drugs (beta blockers, calcium antagonists [eg, diltiazem] [6,54], sodium nitroprusside [55-58], high-dose oral magnesium [59], and cyproheptadine [60]), and intravenous immunoglobulin [61].  There have been reports of benefit from systemic glucocorticoids in patients with sudden-onset, severe erythromelalgia who were treated within weeks after the onset of symptoms [62]. In a retrospective study in which 17 of 31 patients (55 percent) treated with systemic glucocorticoids for erythromelalgia had improvement of symptoms, the median duration of disease prior to treatment was shorter among responders (3 months [range 3 to 12 months]) compared with nonresponders (24 months [range 17 to 45 months]) [62].  Rarely,  "epidural anesthesia or surgical intervention is used for erythromelalgia. Benefit from epidural bupivacaine hydrochloride [63-65] and thoracic or lumbar sympathectomy is described in case reports [66-71].  Treatment of myeloproliferative disease -- Although rapid responses to aspirin can occur in patients with underlying myeloproliferative disease, the underlying myeloproliferative disease also should be treated if possible. Limited data from case reports and case series suggest that treatment of myeloproliferative disease may contribute to improvement in erythromelalgia [32,33]. (See 'Pharmacologic therapy' above and \"Overview of the myeloproliferative neoplasms\".)  Pain rehabilitation programs -- In our experience, patients with refractory, debilitating pain may benefit from pain rehabilitation programs. Pain rehabilitation programs use a multidisciplinary and behavioral therapy approach to help restore physical activities and improve the quality of life for patients with chronic pain. In an uncontrolled study of eight patients with incapacitating erythromelalgia who underwent a pain rehabilitation program, scores on all measures of physical and emotional functioning improved [72].  Patient counseling -- Fear of precipitating episodes of erythromelalgia may prompt patients to dramatically alter their lifestyles to avoid potential triggers for symptoms. Provided pain is adequately controlled, patients should be encouraged to engage in a normal lifestyle as much as possible. There are no data to show that engaging in precipitating activities will worsen the long-term outcome of erythromelalgia. Many patients can tolerate swimming as a form of exercise that may be less likely to precipitate episodes.  Support groups, such as the Erythromelalgia [employer], can be a valuable resource for patients.  Our approach -- Given the paucity of data on the management of erythromelalgia, various approaches to treatment are " reasonable. Our typical approach for adults with erythromelalgia is as follows:  ?Step 1:     Counseling regarding nonpharmacologic measures to minimize symptoms. (See 'Nonpharmacologic measures' above.)     Aspirin (325 mg per day, discontinue after one month if no response; aspirin may be particularly beneficial in patients with myeloproliferative disease).     Trial of topical therapies:    -Compounded amitriptyline 2% combined with ketamine 0.5% in a Lipoderm base (applied three times daily as needed)    Or    -Compounded midodrine 0.2% cream (applied three times daily as needed)    Or    -Lidocaine patch (up to three patches applied to affected areas for 12 hours every 24-hour period)    Patients should be treated with topical therapies for at least two to four weeks to assess efficacy.     Treatment of underlying myeloproliferative disease.    ?Step 2 (patients who fail to respond adequately to step 1):     Initiation of other systemic treatments:    -Gabapentin (300 mg once daily at bedtime; may be titrated gradually up to a maximum of 2400 mg per day as needed and as tolerated)    Or    -Pregabalin (75 mg twice daily; may be titrated up to 300 mg twice daily as needed and as tolerated)    Or    -Venlafaxine (75 mg per day [in two to three divided doses]; may increase to maximum of 225 mg four times per day as needed and as tolerated)    Patients should be treated with these agents for at least two to four months to assess efficacy.    ?Step 3 (patients who fail to respond adequately to step 1 and step 2):     Consider other medications (eg, amitriptyline [43], misoprostol, sertraline [45], sodium channel blockers [mexiletine [46-52], carbamazepine [37,53]]) (see 'Pharmacologic therapy' above)     Pain rehabilitation (see 'Pain rehabilitation programs' above)    PROGNOSIS  Prognostic data on erythromelalgia are limited. In a review of 168 patients with erythromelalgia, of the 94 patients available for a  follow-up questionnaire (mean follow up 8.7 years [range 1.3 to 20 years]), 30 (32 percent) reported worsening of symptoms, 25 (27 percent) reported stable symptoms, 29 (31 percent) reported decreased symptoms, and 10 (11 percent) had experienced disappearance of symptoms [6]. Mccracken-Sury survival curves revealed a decrease in survival compared with that expected in persons of similar age and of the same sex. Notably, three patients committed suicide.  FOLLOW-UP  The frequency of clinical follow-up for erythromelalgia is dependent upon the response to treatment. Once disease is reasonably controlled, patients can return for evaluation as needed.  Patients with erythromelalgia should be monitored for the development of myeloproliferative disease. We obtain a complete blood count with differential once yearly.  SUMMARY AND RECOMMENDATIONS   ?Erythromelalgia is a rare syndrome characterized by the intermittent occurrence of red, hot, painful extremities (picture 1A-G). Erythromelalgia most often occurs in adults and is more common in women than in men. (See 'Epidemiology' above.)    ?Erythromelalgia is associated with myeloproliferative disease in less than 10 percent of affected patients. A diagnosis of myeloproliferative disease may precede, follow, or occur concurrently with a diagnosis of erythromelalgia. The relationship between erythromelalgia and other systemic diseases is unclear. (See 'Associated diseases' above.)    ?The pathogenesis of erythromelalgia is not well understood. Vascular, neural, and genetic factors may play roles. A small subset of patients with erythromelalgia have inherited erythromelalgia. Inherited erythromelalgia is an autosomal dominant disorder caused by mutations in the SCN9A gene. (See 'Pathogenesis' above.)    ?Increased ambient heat and exercise are common precipitators of episodes of erythromelalgia. During episodes, the affected areas become red, hot, and painful. The feet are the  most common sites of involvement. Symptoms are typically bilateral, and episodes may last from seconds to hours. (See 'Clinical manifestations' above.)    ?The diagnosis of erythromelalgia is made based upon the recognition of the classic signs and symptoms. Photographs of the red extremities are helpful in confirming the history and for documenting the redness (picture 1A-G). The histopathologic findings of erythromelalgia are nonspecific, and no serologic tests confirm the diagnosis. (See 'Diagnosis' above.)    ?Patients with erythromelalgia should be evaluated for myeloproliferative disease. Patients without evidence for myeloproliferative disease at the time of diagnosis should be monitored over time for the development of myeloproliferative disease. (See 'Additional evaluation' above and 'Follow-up' above.)    ?Data on treatment of erythromelalgia are limited, and responses to individual treatments vary widely. We suggest an initial therapeutic approach that includes patient counseling regarding methods to minimize symptoms, topical therapy, and aspirin (Grade 2C). For patients with underlying myeloproliferative disease, we also suggest treatment of the myeloproliferative disease (Grade 2C). (See 'Management' above.)

## 2021-12-10 NOTE — PROGRESS NOTES
SUBJECTIVE:   CC: Shanell Alfaro is an 36 year old woman who presents for preventive health visit.       Patient has been advised of split billing requirements and indicates understanding: Yes  Healthy Habits:     Getting at least 3 servings of Calcium per day:  Yes    Bi-annual eye exam:  NO    Dental care twice a year:  Yes    Sleep apnea or symptoms of sleep apnea:  Daytime drowsiness    Diet:  Regular (no restrictions)    Frequency of exercise:  None    Taking medications regularly:  Yes    Medication side effects:  Not applicable    PHQ-2 Total Score: 2    Additional concerns today:  Yes    Patient would still like to discuss the following concern(s):  1. Hands and feet-swelling, pain, itching- sounds like erythromelalgia. Aspirin helps her.  Gave info on OTC and RX treatment. Advised pt to read about it; let me know if they want medication sent in for this.   2. Menopause - hot flashes. Discussed gabapentin if significant.   3. Relationship issue. 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       Today's PHQ-2 Score:   PHQ-2 ( 1999 [employer]) 12/17/2021   Q1: Little interest or pleasure in doing things 1   Q2: Feeling down, depressed or hopeless 1   PHQ-2 Score 2   PHQ-2 Total Score (12-17 Years)- Positive if 3 or more points; Administer PHQ-A if positive -   Q1: Little interest or pleasure in doing things Several days   Q2: Feeling down, depressed or hopeless Several days   PHQ-2 Score 2       Abuse: Current or Past (Physical, Sexual or Emotional) - No  Do you feel safe in your environment? Yes    Have you ever done Advance Care Planning? (For example, a Health Directive, POLST, or a discussion with a medical provider or your loved ones about your wishes): No, advance care planning information given to patient to review.  Patient declined advance care planning discussion at this time.    Social History     Tobacco Use     Smoking status: Current Every Day Smoker      Packs/day: 0.50     Years: 5.00     Pack years: 2.50     Types: Cigarettes     Start date: 2013     Last attempt to quit: 2021     Years since quittin.0     Smokeless tobacco: Never Used   Substance Use Topics     Alcohol use: Yes     Comment: social         Alcohol Use 2021   Prescreen: >3 drinks/day or >7 drinks/week? No   Prescreen: >3 drinks/day or >7 drinks/week? -       Reviewed orders with patient.  Reviewed health maintenance and updated orders accordingly - Yes  Lab work is in process  Labs reviewed in EPIC  BP Readings from Last 3 Encounters:   21 (!) 147/77   20 126/77   20 116/78    Wt Readings from Last 3 Encounters:   21 83.6 kg (184 lb 6.4 oz)   20 75.8 kg (167 lb)   20 77.1 kg (170 lb)                  Patient Active Problem List   Diagnosis     Anxiety     CARDIOVASCULAR SCREENING; LDL GOAL LESS THAN 160     Tobacco use disorder     Endometriosis     Uterine leiomyoma, unspecified location     Adjustment disorder with mixed anxiety and depressed mood     Hx of total hysterectomy     Lipoma of skin and subcutaneous tissue     Hx of breast lump     Erythromelalgia (H)     Partner relationship problem     Past Surgical History:   Procedure Laterality Date     BIOPSY      Before hysterectomy and breast lump     ENT SURGERY       LAPAROSCOPIC HYSTERECTOMY TOTAL N/A 4/10/2019    Procedure: TOTAL LAPAROSCOPIC Vaginal HYSTERECTOMY;  Surgeon: Daya Queen MD;  Location: [facility] OR     NOSE SURGERY      deviated septum     WRIST SURGERY      torn ligament       Social History     Tobacco Use     Smoking status: Current Every Day Smoker     Packs/day: 0.50     Years: 5.00     Pack years: 2.50     Types: Cigarettes     Start date: 2013     Last attempt to quit: 2021     Years since quittin.0     Smokeless tobacco: Never Used   Substance Use Topics     Alcohol use: Yes     Comment: social     Family History   Problem Relation Age of  Onset     Depression Mother      Anxiety Disorder Mother      Heart Failure Father         mi     Diabetes Father      Hypertension Father      Hyperlipidemia Father      Breast Cancer Maternal Grandmother 70     Heart Failure Maternal Grandfather      Other Cancer Paternal Grandmother         lung     Heart Failure Paternal Grandfather      Depression Sister      Anxiety Disorder Sister      Depression Sister      Depression Sister          Current Outpatient Medications   Medication Sig Dispense Refill     sertraline (ZOLOFT) 100 MG tablet Take 1 tablet (100 mg) by mouth daily 90 tablet 3     Allergies   Allergen Reactions     Erythromycin Nausea and Vomiting     Recent Labs   Lab Test 11/08/19  0923 04/17/19  0000 01/22/19  1224 03/27/18  1421 03/27/18  1421 06/09/15  1030   A1C  --   --   --   --  5.0  --    *  --   --   --  136* 77   HDL 46*  --   --   --  57 52   TRIG 77  --   --   --  133 72   ALT  --  17  --   --   --   --    CR  --  0.72  --   --   --   --    GFRESTIMATED  --  >60  --   --   --   --    GFRESTBLACK  --  >60  --   --   --   --    POTASSIUM  --  4.2  --   --   --   --    TSH 1.19  --  1.24   < > 1.33  --     < > = values in this interval not displayed.        Breast Cancer Screening:  Any new diagnosis of family breast, ovarian, or bowel cancer? No    FHS-7:   Breast CA Risk Assessment (FHS-7) 12/17/2021   Did any of your first-degree relatives have breast or ovarian cancer? No   Did any of your relatives have bilateral breast cancer? Unknown   Did any man in your family have breast cancer? No   Did any woman in your family have breast and ovarian cancer? Yes   Did any woman in your family have breast cancer before age 50 y? No   Do you have 2 or more relatives with breast and/or ovarian cancer? No   Do you have 2 or more relatives with breast and/or bowel cancer? No       Patient under 40 years of age: Routine Mammogram Screening not recommended.   Pertinent mammograms are reviewed  under the imaging tab.    History of abnormal Pap smear: Status post benign hysterectomy. Health Maintenance and Surgical History updated.  PAP / HPV Latest Ref Rng & Units 3/27/2018 6/9/2015   PAP (Historical) - NIL NIL   HPV16 NEG:Negative Negative Negative   HPV18 NEG:Negative Negative Negative   HRHPV NEG:Negative Negative Negative     Reviewed and updated as needed this visit by clinical staff  Tobacco  Allergies  Meds  Problems  Med Hx  Surg Hx  Fam Hx  Soc Hx         Reviewed and updated as needed this visit by Provider  Tobacco  Allergies  Meds  Problems  Med Hx  Surg Hx  Fam Hx        Past Medical History:   Diagnosis Date     Anxiety disorder 6/9/2015     Depressive disorder      Major depressive disorder, recurrent episode, mild (H) 6/9/2015      Past Surgical History:   Procedure Laterality Date     BIOPSY      Before hysterectomy and breast lump     ENT SURGERY       LAPAROSCOPIC HYSTERECTOMY TOTAL N/A 4/10/2019    Procedure: TOTAL LAPAROSCOPIC Vaginal HYSTERECTOMY;  Surgeon: Daya Queen MD;  Location: [facility] OR     NOSE SURGERY  2005    deviated septum     WRIST SURGERY  2007    torn ligament     OB History   No obstetric history on file.       Review of Systems  CONSTITUTIONAL: NEGATIVE for fever, chills, change in weight  INTEGUMENTARY/SKIN: NEGATIVE for worrisome rashes, moles or lesions  EYES: NEGATIVE for vision changes or irritation  ENT: NEGATIVE for ear, mouth and throat problems  RESP: NEGATIVE for significant cough or SOB  BREAST: NEGATIVE for masses, tenderness or discharge  CV: NEGATIVE for chest pain, palpitations or peripheral edema  GI: NEGATIVE for nausea, abdominal pain, heartburn, or change in bowel habits  : NEGATIVE for unusual urinary or vaginal symptoms. No vaginal bleeding.  MUSCULOSKELETAL: NEGATIVE for significant arthralgias or myalgia  NEURO: NEGATIVE for weakness, dizziness or paresthesias  ENDOCRINE: NEGATIVE for temperature intolerance, skin/hair  "changes  HEME/ALLERGY/IMMUNE: NEGATIVE for bleeding problems  PSYCHIATRIC: NEGATIVE for changes in mood or affect      OBJECTIVE:   BP (!) 147/77   Pulse 90   Temp 98.1  F (36.7  C) (Tympanic)   Resp 16   Ht 1.694 m (5' 6.69\")   Wt 83.6 kg (184 lb 6.4 oz)   LMP 03/09/2019   SpO2 98%   BMI 29.15 kg/m    Physical Exam  GENERAL: healthy, alert and no distress  EYES: Eyes grossly normal to inspection, PERRL and conjunctivae and sclerae normal  HENT: ear canals and TM's normal, nose and mouth without ulcers or lesions  NECK: no adenopathy, no asymmetry, masses, or scars and thyroid normal to palpation  RESP: lungs clear to auscultation - no rales, rhonchi or wheezes  BREAST: deferred per guidelines- asymptomatic per pt  CV: regular rate and rhythm, normal S1 S2, no S3 or S4, no murmur, click or rub, no peripheral edema and peripheral pulses strong  ABDOMEN: soft, nontender, no hepatosplenomegaly, no masses and bowel sounds normal   (female): deferred- no concerns  MS: no gross musculoskeletal defects noted, no edema, no CVA tenderness  SKIN: no suspicious lesions or rashes  NEURO: Normal strength and tone, cranial nerves intact, mentation intact and speech normal  PSYCH: mentation appears normal, affect normal/bright  LYMPH: no cervical, supraclavicular, axillary adenopathy    Diagnostic Test Results:  Labs reviewed in Epic  See orders    ASSESSMENT/PLAN:       ICD-10-CM    1. Routine general medical examination at a health care facility  Z00.00    2. Advance care planning  Z71.89    3. Erythromelalgia (H)  I73.81    4. Anxiety  F41.9 sertraline (ZOLOFT) 100 MG tablet   5. Tobacco use disorder  F17.200    6. Partner relationship problem  Z63.0        Patient has been advised of split billing requirements and indicates understanding: Yes  COUNSELING:  Reviewed preventive health counseling, as reflected in patient instructions    Estimated body mass index is 29.15 kg/m  as calculated from the following:    Height " "as of this encounter: 1.694 m (5' 6.69\").    Weight as of this encounter: 83.6 kg (184 lb 6.4 oz).    Weight management plan: Discussed healthy diet and exercise guidelines    She reports that she has been smoking cigarettes. She started smoking about 8 years ago. She has a 2.50 pack-year smoking history. She has never used smokeless tobacco.  Tobacco Cessation Action Plan:   Information offered: Patient not interested at this time      Counseling Resources:  ATP IV Guidelines  Pooled Cohorts Equation Calculator  Breast Cancer Risk Calculator  BRCA-Related Cancer Risk Assessment: FHS-7 Tool  FRAX Risk Assessment  ICSI Preventive Guidelines  Dietary Guidelines for Americans, 2010  [employer] My[employer]ate  ASA Prophylaxis  Lung CA Screening    SE Zabala  [facility] TIMOTHY  Answers for HPI/ROS submitted by the patient on 12/17/2021  If you checked off any problems, how difficult have these problems made it for you to do your work, take care of things at home, or get along with other people?: Somewhat difficult  PHQ9 TOTAL SCORE: 3  STEPHANIE 7 TOTAL SCORE: 2      "

## 2021-12-17 ENCOUNTER — OFFICE VISIT (OUTPATIENT)
Dept: FAMILY MEDICINE | Facility: CLINIC | Age: 36
End: 2021-12-17
Payer: COMMERCIAL

## 2021-12-17 VITALS
DIASTOLIC BLOOD PRESSURE: 77 MMHG | HEIGHT: 67 IN | TEMPERATURE: 98.1 F | HEART RATE: 90 BPM | SYSTOLIC BLOOD PRESSURE: 147 MMHG | BODY MASS INDEX: 28.94 KG/M2 | OXYGEN SATURATION: 98 % | RESPIRATION RATE: 16 BRPM | WEIGHT: 184.4 LBS

## 2021-12-17 DIAGNOSIS — F41.9 ANXIETY: ICD-10-CM

## 2021-12-17 DIAGNOSIS — I73.81 ERYTHROMELALGIA (H): ICD-10-CM

## 2021-12-17 DIAGNOSIS — Z00.00 ROUTINE GENERAL MEDICAL EXAMINATION AT A HEALTH CARE FACILITY: Primary | ICD-10-CM

## 2021-12-17 DIAGNOSIS — Z63.0 PARTNER RELATIONSHIP PROBLEM: ICD-10-CM

## 2021-12-17 DIAGNOSIS — Z71.89 ADVANCE CARE PLANNING: ICD-10-CM

## 2021-12-17 DIAGNOSIS — F17.200 TOBACCO USE DISORDER: ICD-10-CM

## 2021-12-17 PROCEDURE — 90471 IMMUNIZATION ADMIN: CPT | Performed by: NURSE PRACTITIONER

## 2021-12-17 PROCEDURE — 99213 OFFICE O/P EST LOW 20 MIN: CPT | Mod: 25 | Performed by: NURSE PRACTITIONER

## 2021-12-17 PROCEDURE — 90686 IIV4 VACC NO PRSV 0.5 ML IM: CPT | Performed by: NURSE PRACTITIONER

## 2021-12-17 PROCEDURE — 99395 PREV VISIT EST AGE 18-39: CPT | Mod: 25 | Performed by: NURSE PRACTITIONER

## 2021-12-17 RX ORDER — SERTRALINE HYDROCHLORIDE 100 MG/1
100 TABLET, FILM COATED ORAL DAILY
Qty: 90 TABLET | Refills: 3 | Status: SHIPPED | OUTPATIENT
Start: 2021-12-17 | End: 2022-12-27

## 2021-12-17 SDOH — SOCIAL STABILITY - SOCIAL INSECURITY: PROBLEMS IN RELATIONSHIP WITH SPOUSE OR PARTNER: Z63.0

## 2021-12-17 ASSESSMENT — ENCOUNTER SYMPTOMS
WEAKNESS: 0
JOINT SWELLING: 0
HEARTBURN: 0
ARTHRALGIAS: 0
NAUSEA: 0
MYALGIAS: 0
CHILLS: 0
FEVER: 0
DIZZINESS: 0
NERVOUS/ANXIOUS: 0
ABDOMINAL PAIN: 0
SORE THROAT: 0
FREQUENCY: 0
HEMATURIA: 0
PALPITATIONS: 0
DYSURIA: 0
HEMATOCHEZIA: 0
EYE PAIN: 0
BREAST MASS: 1
COUGH: 0
DIARRHEA: 0
SHORTNESS OF BREATH: 0
HEADACHES: 0
CONSTIPATION: 0
PARESTHESIAS: 0

## 2021-12-17 ASSESSMENT — ANXIETY QUESTIONNAIRES
7. FEELING AFRAID AS IF SOMETHING AWFUL MIGHT HAPPEN: SEVERAL DAYS
7. FEELING AFRAID AS IF SOMETHING AWFUL MIGHT HAPPEN: SEVERAL DAYS
3. WORRYING TOO MUCH ABOUT DIFFERENT THINGS: SEVERAL DAYS
5. BEING SO RESTLESS THAT IT IS HARD TO SIT STILL: NOT AT ALL
2. NOT BEING ABLE TO STOP OR CONTROL WORRYING: NOT AT ALL
4. TROUBLE RELAXING: NOT AT ALL
1. FEELING NERVOUS, ANXIOUS, OR ON EDGE: NOT AT ALL
GAD7 TOTAL SCORE: 2
6. BECOMING EASILY ANNOYED OR IRRITABLE: NOT AT ALL

## 2021-12-17 ASSESSMENT — MIFFLIN-ST. JEOR: SCORE: 1549.18

## 2021-12-17 ASSESSMENT — PAIN SCALES - GENERAL: PAINLEVEL: NO PAIN (0)

## 2021-12-17 ASSESSMENT — PATIENT HEALTH QUESTIONNAIRE - PHQ9
10. IF YOU CHECKED OFF ANY PROBLEMS, HOW DIFFICULT HAVE THESE PROBLEMS MADE IT FOR YOU TO DO YOUR WORK, TAKE CARE OF THINGS AT HOME, OR GET ALONG WITH OTHER PEOPLE: SOMEWHAT DIFFICULT
SUM OF ALL RESPONSES TO PHQ QUESTIONS 1-9: 3
SUM OF ALL RESPONSES TO PHQ QUESTIONS 1-9: 3

## 2021-12-18 ASSESSMENT — PATIENT HEALTH QUESTIONNAIRE - PHQ9: SUM OF ALL RESPONSES TO PHQ QUESTIONS 1-9: 3

## 2021-12-18 ASSESSMENT — ANXIETY QUESTIONNAIRES: GAD7 TOTAL SCORE: 2

## 2021-12-23 PROBLEM — Z63.0 PARTNER RELATIONSHIP PROBLEM: Status: ACTIVE | Noted: 2021-12-23

## 2022-09-06 NOTE — TELEPHONE ENCOUNTER
Spoke to Mary this morning regarding the benign findings of a fibroadenoma found during her breast biopsy last week.  We discussed the Radiologist's recommendation of following up with her primary MD if she notices any increase in size of the area otherwise to begin yearly mammogram screenings at the age of 39.  Mary verbalized understanding and did not have any additional questions or concerns at this time.    
English

## 2022-12-23 NOTE — PROGRESS NOTES
Answers for HPI/ROS submitted by the patient on 2022  If you checked off any problems, how difficult have these problems made it for you to do your work, take care of things at home, or get along with other people?: Somewhat difficult  PHQ9 TOTAL SCORE: 5  STEPHANIE 7 TOTAL SCORE: 8       SUBJECTIVE:   CC: Mary is an 37 year old who presents for preventive health visit.   Patient has been advised of split billing requirements and indicates understanding: Yes  Healthy Habits:     Getting at least 3 servings of Calcium per day:  Yes    Bi-annual eye exam:  NO    Dental care twice a year:  Yes    Sleep apnea or symptoms of sleep apnea:  Daytime drowsiness    Diet:  Regular (no restrictions)    Frequency of exercise:  None    Taking medications regularly:  Yes    PHQ-2 Total Score: 1    Additional concerns today:  No    Patient would still like to discuss the followin.) GI issues-has gone to the hospital twice with enteritis, once after surgery and they thought was a bowel obstruction, and once recently.  No known trigger for symptoms.  They gave pain medication and symptoms resolved on their own.  Discussed tips on after visit summary to prevent.  2.) Weight gain-thinks is related to diet and lack of exercise.  Does play softball and is in bowling but does not do regular exercise.  No family history of thyroid disease.    Feels mental health is stable, did not like the way Zoloft made her feel.  She feels she is able to manage her anxiety and depression on her own at this time.  Discussed exercise can help.  She is interested in smoking cessation, discussed patches.  Discussed labs.  She is interested in getting COVID booster, flu shot and hepatitis B shot.      Today's PHQ-2 Score:   PHQ-2 (  [employer]) 2022   Q1: Little interest or pleasure in doing things 1   Q2: Feeling down, depressed or hopeless 0   PHQ-2 Score 1   PHQ-2 Total Score (12-17 Years)- Positive if 3 or more points; Administer PHQ-A if  positive -   Q1: Little interest or pleasure in doing things Several days   Q2: Feeling down, depressed or hopeless Not at all   PHQ-2 Score 1           Social History     Tobacco Use     Smoking status: Every Day     Packs/day: 0.50     Years: 5.00     Pack years: 2.50     Types: Cigarettes     Start date: 2013     Last attempt to quit: 2021     Years since quittin.0     Smokeless tobacco: Never   Substance Use Topics     Alcohol use: Yes     Comment: social         Alcohol Use 2022   Prescreen: >3 drinks/day or >7 drinks/week? Yes   Prescreen: >3 drinks/day or >7 drinks/week? -   AUDIT SCORE  7       Reviewed orders with patient.  Reviewed health maintenance and updated orders accordingly - Yes  Lab work is in process  Labs reviewed in EPIC  BP Readings from Last 3 Encounters:   22 130/70   21 (!) 147/77   20 126/77    Wt Readings from Last 3 Encounters:   22 86.5 kg (190 lb 9.6 oz)   21 83.6 kg (184 lb 6.4 oz)   20 75.8 kg (167 lb)                  Patient Active Problem List   Diagnosis     Anxiety     CARDIOVASCULAR SCREENING; LDL GOAL LESS THAN 160     Tobacco use disorder     Endometriosis     Uterine leiomyoma, unspecified location     Adjustment disorder with mixed anxiety and depressed mood     Hx of total hysterectomy     Lipoma of skin and subcutaneous tissue     Hx of breast lump     Erythromelalgia (H)     Partner relationship problem     Weight gain     Hx of bacterial enteritis     Past Surgical History:   Procedure Laterality Date     BIOPSY      Before hysterectomy and breast lump     ENT SURGERY       LAPAROSCOPIC HYSTERECTOMY TOTAL N/A 4/10/2019    Procedure: TOTAL LAPAROSCOPIC Vaginal HYSTERECTOMY;  Surgeon: Daya Queen MD;  Location: [facility] OR     NOSE SURGERY      deviated septum     WRIST SURGERY      torn ligament       Social History     Tobacco Use     Smoking status: Every Day     Packs/day: 0.50     Years: 5.00     Pack  years: 2.50     Types: Cigarettes     Start date: 2013     Last attempt to quit: 2021     Years since quittin.0     Smokeless tobacco: Never   Substance Use Topics     Alcohol use: Yes     Comment: social     Family History   Problem Relation Age of Onset     Depression Mother      Anxiety Disorder Mother      Heart Failure Father         mi     Diabetes Father      Hypertension Father      Hyperlipidemia Father      Breast Cancer Maternal Grandmother 70     Heart Failure Maternal Grandfather      Other Cancer Paternal Grandmother         lung     Heart Failure Paternal Grandfather      Depression Sister      Anxiety Disorder Sister      Depression Sister      Depression Sister          Current Outpatient Medications   Medication Sig Dispense Refill     nicotine (NICODERM CQ) 14 MG/24HR 24 hr patch Place 1 patch onto the skin every 24 hours Use first 14 patch 0     nicotine (NICODERM CQ) 7 MG/24HR 24 hr patch Place 1 patch onto the skin every 24 hours Use second 90 patch 1     Allergies   Allergen Reactions     Erythromycin Nausea and Vomiting     Recent Labs   Lab Test 19  0923 19  0000 19  1224 18  1421 18  1421 06/09/15  1030   A1C  --   --   --   --  5.0  --    *  --   --   --  136* 77   HDL 46*  --   --   --  57 52   TRIG 77  --   --   --  133 72   ALT  --  17  --   --   --   --    CR  --  0.72  --   --   --   --    GFRESTIMATED  --  >60  --   --   --   --    GFRESTBLACK  --  >60  --   --   --   --    POTASSIUM  --  4.2  --   --   --   --    TSH 1.19  --  1.24   < > 1.33  --     < > = values in this interval not displayed.        Breast Cancer Screening:  Any new diagnosis of family breast, ovarian, or bowel cancer? No    FHS-7:   Breast CA Risk Assessment (FHS-7) 2021   Did any of your first-degree relatives have breast or ovarian cancer? No No   Did any of your relatives have bilateral breast cancer? Unknown Unknown   Did any man in your  family have breast cancer? No No   Did any woman in your family have breast and ovarian cancer? Yes Unknown   Did any woman in your family have breast cancer before age 50 y? No No   Do you have 2 or more relatives with breast and/or ovarian cancer? No Unknown   Do you have 2 or more relatives with breast and/or bowel cancer? No Unknown       Patient under 40 years of age: Routine Mammogram Screening not recommended.   Pertinent mammograms are reviewed under the imaging tab.    History of abnormal Pap smear: Status post benign hysterectomy. Health Maintenance and Surgical History updated.  PAP / HPV Latest Ref Rng & Units 3/27/2018 6/9/2015   PAP (Historical) - NIL NIL   HPV16 NEG:Negative Negative Negative   HPV18 NEG:Negative Negative Negative   HRHPV NEG:Negative Negative Negative     Reviewed and updated as needed this visit by clinical staff   Tobacco  Allergies  Meds  Problems  Med Hx  Surg Hx  Fam Hx          Reviewed and updated as needed this visit by Provider   Tobacco  Allergies  Meds  Problems  Med Hx  Surg Hx  Fam Hx         Past Medical History:   Diagnosis Date     Anxiety disorder 6/9/2015     Depressive disorder      Major depressive disorder, recurrent episode, mild (H) 6/9/2015      Past Surgical History:   Procedure Laterality Date     BIOPSY      Before hysterectomy and breast lump     ENT SURGERY       LAPAROSCOPIC HYSTERECTOMY TOTAL N/A 4/10/2019    Procedure: TOTAL LAPAROSCOPIC Vaginal HYSTERECTOMY;  Surgeon: Daya Queen MD;  Location: [facility]     NOSE SURGERY  2005    deviated septum     WRIST SURGERY  2007    torn ligament     OB History   No obstetric history on file.       Review of Systems   Constitutional: Negative for chills and fever.   HENT: Negative for congestion, ear pain, hearing loss and sore throat.    Eyes: Negative for pain and visual disturbance.   Respiratory: Negative for cough and shortness of breath.    Cardiovascular: Negative for chest pain,  "palpitations and peripheral edema.   Gastrointestinal: Negative for abdominal pain, constipation, diarrhea, heartburn, hematochezia and nausea.   Breasts:  Positive for tenderness and breast mass. Negative for discharge.   Genitourinary: Negative for dysuria, frequency, genital sores, hematuria, pelvic pain, urgency, vaginal bleeding and vaginal discharge.   Musculoskeletal: Negative for arthralgias, joint swelling and myalgias.   Skin: Negative for rash.   Neurological: Negative for dizziness, weakness, headaches and paresthesias.   Psychiatric/Behavioral: Negative for mood changes. The patient is nervous/anxious.        OBJECTIVE:   /70   Pulse 114   Temp 97.9  F (36.6  C) (Tympanic)   Resp 20   Ht 1.699 m (5' 6.89\")   Wt 86.5 kg (190 lb 9.6 oz)   LMP 03/09/2019   SpO2 98%   BMI 29.95 kg/m    Physical Exam  GENERAL APPEARANCE: healthy, alert and no distress  EYES: Eyes grossly normal to inspection, PERRL and conjunctivae and sclerae normal  HENT: ear canals and TM's normal, nose and mouth without ulcers or lesions, oropharynx clear and oral mucous membranes moist  NECK: no adenopathy, no asymmetry, masses, or scars and thyroid normal to palpation  RESP: lungs clear to auscultation - no rales, rhonchi or wheezes  BREAST: Deferred per guidelines-she reports history of mammogram with benign breast lump.  Discussed mammogram if symptoms change.  CV: regular rate and rhythm, normal S1 S2, no S3 or S4, no murmur, click or rub, no peripheral edema and peripheral pulses strong  ABDOMEN: soft, nontender, no hepatosplenomegaly, no masses and bowel sounds normal   (female): Deferred per patient, no concerns  MS: no musculoskeletal defects are noted and gait is age appropriate without ataxia, no CVA tenderness  SKIN: no suspicious lesions or rashes  NEURO: Normal strength and tone, cranial nerves intact, sensory exam grossly normal, mentation intact and speech normal  PSYCH: mentation appears normal and affect " normal/bright    Diagnostic Test Results:  Labs reviewed in Epic  See orders    ASSESSMENT/PLAN:       ICD-10-CM    1. Routine general medical examination at a health care facility  Z00.00       2. Need for hepatitis C screening test  Z11.59 Hepatitis C Screen Reflex to HCV RNA Quant and Genotype     Hepatitis C Screen Reflex to HCV RNA Quant and Genotype      3. Encounter for smoking cessation counseling  Z71.6 nicotine (NICODERM CQ) 14 MG/24HR 24 hr patch     nicotine (NICODERM CQ) 7 MG/24HR 24 hr patch      4. Weight gain  R63.5 TSH with free T4 reflex     Thyroid peroxidase antibody     Thyroglobulin and Antibody Reflex     TSH with free T4 reflex     Thyroid peroxidase antibody     Thyroglobulin and Antibody Reflex      5. Lipid screening  Z13.220 Lipid panel reflex to direct LDL Fasting     Lipid panel reflex to direct LDL Fasting      6. Screening for diabetes mellitus  Z13.1 Glucose     Glucose      7. Hx of bacterial enteritis  Z86.19           Patient has been advised of split billing requirements and indicates understanding: Yes      COUNSELING:  Reviewed preventive health counseling, as reflected in patient instructions        She reports that she has been smoking cigarettes. She started smoking about 9 years ago. She has a 2.50 pack-year smoking history. She has never used smokeless tobacco.  Nicotine/Tobacco Cessation Plan:   Pharmacotherapies : Nicotine patch  Self help information given to patient          SE WASSERMAN  [facility]

## 2022-12-26 ENCOUNTER — HEALTH MAINTENANCE LETTER (OUTPATIENT)
Age: 37
End: 2022-12-26

## 2022-12-26 ASSESSMENT — PATIENT HEALTH QUESTIONNAIRE - PHQ9
SUM OF ALL RESPONSES TO PHQ QUESTIONS 1-9: 5
SUM OF ALL RESPONSES TO PHQ QUESTIONS 1-9: 5
10. IF YOU CHECKED OFF ANY PROBLEMS, HOW DIFFICULT HAVE THESE PROBLEMS MADE IT FOR YOU TO DO YOUR WORK, TAKE CARE OF THINGS AT HOME, OR GET ALONG WITH OTHER PEOPLE: SOMEWHAT DIFFICULT

## 2022-12-26 ASSESSMENT — ENCOUNTER SYMPTOMS
SORE THROAT: 0
EYE PAIN: 0
ARTHRALGIAS: 0
HEARTBURN: 0
BREAST MASS: 1
ABDOMINAL PAIN: 0
NERVOUS/ANXIOUS: 1
DYSURIA: 0
NAUSEA: 0
PALPITATIONS: 0
JOINT SWELLING: 0
HEADACHES: 0
WEAKNESS: 0
FREQUENCY: 0
PARESTHESIAS: 0
CHILLS: 0
MYALGIAS: 0
HEMATURIA: 0
DIZZINESS: 0
CONSTIPATION: 0
DIARRHEA: 0
COUGH: 0
SHORTNESS OF BREATH: 0
HEMATOCHEZIA: 0
FEVER: 0

## 2022-12-27 ENCOUNTER — OFFICE VISIT (OUTPATIENT)
Dept: FAMILY MEDICINE | Facility: CLINIC | Age: 37
End: 2022-12-27
Payer: COMMERCIAL

## 2022-12-27 VITALS
BODY MASS INDEX: 29.91 KG/M2 | TEMPERATURE: 97.9 F | HEIGHT: 67 IN | RESPIRATION RATE: 20 BRPM | DIASTOLIC BLOOD PRESSURE: 70 MMHG | OXYGEN SATURATION: 98 % | WEIGHT: 190.6 LBS | HEART RATE: 114 BPM | SYSTOLIC BLOOD PRESSURE: 130 MMHG

## 2022-12-27 DIAGNOSIS — Z13.220 LIPID SCREENING: ICD-10-CM

## 2022-12-27 DIAGNOSIS — Z11.59 NEED FOR HEPATITIS C SCREENING TEST: ICD-10-CM

## 2022-12-27 DIAGNOSIS — Z13.1 SCREENING FOR DIABETES MELLITUS: ICD-10-CM

## 2022-12-27 DIAGNOSIS — Z00.00 ROUTINE GENERAL MEDICAL EXAMINATION AT A HEALTH CARE FACILITY: Primary | ICD-10-CM

## 2022-12-27 DIAGNOSIS — R63.5 WEIGHT GAIN: ICD-10-CM

## 2022-12-27 DIAGNOSIS — Z71.6 ENCOUNTER FOR SMOKING CESSATION COUNSELING: ICD-10-CM

## 2022-12-27 DIAGNOSIS — Z86.19: ICD-10-CM

## 2022-12-27 LAB
CHOLEST SERPL-MCNC: 231 MG/DL
FASTING STATUS PATIENT QL REPORTED: NO
FASTING STATUS PATIENT QL REPORTED: NO
GLUCOSE BLD-MCNC: 117 MG/DL (ref 70–99)
HDLC SERPL-MCNC: 51 MG/DL
LDLC SERPL CALC-MCNC: 141 MG/DL
NONHDLC SERPL-MCNC: 180 MG/DL
TRIGL SERPL-MCNC: 196 MG/DL
TSH SERPL DL<=0.005 MIU/L-ACNC: 2.39 MU/L (ref 0.4–4)

## 2022-12-27 PROCEDURE — 80061 LIPID PANEL: CPT | Performed by: NURSE PRACTITIONER

## 2022-12-27 PROCEDURE — 86803 HEPATITIS C AB TEST: CPT | Performed by: NURSE PRACTITIONER

## 2022-12-27 PROCEDURE — 82947 ASSAY GLUCOSE BLOOD QUANT: CPT | Performed by: NURSE PRACTITIONER

## 2022-12-27 PROCEDURE — 84432 ASSAY OF THYROGLOBULIN: CPT | Performed by: NURSE PRACTITIONER

## 2022-12-27 PROCEDURE — 90472 IMMUNIZATION ADMIN EACH ADD: CPT | Performed by: NURSE PRACTITIONER

## 2022-12-27 PROCEDURE — 99213 OFFICE O/P EST LOW 20 MIN: CPT | Mod: 25 | Performed by: NURSE PRACTITIONER

## 2022-12-27 PROCEDURE — 84443 ASSAY THYROID STIM HORMONE: CPT | Performed by: NURSE PRACTITIONER

## 2022-12-27 PROCEDURE — 99395 PREV VISIT EST AGE 18-39: CPT | Mod: 25 | Performed by: NURSE PRACTITIONER

## 2022-12-27 PROCEDURE — 84432 ASSAY OF THYROGLOBULIN: CPT | Mod: 59 | Performed by: NURSE PRACTITIONER

## 2022-12-27 PROCEDURE — 36415 COLL VENOUS BLD VENIPUNCTURE: CPT | Performed by: NURSE PRACTITIONER

## 2022-12-27 PROCEDURE — 90471 IMMUNIZATION ADMIN: CPT | Performed by: NURSE PRACTITIONER

## 2022-12-27 PROCEDURE — 86376 MICROSOMAL ANTIBODY EACH: CPT | Performed by: NURSE PRACTITIONER

## 2022-12-27 PROCEDURE — 91312 COVID-19 VACCINE BIVALENT BOOSTER 12+ (PFIZER): CPT | Performed by: NURSE PRACTITIONER

## 2022-12-27 PROCEDURE — 86800 THYROGLOBULIN ANTIBODY: CPT | Performed by: NURSE PRACTITIONER

## 2022-12-27 PROCEDURE — 90686 IIV4 VACC NO PRSV 0.5 ML IM: CPT | Performed by: NURSE PRACTITIONER

## 2022-12-27 PROCEDURE — 90746 HEPB VACCINE 3 DOSE ADULT IM: CPT | Performed by: NURSE PRACTITIONER

## 2022-12-27 PROCEDURE — 0124A COVID-19 VACCINE BIVALENT BOOSTER 12+ (PFIZER): CPT | Performed by: NURSE PRACTITIONER

## 2022-12-27 RX ORDER — NICOTINE 21 MG/24HR
1 PATCH, TRANSDERMAL 24 HOURS TRANSDERMAL EVERY 24 HOURS
Qty: 14 PATCH | Refills: 0 | Status: SHIPPED | OUTPATIENT
Start: 2022-12-27 | End: 2023-09-12

## 2022-12-27 ASSESSMENT — ANXIETY QUESTIONNAIRES
GAD7 TOTAL SCORE: 8
7. FEELING AFRAID AS IF SOMETHING AWFUL MIGHT HAPPEN: SEVERAL DAYS
4. TROUBLE RELAXING: NOT AT ALL
8. IF YOU CHECKED OFF ANY PROBLEMS, HOW DIFFICULT HAVE THESE MADE IT FOR YOU TO DO YOUR WORK, TAKE CARE OF THINGS AT HOME, OR GET ALONG WITH OTHER PEOPLE?: NOT DIFFICULT AT ALL
GAD7 TOTAL SCORE: 8
6. BECOMING EASILY ANNOYED OR IRRITABLE: SEVERAL DAYS
3. WORRYING TOO MUCH ABOUT DIFFERENT THINGS: MORE THAN HALF THE DAYS
2. NOT BEING ABLE TO STOP OR CONTROL WORRYING: SEVERAL DAYS
5. BEING SO RESTLESS THAT IT IS HARD TO SIT STILL: SEVERAL DAYS
7. FEELING AFRAID AS IF SOMETHING AWFUL MIGHT HAPPEN: SEVERAL DAYS
IF YOU CHECKED OFF ANY PROBLEMS ON THIS QUESTIONNAIRE, HOW DIFFICULT HAVE THESE PROBLEMS MADE IT FOR YOU TO DO YOUR WORK, TAKE CARE OF THINGS AT HOME, OR GET ALONG WITH OTHER PEOPLE: NOT DIFFICULT AT ALL
1. FEELING NERVOUS, ANXIOUS, OR ON EDGE: MORE THAN HALF THE DAYS

## 2022-12-27 ASSESSMENT — ENCOUNTER SYMPTOMS
DIARRHEA: 0
NAUSEA: 0
HEMATOCHEZIA: 0
PALPITATIONS: 0
WEAKNESS: 0
COUGH: 0
HEARTBURN: 0
FEVER: 0
BREAST MASS: 1
CHILLS: 0
SHORTNESS OF BREATH: 0
DYSURIA: 0
ABDOMINAL PAIN: 0
CONSTIPATION: 0
EYE PAIN: 0
HEADACHES: 0
MYALGIAS: 0
DIZZINESS: 0
NERVOUS/ANXIOUS: 1
FREQUENCY: 0
JOINT SWELLING: 0
SORE THROAT: 0
HEMATURIA: 0
ARTHRALGIAS: 0
PARESTHESIAS: 0

## 2022-12-27 ASSESSMENT — PAIN SCALES - GENERAL: PAINLEVEL: NO PAIN (0)

## 2022-12-27 NOTE — PATIENT INSTRUCTIONS
Preventive Health Recommendations  Female Ages 26 - 39  Yearly exam:   See your health care provider every year in order to  Review health changes.   Discuss preventive care.    Review your medicines if you your doctor has prescribed any.    Until age 30: Get a Pap test every three years (more often if you have had an abnormal result).    After age 30: Talk to your doctor about whether you should have a Pap test every 3 years or have a Pap test with HPV screening every 5 years.   You do not need a Pap test if your uterus was removed (hysterectomy) and you have not had cancer.  You should be tested each year for STDs (sexually transmitted diseases), if you're at risk.   Talk to your provider about how often to have your cholesterol checked.  If you are at risk for diabetes, you should have a diabetes test (fasting glucose).  Shots: Get a flu shot each year. Get a tetanus shot every 10 years.   Nutrition:   Eat at least 5 servings of fruits and vegetables each day.  Eat whole-grain bread, whole-wheat pasta and brown rice instead of white grains and rice.  Get adequate Calcium and Vitamin D.     Lifestyle  Exercise at least 150 minutes a week (30 minutes a day, 5 days of the week). This will help you control your weight and prevent disease.  Limit alcohol to one drink per day.  No smoking.   Wear sunscreen to prevent skin cancer.  See your dentist every six months for an exam and cleaning.      What is enteritis?  Types  Complications  When to see a doctor  Treatment  Prevention  Takeaway  Enteritis is inflammation of the small intestine. Types include infectious enteritis, caused by bacteria or viruses, and radiation enteritis, which occurs as a result of radiation therapy.    Enteritis may also include gastritis, where inflammation affects the stomach or, in some cases, colitis, which involves inflammation of the large intestine.    Prevention methods for enteritis include proper food preparation, hygiene, and  cleanliness.    Symptoms of enteritis may include:    abdominal pain or cramps  diarrhea  nausea and vomiting  loss of appetite  bleeding or mucus-like discharge from the rectum  fever  Enteritis may not always require treatment. Mild cases and most viral infections go away on their own within a few days.    Types  There are several types of enteritis, and they each have different causes:    Infectious enteritis  Symptoms of enteritis can include nausea, vomiting, and abdominal cramps.  Infectious enteritis is the most common type and includes bacterial and viral enteritis.    Eating or drinking contaminated food or water causes bacterial enteritis.    Food and drink contamination may occur because of:    poor hygiene  improper food handling  poultry and meat processing  improper cooking  According to the U.S. Food & Drug Administration (FDA), bacteria that most commonly cause food poisoning includeTrusted Source:    bacillus cereus  campylobacter jejuni (C. jejuni)  escherichia coli (E. coli)  salmonella  shigelia  staphylococcus aureus (S. aureus)  The FDA also list the food sources that cause food poisoning. Foods include:    raw poultry and meat  raw shellfish  unpasteurized milk  raw fresh produce  undercooked meat and eggs  Viral enteritis also occurs through eating or drinking contaminated food or water, and after contact with someone who has the virus.    Viral enteritis usually goes away within a few days. A bacterial infection may require medical attention if it lasts for an extended period.    Radiation enteritis  Radiation enteritisTrusted Source may occur as a result of radiation therapy. Radiation not only kills cancer cells, but healthy cells too, including the cells in the mouth, stomach, and bowel.    Radiation can lead to inflammation that may last for several weeks after treatment.      What are the complications of enteritis?  Dehydration is a potential complication. It may cause kidney and  urinary problems, as well as heart problems. It is particularly dangerous in infants and young children, older adults, and those with chronic illnesses.    Symptoms of dehydration may include:    excessive thirst  urinating less frequently or in lower amounts  dark urine  fatigue  weakness  lethargy  dizziness  sunken eyes  lack of tears  dry mouth  When someone has enteritis, it is essential to drink plenty of fluids to prevent dehydration.    When to see a doctor  People with enteritis should seek medical attention if:    symptoms last longer than 2 days  they have a fever  there is blood in the stool  People should also see a doctor if they are experiencing any of the symptoms of dehydration listed above.    What are the treatment options?  An electrolyte solution can help treat dehydration.  Proper hydration is essential even in mild cases of enteritis, as well as for general health.    People can take electrolyte solutions to prevent and treat dehydration.    In severe cases, some people may require intravenous fluids.    The most severe cases usually result from a bacterial infection and may require medication or hospitalization.    Anyone who develops radiation enteritis may need to reschedule or even discontinue their radiation treatment. Surgery may be necessary if severe bowel damage occurs.    Prevention  There are several tips to help prevent food poisoning and enteritis, including practicing proper hygiene, food preparation, and cooking.    Improving hygiene  wash hands with soap and water regularly  wash hands after using the bathroom  wash hands before and after preparing meals and drinks  wash hands before eating  use hand wipes and hand  if running water is not available  avoid drinking from outdoor wells and other unsafe water sources  Food preparation tips  clean kitchen surfaces, utensils, and cooking devices thoroughly  keep different types of foods separate, such as chicken and other  raw produce  Cooking tips  cook all foods at a correct and safe temperature according to the recipe  avoid undercooking chicken, fish, and eggs  Food storage tips  be aware of expiration dates and dispose of any  items  dispose of any spoiled or questionable food items  refrigerate leftovers promptly  do not leave food out in the sun  Travel tips  avoid drinking from outdoor wells and other unsafe water sources without first boiling the water  drink properly sealed bottled water  choose restaurants and street food where it is possible to observe people preparing the food  select restaurants and street food vendors that are well attended, especially by local people  Additional tips  avoid smoking and excessive alcohol use  use caution with nonsteroidal anti-inflammatory drugs (NSAIDs), aspirin, and oral steroids that may inflame the digestive system  talk to a doctor about the potential side effects of certain medications and radiation treatment    Takeaway  Symptoms of enteritis clear up within a few days in most cases.    Severe cases, such as those involving dehydration, may require treatment in a hospital.    In many cases, prevention of enteritis is possible by practicing good hygiene and handling food carefully.

## 2022-12-27 NOTE — NURSING NOTE
Prior to immunization administration, verified patients identity using patient s name and date of birth. Please see Immunization Activity for additional information.     Screening Questionnaire for Adult Immunization    Are you sick today?   No   Do you have allergies to medications, food, a vaccine component or latex?   Yes   Have you ever had a serious reaction after receiving a vaccination?   No   Do you have a long-term health problem with heart, lung, kidney, or metabolic disease (e.g., diabetes), asthma, a blood disorder, no spleen, complement component deficiency, a cochlear implant, or a spinal fluid leak?  Are you on long-term aspirin therapy?   No   Do you have cancer, leukemia, HIV/AIDS, or any other immune system problem?   No   Do you have a parent, brother, or sister with an immune system problem?   No   In the past 3 months, have you taken medications that affect  your immune system, such as prednisone, other steroids, or anticancer drugs; drugs for the treatment of rheumatoid arthritis, Crohn s disease, or psoriasis; or have you had radiation treatments?   No   Have you had a seizure, or a brain or other nervous system problem?   No   During the past year, have you received a transfusion of blood or blood    products, or been given immune (gamma) globulin or antiviral drug?   No   For women: Are you pregnant or is there a chance you could become       pregnant during the next month?   No   Have you received any vaccinations in the past 4 weeks?   No     Immunization questionnaire was positive for at least one answer.  Notified SADIA Monson.        Per orders of SADIA Monson, injection of Hep B, Covid Pfizer bivalent vaccine and influenza vaccine given by Miri Aparicio MA. Patient instructed to remain in clinic for 15 minutes afterwards, and to report any adverse reaction to me immediately.       Screening performed by Miri Aparicio MA  on 12/27/2022 at 3:42 PM.

## 2022-12-28 LAB
HCV AB SERPL QL IA: NONREACTIVE
THYROGLOB AB SERPL IA-ACNC: <20 IU/ML
THYROGLOB SERPL-MCNC: 36.6 NG/ML
THYROPEROXIDASE AB SERPL-ACNC: 15 IU/ML

## 2022-12-30 NOTE — RESULT ENCOUNTER NOTE
Justino Reece,    Thank you for your recent office visit.    Here are your recent results.  Normal nonfasting glucose is less than 127 years was 117.  Cholesterol is normal for nonfasting.  Other labs are normal.  Continue to work on regular exercise    Feel free to contact me via [employer] or call the clinic at [phone].    Sincerely,    LESLY Mendes, FNP-BC

## 2023-08-31 NOTE — PROGRESS NOTES
Subjective   Patient ID: Valentín is a 79 year old male.  Referring Physician: Cristy Bell MD    Chief Complaint   Patient presents with   • Office Visit     HPI    79 year old male with PMH significant for ischemic cardiomyopathy, CAD s/p CABG x 2 on aspirin and plavix and also aortic valve replacement (bio), CKD who presents for follow-up.    Appt 9/2021:  Seen for anemia.   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   He has a bowel movement once a day. There is no blood in the stool.   Denies having any abdominal pain.   He has never had a colonoscopy before.   Denies FH of colon cancer.   He is concerned about the risks of having an endoscopic work-up given his cardiac disease.  He says all of his native vessels are completely blocked.  I personally reviewed the most recent labs which are summarized as follows:  8/4/21 - normal CBC except for Hgb of 10 and Plt of 126. Normocytic with normal RDW.   4/22/21 - ferritin 10, Hgb of 8.4  He did have a negative hemoccult test  Certainly iron deficiency anemia can be due to a vegetarian diet but this would not account for the acute drop of hemoglobin to the 8 range in April.  In any case it seems like his hemoglobin has come up to the 10 range again.  He does have thrombocytopenia as well which brings up the possibility of H. pylori being responsible for the iron deficiency anemia (this can also cause ITP).  H. pylori infection itself can cause iron deficiency anemia but also can predispose him to gastric erosions or ulcers especially when taking aspirin.  He is relatively risk averse and does not seem comfortable with the risk profile of endoscopic work-up given his cardiac disease which is reasonable.  As a first step I suggested we get an H. pylori breath test along with celiac serologies.  He also wanted to get a CEA though I discussed with him that this does not have good test characteristics to either rule in or rule out colon cancer.  If the H. pylori  Patient seen for one time evaluation and treatment.  Patient did not return for further treatment and current status is unknown.  Please see initial evaluation for further information.     test is positive, I would plan to treat him with Levaquin based triple therapy and an iron supplement and see if his hemoglobin normalizes over the course of a couple months.  If the H. pylori breath test is negative or his anemia does not improve after treatment of H. pylori if present, I would suggest going forward with an upper endoscopy and colonoscopy.  He would need to hold his Plavix for 5 days prior to an endoscopic work-up if this ultimately is planned.  He did have an H pylori breath test which was positive. Celiac serologies and CEA were negative.   I recommended levaquin based triple therapy. He did have a subsequent H pylori breath test which was negative confirming eradication.     Appt 8/31/23:    I personally reviewed the most recent labs which are summarized as follows:  7/11/23 - normal CMP except for BUN 23, Cr 1.33, normal CBC except for Hgb of 9.7 and Plt of 103.   1/17/23 - ferritin 15, %iron sat of 9    Patient here today for evaluation of anorectal mass. States he was wiping 2 weeks ago when he felt a \"thin mass\" coming out of his anus. 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 Denies any rectal bleeding or blood on tissue when wiping. It is not painful or itchy. No other complaints today. Agreeable to proceed with LISET and visual inspection.      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   • Cardiac arrest (CMD)    • Chronic kidney disease, stage 2 (mild) 05/14/2021   • Chronic stable angina (CMD) 02/14/2019   • Coronary atherosclerosis of native coronary artery 02/14/2019   • Dyslipidemia 02/14/2019   • Essential hypertension, benign 02/14/2019   • Hearing loss    • High cholesterol    • History of bicuspid aortic valve    • History of shortness of breath    •  Hyperlipoproteinemia    • Myocardial infarction (CMD)    • S/P AVR 02/14/2019   • Stable angina (CMD)      Past Surgical History:   Procedure Laterality Date   • Aortic valve replacement     • Cardiac catherization     • Cardiac surgery     • Coronary artery bypass graft     • Extracapsular cataract removal w insert io lens prosth wo ecp Bilateral 2019    Dr Yanes     Current Outpatient Medications   Medication Sig Dispense Refill   • bumetanide (BUMEX) 1 MG tablet Take 0.5 tablets by mouth daily. 45 tablet 3   • nitroGLYcerin (NITROSTAT) 0.3 MG sublingual tablet Place 1 tablet under the tongue every 5 minutes as needed for Chest pain. 90 tablet 3   • rosuvastatin (CRESTOR) 20 MG tablet Take 1 tablet by mouth daily. 90 tablet 0   • clopidogrel (PLAVIX) 75 MG tablet Take 1 tablet by mouth daily. 90 tablet 3   • metoPROLOL succinate (TOPROL-XL) 50 MG 24 hr tablet Take 1 tablet by mouth daily. 90 tablet 3   • isosorbide mononitrate (IMDUR) 60 MG 24 hr tablet Take 1 tablet by mouth daily. 90 tablet 3   • ranolazine (RANEXA) 500 MG 12 hr tablet TAKE 1 TABLET TWICE A  tablet 3   • ASPIRIN PO Take 81 mg by mouth daily.     • vitamin D, Cholecalciferol, 25 mcg (1,000 units) capsule Take 1 capsule by mouth daily. 60 capsule 0   • tamsulosin (FLOMAX) 0.4 MG Cap Take 1 capsule by mouth at bedtime. 90 capsule 3   • B Complex-Biotin-FA (B-COMPLEX PO) Take 1 tablet by mouth daily.      • Coenzyme Q10 10 MG Cap Take 1 capsule by mouth nightly.        No current facility-administered medications for this visit.       ALLERGIES:  No Known Allergies    Family History   Problem Relation Age of Onset   • Heart disease Mother    • Heart disease Father    • Asthma Father    • Heart disease Sister    • Coronary Artery Disease Brother    • Patient is unaware of any medical problems Daughter         grands 7 son 5 dtr 3 son   • Patient is unaware of any medical problems Daughter         boys 5  3  1 dtr - 5 mth   • Cancer, Colon Neg  Hx    • Cancer, Esophageal Neg Hx    • Cancer, Liver Neg Hx    • Cancer, Rectal Neg Hx    • Cancer, Stomach Neg Hx        Social History     Tobacco Use   • Smoking status: Never   • Smokeless tobacco: Never   Vaping Use   • Vaping Use: never used   Substance Use Topics   • Alcohol use: No   • Drug use: Never       ROS  Except as noted elsewhere in the note, the ROS is negative for Constitutional, HEENT, CV, Respiratory, GI, , Musculoskeletal, Neuro, Psychiatric, Heme/Lymph note, Allergy,Endocrine, and Skin.       Objective   /52   Pulse (!) 59   Ht 5' 5\" (1.651 m)   Wt 65.1 kg (143 lb 9.6 oz)   BMI 23.90 kg/m²   BSA 1.72 m²   The patient is alert and oriented, in no acute distress.  HEENT: Sclera are nonicteric. Neck: There is no lymphadenopathy; the thyroid is notpalpable.  CARDIAC: RRR, normal S1, S2. No murmurs, rubs or gallops.  PULMONARY: Clear bilaterally to ascultation  ABDOMEN: Soft, non-tender, non-distended. Bowel sounds arepresent. No obvious hepatomegaly, splenomegaly, or masses. No hernias or ascites.  PERIANAL: External hemorrhoids.  LISET: No suspicious masses appreciated.  ANOSCOPY: internal hemorrhoids.  GENITALS: Examination deferred.  LYMPATICS: No palpable nodes in the neck  MUSCULOSKELETAL: Normalgait and station, digits and nails. Extremities are all normal without edema.  SKIN: Anicteric.  NEUROLOGICAL: No obvious gross neurological deficients. Alert.  PSYCHIATRIC: Appropriate judgement, insight, mood and affect.      Assessment   Problem List Items Addressed This Visit        Gastrointestinal and Abdominal    Internal hemorrhoids       Hematology and Neoplasia    Iron deficiency anemia   Other Visit Diagnoses     External hemorrhoids without complication    -  Primary          Patient here for evaluation of \"mass\" coming out of his anus. No bleeding or pain. On exam, non-bleeding external hemorrhoids were visualized. LISET was unremarkable. Anoscopy revealed small internal  hemorrhoids. Discussion with patient and wife led to shared decision to avoid aggressive measures at this time. Reassurance provided and patient will return if symptoms worsen or bleeding occurs.  PLAN:  - no aggressive measures, conservative management  - return to clinic if symptoms worsen or bleeding occurs      Discussed with attending Dr. Tommie Murphy. Please await final recommendations.     Itz Henderson,   Internal Medicine, PGY-1      I have personally interviewed and examined this patient with the fellow/resident and agree with the history, physical exam, laboratory and radiographic findings as outlined above. I also agree with the impression and recommendations as outlined above.    He is probably noticing a prolapsing internal hemorrhoid. His Hgb is back to the 9 range which suggests this probably was not due to H pylori which has been treated. Patient and wife prefer to only undergo endoscopic work-up if there is an \"emergency\" indication. EGD/colonoscopy can be considered if there is overt GI bleeding or Hgb drops below 8 or so but otherwise will defer per their preference.     Tommie Murphy MD  Gastroenterology  Specialist in Inflammatory Bowel Disease (Crohn's and Colitis)

## 2023-09-12 ENCOUNTER — VIRTUAL VISIT (OUTPATIENT)
Dept: URGENT CARE | Facility: CLINIC | Age: 38
End: 2023-09-12
Payer: COMMERCIAL

## 2023-09-12 DIAGNOSIS — R05.1 ACUTE COUGH: Primary | ICD-10-CM

## 2023-09-12 PROCEDURE — 99213 OFFICE O/P EST LOW 20 MIN: CPT | Mod: VID | Performed by: EMERGENCY MEDICINE

## 2023-09-12 RX ORDER — DOXYCYCLINE 100 MG/1
100 CAPSULE ORAL 2 TIMES DAILY
Qty: 14 CAPSULE | Refills: 0 | Status: SHIPPED | OUTPATIENT
Start: 2023-09-12 | End: 2023-09-19

## 2023-09-12 RX ORDER — BENZONATATE 200 MG/1
200 CAPSULE ORAL 3 TIMES DAILY PRN
Qty: 30 CAPSULE | Refills: 0 | Status: SHIPPED | OUTPATIENT
Start: 2023-09-12 | End: 2024-02-12

## 2023-09-12 NOTE — PROGRESS NOTES
Video visit:   Start time: 9:58 AM   Stop time: 10:05 AM   Duration: 7 minutes   Patient location: Home   Provider location: [facility] [place] virtual provider (remote).    Platform used for video visit: [facility] [place]       CHIEF COMPLAINT: Cough and congestion      HPI: Patient is a 37-year-old female whose been ill for 5 days and now feels as if she is getting worse.  She had a productive cough, sore throat, headache and sinus congestion.  She is actually worse than 2 days ago.  She is tested for COVID negatively x1.  No chronic respiratory disease.  No shortness of breath per se.      ROS: See HPI otherwise normal.    Allergies   Allergen Reactions     Erythromycin Nausea and Vomiting      Current Outpatient Medications   Medication Sig Dispense Refill     benzonatate (TESSALON) 200 MG capsule Take 1 capsule (200 mg) by mouth 3 times daily as needed 30 capsule 0     doxycycline hyclate (VIBRAMYCIN) 100 MG capsule Take 1 capsule (100 mg) by mouth 2 times daily for 7 days 14 capsule 0         PE: No acute distress on video visit.  She is alert and oriented.  She is nondyspneic appearing speaking in full sentences.  She has an occasional congested sounding cough.        TREATMENT: None.      ASSESSMENT: URI symptoms of 5 days duration and clinically escalating per patient's report.  Informed patient this is probably viral but will equip her with antibiotics to start in 2 to 3 days if symptoms persist, sooner if worse.      DIAGNOSIS: Cough.  Upper respiratory infection.      PLAN: Doxycycline, Tessalon as instructed.  Follow-up 5 to 7 days if still ill, sooner if worse

## 2024-02-04 ENCOUNTER — HEALTH MAINTENANCE LETTER (OUTPATIENT)
Age: 39
End: 2024-02-04

## 2024-02-12 ENCOUNTER — OFFICE VISIT (OUTPATIENT)
Dept: FAMILY MEDICINE | Facility: CLINIC | Age: 39
End: 2024-02-12
Payer: COMMERCIAL

## 2024-02-12 VITALS
HEART RATE: 104 BPM | OXYGEN SATURATION: 99 % | HEIGHT: 67 IN | BODY MASS INDEX: 30.07 KG/M2 | SYSTOLIC BLOOD PRESSURE: 130 MMHG | RESPIRATION RATE: 16 BRPM | TEMPERATURE: 97 F | WEIGHT: 191.6 LBS | DIASTOLIC BLOOD PRESSURE: 70 MMHG

## 2024-02-12 DIAGNOSIS — H53.9 VISION CHANGES: ICD-10-CM

## 2024-02-12 DIAGNOSIS — Z00.00 ROUTINE GENERAL MEDICAL EXAMINATION AT A HEALTH CARE FACILITY: Primary | ICD-10-CM

## 2024-02-12 DIAGNOSIS — Z12.31 ENCOUNTER FOR SCREENING MAMMOGRAM FOR BREAST CANCER: ICD-10-CM

## 2024-02-12 DIAGNOSIS — L30.9 DERMATITIS: ICD-10-CM

## 2024-02-12 PROCEDURE — 99213 OFFICE O/P EST LOW 20 MIN: CPT | Mod: 25 | Performed by: NURSE PRACTITIONER

## 2024-02-12 PROCEDURE — 99395 PREV VISIT EST AGE 18-39: CPT | Performed by: NURSE PRACTITIONER

## 2024-02-12 RX ORDER — TRIAMCINOLONE ACETONIDE 1 MG/G
OINTMENT TOPICAL 2 TIMES DAILY
Qty: 30 G | Refills: 1 | Status: SHIPPED | OUTPATIENT
Start: 2024-02-12

## 2024-02-12 SDOH — HEALTH STABILITY: PHYSICAL HEALTH: ON AVERAGE, HOW MANY DAYS PER WEEK DO YOU ENGAGE IN MODERATE TO STRENUOUS EXERCISE (LIKE A BRISK WALK)?: 3 DAYS

## 2024-02-12 SDOH — HEALTH STABILITY: PHYSICAL HEALTH: ON AVERAGE, HOW MANY MINUTES DO YOU ENGAGE IN EXERCISE AT THIS LEVEL?: 30 MIN

## 2024-02-12 ASSESSMENT — ANXIETY QUESTIONNAIRES
IF YOU CHECKED OFF ANY PROBLEMS ON THIS QUESTIONNAIRE, HOW DIFFICULT HAVE THESE PROBLEMS MADE IT FOR YOU TO DO YOUR WORK, TAKE CARE OF THINGS AT HOME, OR GET ALONG WITH OTHER PEOPLE: SOMEWHAT DIFFICULT
7. FEELING AFRAID AS IF SOMETHING AWFUL MIGHT HAPPEN: MORE THAN HALF THE DAYS
2. NOT BEING ABLE TO STOP OR CONTROL WORRYING: MORE THAN HALF THE DAYS
5. BEING SO RESTLESS THAT IT IS HARD TO SIT STILL: MORE THAN HALF THE DAYS
3. WORRYING TOO MUCH ABOUT DIFFERENT THINGS: MORE THAN HALF THE DAYS
GAD7 TOTAL SCORE: 10
6. BECOMING EASILY ANNOYED OR IRRITABLE: SEVERAL DAYS
GAD7 TOTAL SCORE: 10
4. TROUBLE RELAXING: NOT AT ALL
7. FEELING AFRAID AS IF SOMETHING AWFUL MIGHT HAPPEN: MORE THAN HALF THE DAYS
1. FEELING NERVOUS, ANXIOUS, OR ON EDGE: SEVERAL DAYS
8. IF YOU CHECKED OFF ANY PROBLEMS, HOW DIFFICULT HAVE THESE MADE IT FOR YOU TO DO YOUR WORK, TAKE CARE OF THINGS AT HOME, OR GET ALONG WITH OTHER PEOPLE?: SOMEWHAT DIFFICULT

## 2024-02-12 ASSESSMENT — SOCIAL DETERMINANTS OF HEALTH (SDOH): HOW OFTEN DO YOU GET TOGETHER WITH FRIENDS OR RELATIVES?: TWICE A WEEK

## 2024-02-12 NOTE — PATIENT INSTRUCTIONS
Your Health Risk Assessment indicates you feel you are not in good health    A healthy lifestyle helps keep the body fit and the mind alert. It helps protect you from disease, helps you fight disease, and helps prevent chronic disease (disease that doesn't go away) from getting worse. This is important as you get older and begin to notice twinges in muscles and joints and a decline in the strength and stamina you once took for granted. A healthy lifestyle includes good healthcare, good nutrition, weight control, recreation, and regular exercise. Avoid harmful substances and do what you can to keep safe. Another part of a healthy lifestyle is stay mentally active and socially involved.    Good healthcare   Have a wellness visit every year.   If you have new symptoms, let us know right away. Don't wait until the next checkup.   Take medicines exactly as prescribed and keep your medicines in a safe place. Tell us if your medicine causes problems.   Healthy diet and weight control   Eat 3 or 4 small, nutritious, low-fat, high-fiber meals a day. Include a variety of fruits, vegetables, and whole-grain foods.   Make sure you get enough calcium in your diet. Calcium, vitamin D, and exercise help prevent osteoporosis (bone thinning).   If you live alone, try eating with others when you can. That way you get a good meal and have company while you eat it.   Try to keep a healthy weight. If you eat more calories than your body uses for energy, it will be stored as fat and you will gain weight.     Recreation   Recreation is not limited to sports and team events. It includes any activity that provides relaxation, interest, enjoyment, and exercise. Recreation provides an outlet for physical, mental, and social energy. It can give a sense of worth and achievement. It can help you stay healthy.    Mental Exercise and Social Involvement  Mental and emotional health is as important as physical health. Keep in touch with friends and  family. Stay as active as possible. Continue to learn and challenge yourself.   Things you can do to stay mentally active are:  Learn something new, like a foreign language or musical instrument.   Play SCRABBLE or do crossword puzzles. If you cannot find people to play these games with you at home, you can play them with others on your computer through the Internet.   Join a games club--anything from card games to chess or checkers or lawn bowling.   Start a new hobby.   Go back to school.   Volunteer.   Read.   Keep up with world events.  Eating Healthy Foods: Care Instructions  With every meal, you can make healthy food choices. Try to eat a variety of fruits, vegetables, whole grains, lean proteins, and low-fat dairy products. This can help you get the right balance of nutrients, including vitamins and minerals. Small changes add up over time. You can start by adding one healthy food to your meals each day.    Try to make half your plate fruits and vegetables, one-fourth whole grains, and one-fourth lean proteins. Try including dairy with your meals.   Eat more fruits and vegetables. Try to have them with most meals and snacks.   Foods for healthy eating    Fruits    These can be fresh, frozen, canned, or dried.  Try to choose whole fruit rather than fruit juice.  Eat a variety of colors.    Vegetables    These can be fresh, frozen, canned, or dried.  Beans, peas, and lentils count too.    Whole grains    Choose whole-grain breads, cereals, and noodles.  Try brown rice.    Lean proteins    These can include lean meat, poultry, fish, and eggs.  You can also have tofu, beans, peas, lentils, nuts, and seeds.    Dairy    Try milk, yogurt, and cheese.  Choose low-fat or fat-free when you can.  If you need to, use lactose-free milk or fortified plant-based milk products, such as soy milk.    Water    Drink water when you're thirsty.  Limit sugar-sweetened drinks, including soda, fruit drinks, and sports drinks.  Where  "can you learn more?  Go to https://www.[employer].net/patiented  Enter T756 in the search box to learn more about \"Eating Healthy Foods: Care Instructions.\"  Current as of: February 28, 2023               Content Version: 13.8    3539-9711 [employer].   Care instructions adapted under license by your healthcare professional. If you have questions about a medical condition or this instruction, always ask your healthcare professional. [employer] disclaims any warranty or liability for your use of this information.      Learning About Stress  What is stress?     Stress is your body's response to a hard situation. Your body can have a physical, emotional, or mental response. Stress is a fact of life for most people, and it affects everyone differently. What causes stress for you may not be stressful for someone else.  A lot of things can cause stress. You may feel stress when you go on a job interview, take a test, or run a race. This kind of short-term stress is normal and even useful. It can help you if you need to work hard or react quickly. For example, stress can help you finish an important job on time.  Long-term stress is caused by ongoing stressful situations or events. Examples of long-term stress include long-term health problems, ongoing problems at work, or conflicts in your family. Long-term stress can harm your health.  How does stress affect your health?  When you are stressed, your body responds as though you are in danger. It makes hormones that speed up your heart, make you breathe faster, and give you a burst of energy. This is called the fight-or-flight stress response. If the stress is over quickly, your body goes back to normal and no harm is done.  But if stress happens too often or lasts too long, it can have bad effects. Long-term stress can make you more likely to get sick, and it can make symptoms of some diseases worse. If you tense up when you are stressed, you " may develop neck, shoulder, or low back pain. Stress is linked to high blood pressure and heart disease.  Stress also harms your emotional health. It can make you alamo, tense, or depressed. Your relationships may suffer, and you may not do well at work or school.  What can you do to manage stress?  You can try these things to help manage stress:   Do something active. Exercise or activity can help reduce stress. Walking is a great way to get started. Even everyday activities such as housecleaning or yard work can help.  Try yoga or madalyn chi. These techniques combine exercise and meditation. You may need some training at first to learn them.  Do something you enjoy. For example, listen to music or go to a movie. Practice your hobby or do volunteer work.  Meditate. This can help you relax, because you are not worrying about what happened before or what may happen in the future.  Do guided imagery. Imagine yourself in any setting that helps you feel calm. You can use online videos, books, or a teacher to guide you.  Do breathing exercises. For example:  From a standing position, bend forward from the waist with your knees slightly bent. Let your arms dangle close to the floor.  Breathe in slowly and deeply as you return to a standing position. Roll up slowly and lift your head last.  Hold your breath for just a few seconds in the standing position.  Breathe out slowly and bend forward from the waist.  Let your feelings out. Talk, laugh, cry, and express anger when you need to. Talking with supportive friends or family, a counselor, or a tracey leader about your feelings is a healthy way to relieve stress. Avoid discussing your feelings with people who make you feel worse.  Write. It may help to write about things that are bothering you. This helps you find out how much stress you feel and what is causing it. When you know this, you can find better ways to cope.  What can you do to prevent stress?  You might try some of  "these things to help prevent stress:  Manage your time. This helps you find time to do the things you want and need to do.  Get enough sleep. Your body recovers from the stresses of the day while you are sleeping.  Get support. Your family, friends, and community can make a difference in how you experience stress.  Limit your news feed. Avoid or limit time on social media or news that may make you feel stressed.  Do something active. Exercise or activity can help reduce stress. Walking is a great way to get started.  Where can you learn more?  Go to https://www.[employer]BMed[employer].net/patiented  Enter N032 in the search box to learn more about \"Learning About Stress.\"  Current as of: February 26, 2023               Content Version: 13.8    8127-1041 [email or web address].   Care instructions adapted under license by your healthcare professional. If you have questions about a medical condition or this instruction, always ask your healthcare professional. [email or web address] disclaims any warranty or liability for your use of this information.      Substance Use Disorder: Care Instructions  Overview     You can improve your life and health by stopping your use of alcohol or drugs. When you don't drink or use drugs, you may feel and sleep better. You may get along better with your family, friends, and coworkers. There are medicines and programs that can help with substance use disorder.  How can you care for yourself at home?  Here are some ways to help you stay sober and prevent relapse.  If you have been given medicine to help keep you sober or reduce your cravings, be sure to take it exactly as prescribed.  Talk to your doctor about programs that can help you stop using drugs or drinking alcohol.  Do not keep alcohol or drugs in your home.  Plan ahead. Think about what you'll say if other people ask you to drink or use drugs. Try not to spend time with people who drink or use drugs.  Use the time and money spent on " drinking or drugs to do something that's important to you.  Preventing a relapse  Have a plan to deal with relapse. Learn to recognize changes in your thinking that lead you to drink or use drugs. Get help before you start to drink or use drugs again.  Try to stay away from situations, friends, or places that may lead you to drink or use drugs.  If you feel the need to drink alcohol or use drugs again, seek help right away. Call a trusted friend or family member. Some people get support from organizations such as Narcotics Anonymous or [employer] or from treatment facilities.  If you relapse, get help as soon as you can. Some people make a plan with another person that outlines what they want that person to do for them if they relapse. The plan usually includes how to handle the relapse and who to notify in case of relapse.  Don't give up. Remember that a relapse doesn't mean that you have failed. Use the experience to learn the triggers that lead you to drink or use drugs. Then quit again. Recovery is a lifelong process. Many people have several relapses before they are able to quit for good.  Follow-up care is a key part of your treatment and safety. Be sure to make and go to all appointments, and call your doctor if you are having problems. It's also a good idea to know your test results and keep a list of the medicines you take.  When should you call for help?   Call 911  anytime you think you may need emergency care. For example, call if you or someone else:    Has overdosed or has withdrawal signs. Be sure to tell the emergency workers that you are or someone else is using or trying to quit using drugs. Overdose or withdrawal signs may include:  Losing consciousness.  Seizure.  Seeing or hearing things that aren't there (hallucinations).     Is thinking or talking about suicide or harming others.   Where to get help 24 hours a day, 7 days a week   If you or someone you know talks about suicide, self-harm,  "a mental health crisis, a substance use crisis, or any other kind of emotional distress, get help right away. You can:    Call the Suicide and Crisis Lifeline at 988.     Call 8-462-215-TALK (1-524.766.3465).     Text HOME to 351334 to access the Crisis Text Line.   Consider saving these numbers in your phone.  Go to [email or web address] for more information or to chat online.  Call your doctor now or seek immediate medical care if:    You are having withdrawal symptoms. These may include nausea or vomiting, sweating, shakiness, and anxiety.   Watch closely for changes in your health, and be sure to contact your doctor if:    You have a relapse.     You need more help or support to stop.   Where can you learn more?  Go to https://www.[employer].net/patiented  Enter H573 in the search box to learn more about \"Substance Use Disorder: Care Instructions.\"  Current as of: March 21, 2023               Content Version: 13.8    7555-6016 [employer].   Care instructions adapted under license by your healthcare professional. If you have questions about a medical condition or this instruction, always ask your healthcare professional. [employer] disclaims any warranty or liability for your use of this information.      Preventive Care Advice   This is general advice given by our system to help you stay healthy. However, your care team may have specific advice just for you. Please talk to your care team about your preventive care needs.  Nutrition  Eat 5 or more servings of fruits and vegetables each day.  Try wheat bread, brown rice and whole grain pasta (instead of white bread, rice, and pasta).  Get enough calcium and vitamin D. Check the label on foods and aim for 100% of the RDA (recommended daily allowance).  Lifestyle  Exercise at least 150 minutes each week  (30 minutes a day, 5 days a week).  Do muscle strengthening activities 2 days a week. These help control your weight and prevent disease.  No " smoking.  Wear sunscreen to prevent skin cancer.  Have a dental exam and cleaning every 6 months.  Yearly exams  See your health care team every year to talk about:  Any changes in your health.  Any medicines your care team has prescribed.  Preventive care, family planning, and ways to prevent chronic diseases.  Shots (vaccines)   HPV shots (up to age 26), if you've never had them before.  Hepatitis B shots (up to age 59), if you've never had them before.  COVID-19 shot: Get this shot when it's due.  Flu shot: Get a flu shot every year.  Tetanus shot: Get a tetanus shot every 10 years.  Pneumococcal, hepatitis A, and RSV shots: Ask your care team if you need these based on your risk.  Shingles shot (for age 50 and up)  General health tests  Diabetes screening:  Starting at age 35, Get screened for diabetes at least every 3 years.  If you are younger than age 35, ask your care team if you should be screened for diabetes.  Cholesterol test: At age 39, start having a cholesterol test every 5 years, or more often if advised.  Bone density scan (DEXA): At age 50, ask your care team if you should have this scan for osteoporosis (brittle bones).  Hepatitis C: Get tested at least once in your life.  STIs (sexually transmitted infections)  Before age 24: Ask your care team if you should be screened for STIs.  After age 24: Get screened for STIs if you're at risk. You are at risk for STIs (including HIV) if:  You are sexually active with more than one person.  You don't use condoms every time.  You or a partner was diagnosed with a sexually transmitted infection.  If you are at risk for HIV, ask about PrEP medicine to prevent HIV.  Get tested for HIV at least once in your life, whether you are at risk for HIV or not.  Cancer screening tests  Cervical cancer screening: If you have a cervix, begin getting regular cervical cancer screening tests starting at age 21.  Breast cancer scan (mammogram): If you've ever had breasts,  begin having regular mammograms starting at age 40. This is a scan to check for breast cancer.  Colon cancer screening: It is important to start screening for colon cancer at age 45.  Have a colonoscopy test every 10 years (or more often if you're at risk) Or, ask your provider about stool tests like a FIT test every year or Cologuard test every 3 years.  To learn more about your testing options, visit:   https://[email or web address] [email or web address].pdf.  For help making a decision, visit:   https://bit.DiversityDoctor/wr85820.  Prostate cancer screening test: If you have a prostate, ask your care team if a prostate cancer screening test (PSA) at age 55 is right for you.  Lung cancer screening: If you are a current or former smoker ages 50 to 80, ask your care team if ongoing lung cancer screenings are right for you.  For informational purposes only. Not to replace the advice of your health care provider. Copyright   2023 [facility] [employer]. All rights reserved. Clinically reviewed by the [facility] Transitions Program. [employer] [id number] - REV 01/24.

## 2024-02-12 NOTE — PROGRESS NOTES
"Preventive Care Visit  [facility] TIMOTHY ENGLISH NP, Family Medicine  Feb 12, 2024    Assessment & Plan     Routine general medical examination at a health care facility      Encounter for screening mammogram for breast cancer    - *MA Screening Digital Bilateral; Future    Vision changes    - Adult Eye  Referral; Future    Dermatitis    - triamcinolone (KENALOG) 0.1 % external ointment; Apply topically 2 times daily    Review of external notes as documented elsewhere in note  Ordering of each unique test  Prescription drug management  30 minutes spent by me on the date of the encounter doing chart review, history and exam, documentation and further activities per the note      Nicotine/Tobacco Cessation  She reports that she has been smoking cigarettes. She started smoking about 10 years ago. She has a 2.5 pack-year smoking history. She has never used smokeless tobacco.  Nicotine/Tobacco Cessation Plan  Information offered: Patient not interested at this time      BMI  Estimated body mass index is 30.01 kg/m  as calculated from the following:    Height as of this encounter: 1.702 m (5' 7\").    Weight as of this encounter: 86.9 kg (191 lb 9.6 oz).   Weight management plan: Discussed healthy diet and exercise guidelines has joined [facility]    Counseling  Appropriate preventive services were discussed with this patient, including applicable screening as appropriate for fall prevention, nutrition, physical activity, Tobacco-use cessation, weight loss and cognition.  Checklist reviewing preventive services available has been given to the patient.  Reviewed patient's diet, addressing concerns and/or questions.   She is at risk for lack of exercise and has been provided with information to increase physical activity for the benefit of her well-being.     Patient has been advised of split billing requirements and indicates understanding: Yes    CONSULTATION/REFERRAL to optometry, " mammogram 9/24  Work on weight loss  Regular exercise  See Patient Instructions    Subjective   Mary is a 38 year old, presenting for the following:  Physical        2/12/2024     2:04 PM   Additional Questions   Roomed by Femi      Mental health stable, work stress. Wife was pregnant then they lost pregnancy. Starting IVF. Smoking < 1ppd. Discussed smoking cessation.  Has joined [employer]. Declining vaccines, screening labs.  Due for follow up mammogram in September; sooner if symptoms return.  Has noticed general vision changes, discussed eye exam.    HPI          2/12/2024   General Health   How would you rate your overall physical health? (!) FAIR   Feel stress (tense, anxious, or unable to sleep) To some extent   (!) STRESS CONCERN      2/12/2024   Nutrition   Three or more servings of calcium each day? Yes   Diet: Regular (no restrictions)   How many servings of fruit and vegetables per day? (!) 0-1   How many sweetened beverages each day? 0-1         2/12/2024   Exercise   Days per week of moderate/strenous exercise 3 days   Average minutes spent exercising at this level 30 min         2/12/2024   Social Factors   Frequency of gathering with friends or relatives Twice a week   Worry food won't last until get money to buy more No   Food not last or not have enough money for food? No   Do you have housing?  No   Are you worried about losing your housing? No   Lack of transportation? No   Unable to get utilities (heat,electricity)? No   Want help with housing or utility concern? No   (!) HOUSING CONCERN PRESENT      2/12/2024   Dental   Dentist two times every year? Yes         2/12/2024   TB Screening   Were you born outside of [place]?  No         Today's PHQ-2 Score:       2/12/2024     7:11 AM   PHQ-2 ( 1999 [employer])   Q1: Little interest or pleasure in doing things 1   Q2: Feeling down, depressed or hopeless 1   PHQ-2 Score 2   Q1: Little interest or pleasure in doing things Several days   Q2: Feeling down,  depressed or hopeless Several days   PHQ-2 Score 2           2024   Substance Use   Alcohol more than 3/day or more than 7/wk No   Do you use any other substances recreationally? (!) CANNABIS PRODUCTS     Social History     Tobacco Use    Smoking status: Every Day     Packs/day: 0.50     Years: 5.00     Additional pack years: 0.00     Total pack years: 2.50     Types: Cigarettes     Start date: 2013     Last attempt to quit: 2021     Years since quittin.1    Smokeless tobacco: Never   Vaping Use    Vaping Use: Never used   Substance Use Topics    Alcohol use: Yes     Comment: social    Drug use: Never           2022   LAST FHS-7 RESULTS   1st degree relative breast or ovarian cancer No   Any relative bilateral breast cancer Unknown   Any male have breast cancer No   Any woman have breast and ovarian cancer Unknown   Any woman with breast cancer before 50yrs No   2 or more relatives with breast and/or ovarian cancer Unknown   2 or more relatives with breast and/or bowel cancer Unknown        Mammogram Screening - Patient under 40 years of age: Routine Mammogram Screening not recommended.         2024   STI Screening   New sexual partner(s) since last STI/HIV test? No     History of abnormal Pap smear: Status post benign hysterectomy. Health Maintenance and Surgical History updated.        Latest Ref Rng & Units 3/27/2018     2:16 PM 3/27/2018     2:15 PM 2015    10:15 AM   PAP / HPV   PAP (Historical)  NIL      HPV 16 DNA NEG^Negative  Negative  Negative    HPV 18 DNA NEG^Negative  Negative  Negative    Other HR HPV NEG^Negative  Negative  Negative           Reviewed and updated as needed this visit by Provider   Tobacco  Allergies  Meds  Problems  Med Hx  Surg Hx  Fam Hx  Soc   Hx Sexual Activity          Past Medical History:   Diagnosis Date    Anxiety disorder 2015    Depressive disorder     Major depressive disorder, recurrent episode, mild (H24) 2015     Past  Surgical History:   Procedure Laterality Date    BIOPSY      Before hysterectomy and breast lump    ENT SURGERY      LAPAROSCOPIC HYSTERECTOMY TOTAL N/A 4/10/2019    Procedure: TOTAL LAPAROSCOPIC Vaginal HYSTERECTOMY;  Surgeon: Daya Queen MD;  Location: [facility] OR    NOSE SURGERY      deviated septum    WRIST SURGERY      torn ligament     OB History   No obstetric history on file.     Lab work is in process  Labs reviewed in EPIC  BP Readings from Last 3 Encounters:   24 130/70   22 130/70   21 (!) 147/77    Wt Readings from Last 3 Encounters:   24 86.9 kg (191 lb 9.6 oz)   22 86.5 kg (190 lb 9.6 oz)   21 83.6 kg (184 lb 6.4 oz)                  Patient Active Problem List   Diagnosis    Anxiety    CARDIOVASCULAR SCREENING; LDL GOAL LESS THAN 160    Tobacco use disorder    Endometriosis    Uterine leiomyoma, unspecified location    Adjustment disorder with mixed anxiety and depressed mood    Hx of total hysterectomy    Lipoma of skin and subcutaneous tissue    Hx of breast lump    Erythromelalgia (H24)    Partner relationship problem    Weight gain    Hx of bacterial enteritis     Past Surgical History:   Procedure Laterality Date    BIOPSY      Before hysterectomy and breast lump    ENT SURGERY      LAPAROSCOPIC HYSTERECTOMY TOTAL N/A 4/10/2019    Procedure: TOTAL LAPAROSCOPIC Vaginal HYSTERECTOMY;  Surgeon: Daya Queen MD;  Location: [facility] OR    NOSE SURGERY      deviated septum    WRIST SURGERY  2007    torn ligament       Social History     Tobacco Use    Smoking status: Every Day     Packs/day: 0.50     Years: 5.00     Additional pack years: 0.00     Total pack years: 2.50     Types: Cigarettes     Start date: 2013     Last attempt to quit: 2021     Years since quittin.1    Smokeless tobacco: Never   Substance Use Topics    Alcohol use: Yes     Comment: social     Family History   Problem Relation Age of Onset    Depression Mother     Anxiety  Disorder Mother     Heart Failure Father         mi    Diabetes Father     Hypertension Father     Hyperlipidemia Father     Breast Cancer Maternal Grandmother 70    Heart Failure Maternal Grandfather     Other Cancer Paternal Grandmother         lung    Heart Failure Paternal Grandfather     Depression Sister     Anxiety Disorder Sister     Depression Sister     Depression Sister          Current Outpatient Medications   Medication Sig Dispense Refill    triamcinolone (KENALOG) 0.1 % external ointment Apply topically 2 times daily 30 g 1     Allergies   Allergen Reactions    Erythromycin Nausea and Vomiting     Recent Labs   Lab Test 12/27/22  1555 11/08/19  0923 04/17/19  0000 01/22/19  1224 03/27/18  1421   A1C  --   --   --   --  5.0   * 124*  --   --  136*   HDL 51 46*  --   --  57   TRIG 196* 77  --   --  133   ALT  --   --  17  --   --    CR  --   --  0.72  --   --    GFRESTIMATED  --   --  >60  --   --    GFRESTBLACK  --   --  >60  --   --    POTASSIUM  --   --  4.2  --   --    TSH 2.39 1.19  --    < > 1.33    < > = values in this interval not displayed.          Review of Systems  CONSTITUTIONAL: NEGATIVE for fever, chills, change in weight  INTEGUMENTARY/SKIN: NEGATIVE for worrisome moles or lesions POSITIVE intermittent red, raised, dry, itchy rash- discussed dermatitis  EYES: NEGATIVE for vision changes or irritation  ENT/MOUTH: NEGATIVE for ear, mouth and throat problems  RESP: NEGATIVE for significant cough or SOB  BREAST: NEGATIVE for masses, tenderness or discharge  CV: NEGATIVE for chest pain, palpitations or peripheral edema  GI: NEGATIVE for nausea, abdominal pain, heartburn, or change in bowel habits  : NEGATIVE for frequency, dysuria, or hematuria  MUSCULOSKELETAL: NEGATIVE for significant arthralgias or myalgia; cyst to R wrist resolved.   NEURO: NEGATIVE for weakness, dizziness or paresthesias  ENDOCRINE: NEGATIVE for temperature intolerance, skin/hair changes  HEME: NEGATIVE for  "bleeding problems  PSYCHIATRIC: NEGATIVE for changes in mood or affect     Objective    Exam  /70   Pulse 104   Temp 97  F (36.1  C)   Resp 16   Ht 1.702 m (5' 7\")   Wt 86.9 kg (191 lb 9.6 oz)   LMP 03/09/2019   SpO2 99%   Breastfeeding No   BMI 30.01 kg/m     Estimated body mass index is 30.01 kg/m  as calculated from the following:    Height as of this encounter: 1.702 m (5' 7\").    Weight as of this encounter: 86.9 kg (191 lb 9.6 oz).    Physical Exam  GENERAL: alert and no distress  EYES: Eyes grossly normal to inspection, PERRL and conjunctivae and sclerae normal  HENT: ear canals and TM's normal, nose and mouth without ulcers or lesions  NECK: no adenopathy, no asymmetry, masses, or scars  RESP: lungs clear to auscultation - no rales, rhonchi or wheezes  BREAST: Deferred per guidelines-asymptomatic per patient.  Discussed SBE, symptoms to watch out for and when to follow-up.  Due for annual mammogram starting in September.  CV: regular rate and rhythm, normal S1 S2, no S3 or S4, no murmur, click or rub, no peripheral edema  ABDOMEN: soft, nontender, no hepatosplenomegaly, no masses and bowel sounds normal   (female): Deferred per patient no concerns  MS: no gross musculoskeletal defects noted, no edema, no CVA tenderness  SKIN: no suspicious lesions or rashes  NEURO: Normal strength and tone, cranial nerves intact, mentation intact and speech normal  PSYCH: mentation appears normal, affect normal/bright  LYMPH: no cervical, supraclavicular       Signed Electronically by: SE WASSERMAN    Answers submitted by the patient for this visit:  STEPHANIE-7 (Submitted on 2/12/2024)  STEPHANIE 7 TOTAL SCORE: 10    "

## 2024-08-15 DIAGNOSIS — Z12.31 ENCOUNTER FOR SCREENING MAMMOGRAM FOR BREAST CANCER: Primary | ICD-10-CM

## 2024-08-29 ENCOUNTER — ANCILLARY PROCEDURE (OUTPATIENT)
Dept: MAMMOGRAPHY | Facility: CLINIC | Age: 39
End: 2024-08-29
Attending: FAMILY MEDICINE
Payer: COMMERCIAL

## 2024-08-29 DIAGNOSIS — Z12.31 ENCOUNTER FOR SCREENING MAMMOGRAM FOR BREAST CANCER: ICD-10-CM

## 2024-08-29 PROCEDURE — 77067 SCR MAMMO BI INCL CAD: CPT | Mod: GC

## 2024-08-29 PROCEDURE — 77063 BREAST TOMOSYNTHESIS BI: CPT | Mod: GC

## 2024-09-10 ENCOUNTER — OFFICE VISIT (OUTPATIENT)
Dept: OPTOMETRY | Facility: CLINIC | Age: 39
End: 2024-09-10
Payer: COMMERCIAL

## 2024-09-10 DIAGNOSIS — H11.131 MELANOSIS OF CONJUNCTIVA, RIGHT: ICD-10-CM

## 2024-09-10 DIAGNOSIS — Z01.00 EXAMINATION OF EYES AND VISION: Primary | ICD-10-CM

## 2024-09-10 DIAGNOSIS — H52.223 REGULAR ASTIGMATISM OF BOTH EYES: ICD-10-CM

## 2024-09-10 PROCEDURE — 92015 DETERMINE REFRACTIVE STATE: CPT | Performed by: OPTOMETRIST

## 2024-09-10 PROCEDURE — 92004 COMPRE OPH EXAM NEW PT 1/>: CPT | Performed by: OPTOMETRIST

## 2024-09-10 ASSESSMENT — VISUAL ACUITY
METHOD: SNELLEN - LINEAR
OS_SC: 20/25
OD_SC: 20/30
OD_SC: 20/20
OD_SC+: -1
OS_SC: 20/30

## 2024-09-10 ASSESSMENT — SLIT LAMP EXAM - LIDS
COMMENTS: NORMAL
COMMENTS: NORMAL

## 2024-09-10 ASSESSMENT — KERATOMETRY
OS_K2POWER_DIOPTERS: 44.75
OD_AXISANGLE2_DEGREES: 086
OD_K1POWER_DIOPTERS: 44.25
OS_K1POWER_DIOPTERS: 44.25
OS_AXISANGLE_DEGREES: 134
OS_AXISANGLE2_DEGREES: 044
OD_AXISANGLE_DEGREES: 176
OD_K2POWER_DIOPTERS: 44.50

## 2024-09-10 ASSESSMENT — CUP TO DISC RATIO
OD_RATIO: 0.15
OS_RATIO: 0.15

## 2024-09-10 ASSESSMENT — REFRACTION_MANIFEST
OS_SPHERE: -0.75
OD_SPHERE: -0.25
OS_AXIS: 172
OD_AXIS: 176
METHOD_AUTOREFRACTION: 1
OD_CYLINDER: +0.50
OS_CYLINDER: +0.75

## 2024-09-10 ASSESSMENT — TONOMETRY
OD_IOP_MMHG: 21
OS_IOP_MMHG: 21
IOP_METHOD: APPLANATION

## 2024-09-10 ASSESSMENT — CONF VISUAL FIELD
OS_SUPERIOR_TEMPORAL_RESTRICTION: 0
OS_INFERIOR_NASAL_RESTRICTION: 0
OD_SUPERIOR_TEMPORAL_RESTRICTION: 0
OD_SUPERIOR_NASAL_RESTRICTION: 0
OS_INFERIOR_TEMPORAL_RESTRICTION: 0
OD_INFERIOR_TEMPORAL_RESTRICTION: 0
OS_SUPERIOR_NASAL_RESTRICTION: 0
OD_NORMAL: 1
OS_NORMAL: 1
OD_INFERIOR_NASAL_RESTRICTION: 0

## 2024-09-10 ASSESSMENT — EXTERNAL EXAM - RIGHT EYE: OD_EXAM: NORMAL

## 2024-09-10 ASSESSMENT — EXTERNAL EXAM - LEFT EYE: OS_EXAM: NORMAL

## 2024-09-10 NOTE — LETTER
9/10/2024      Shanell Alfaro  [id number] [address] [address]      Dear Colleague,    Thank you for referring your patient, Shanell Alfaro, to the [facility]. Please see a copy of my visit note below.    Chief Complaint   Patient presents with     Annual Eye Exam     Grey spot on white part of od eye x  a couple years. No pain       Last Eye Exam: 30 years ago?  Dilated Previously: Yes    What are you currently using to see?  does not use glasses or contacts    Distance Vision Acuity: Satisfied with vision    Near Vision Acuity: Satisfied with vision while reading  unaided    Eye Comfort: good  Do you use eye drops? : No  Occupation or Hobbies: investigator     Vickie Vences Optometric Assistant, A[employer]      Medical, surgical and family histories reviewed and updated 9/10/2024.       OBJECTIVE: See Ophthalmology exam    ASSESSMENT:    ICD-10-CM    1. Examination of eyes and vision  Z01.00 EYE EXAM (SIMPLE-NONBILLABLE)      2. Regular astigmatism of both eyes  H52.223 REFRACTION      3. Melanosis of conjunctiva, right  H11.131 EYE EXAM (SIMPLE-NONBILLABLE)          PLAN:     Patient Instructions   Eyeglass prescription given.  Optional glasses as needed.    The pigment right eye is nothing to be concerned about unless any new changes.    Return in 1 year for a complete eye exam or sooner if needed.    Homer Madison, SWETA             Again, thank you for allowing me to participate in the care of your patient.        Sincerely,        Homer Madison, OD

## 2024-09-10 NOTE — PROGRESS NOTES
Chief Complaint   Patient presents with    Annual Eye Exam     Grey spot on white part of od eye x  a couple years. No pain       Last Eye Exam: 30 years ago?  Dilated Previously: Yes    What are you currently using to see?  does not use glasses or contacts    Distance Vision Acuity: Satisfied with vision    Near Vision Acuity: Satisfied with vision while reading  unaided    Eye Comfort: good  Do you use eye drops? : No  Occupation or Hobbies: investigator     Vickie Vences Optometric Assistant, A.B.O.C[employer]      Medical, surgical and family histories reviewed and updated 9/10/2024.       OBJECTIVE: See Ophthalmology exam    ASSESSMENT:    ICD-10-CM    1. Examination of eyes and vision  Z01.00 EYE EXAM (SIMPLE-NONBILLABLE)      2. Regular astigmatism of both eyes  H52.223 REFRACTION      3. Melanosis of conjunctiva, right  H11.131 EYE EXAM (SIMPLE-NONBILLABLE)          PLAN:     Patient Instructions   Eyeglass prescription given.  Optional glasses as needed.    The pigment right eye is nothing to be concerned about unless any new changes.    Return in 1 year for a complete eye exam or sooner if needed.    Homer Madison, OD

## 2024-09-10 NOTE — PATIENT INSTRUCTIONS
Eyeglass prescription given.  Optional glasses as needed.    Astigmatism is a common type of refractive error. It is a condition in which the human eye does not focus light evenly onto the retina, the light-sensitive tissue at the back of the eye.  Astigmatism in the eye means that the cornea is oval like a football instead of spherical like a basketball. Most astigmatic corneas have two curves - a steeper curve and a flatter curve. This causes light to focus on more than one point in the eye, resulting in blurred vision at distance or near.     The pigment right eye is nothing to be concerned about unless any new changes.    Return in 1 year for a complete eye exam or sooner if needed.    Homer Madison, OD    The affects of the dilating drops last for 4- 6 hours.  You will be more sensitive to light and vision will be blurry up close.  Do not drive if you do not feel comfortable.  Mydriatic sunglasses were given if needed.      Optometry Providers       Clinic Locations                                 Telephone Number   [facility]   [facility] [phone]     [facility] Hours:                [facility] Hours:       [facility] Hours:   [address]   [address]     [address] [place] [address]   [place] [address]    [place] [address]  Phone: [phone]                    Phone: [phone]     Phone: [phone]                      Monday 8:00-6:00                          Monday 8:00-6:00                          Monday 8:00-6:00              Tuesday 8:00-6:00                          Tuesday 8:00-6:00                          Tuesday 8:00-6:00              Wednesday 8:00-6:00                  Wednesday 8:00-6:00                   Wednesday 8:00-6:00       Thursday 8:00-6:00                        Thursday 8:00-6:00                         Thursday 8:00-6:00            Friday 8:00-5:00                              Friday 8:00-5:00                              Friday 8:00-5:00    [facility] Hours:   [address] [place] [address]  [phone]    Monday 9:00-6:00  Tuesday 9:00-6:00  Wednesday 9:00-6:00  Thursday 9:00-6:00  Friday 9:00-5:00  As always, Thank you for trusting us with your health care needs!

## 2025-01-13 ENCOUNTER — PATIENT OUTREACH (OUTPATIENT)
Dept: CARE COORDINATION | Facility: CLINIC | Age: 40
End: 2025-01-13
Payer: COMMERCIAL

## 2025-01-27 ENCOUNTER — PATIENT OUTREACH (OUTPATIENT)
Dept: CARE COORDINATION | Facility: CLINIC | Age: 40
End: 2025-01-27
Payer: COMMERCIAL

## 2025-03-29 ENCOUNTER — HEALTH MAINTENANCE LETTER (OUTPATIENT)
Age: 40
End: 2025-03-29

## 2025-05-20 ENCOUNTER — RESULTS FOLLOW-UP (OUTPATIENT)
Dept: FAMILY MEDICINE | Facility: CLINIC | Age: 40
End: 2025-05-20

## 2025-05-20 ENCOUNTER — OFFICE VISIT (OUTPATIENT)
Dept: FAMILY MEDICINE | Facility: CLINIC | Age: 40
End: 2025-05-20
Payer: COMMERCIAL

## 2025-05-20 ENCOUNTER — ANCILLARY PROCEDURE (OUTPATIENT)
Dept: GENERAL RADIOLOGY | Facility: CLINIC | Age: 40
End: 2025-05-20
Attending: PHYSICIAN ASSISTANT
Payer: COMMERCIAL

## 2025-05-20 VITALS
WEIGHT: 186.2 LBS | BODY MASS INDEX: 29.22 KG/M2 | TEMPERATURE: 96.9 F | OXYGEN SATURATION: 100 % | SYSTOLIC BLOOD PRESSURE: 122 MMHG | HEART RATE: 86 BPM | DIASTOLIC BLOOD PRESSURE: 68 MMHG | HEIGHT: 67 IN | RESPIRATION RATE: 16 BRPM

## 2025-05-20 DIAGNOSIS — M25.551 HIP PAIN, RIGHT: Primary | ICD-10-CM

## 2025-05-20 DIAGNOSIS — M25.551 HIP PAIN, RIGHT: ICD-10-CM

## 2025-05-20 PROCEDURE — 3074F SYST BP LT 130 MM HG: CPT | Performed by: PHYSICIAN ASSISTANT

## 2025-05-20 PROCEDURE — G2211 COMPLEX E/M VISIT ADD ON: HCPCS | Performed by: PHYSICIAN ASSISTANT

## 2025-05-20 PROCEDURE — 99213 OFFICE O/P EST LOW 20 MIN: CPT | Performed by: PHYSICIAN ASSISTANT

## 2025-05-20 PROCEDURE — 1126F AMNT PAIN NOTED NONE PRSNT: CPT | Performed by: PHYSICIAN ASSISTANT

## 2025-05-20 PROCEDURE — 3078F DIAST BP <80 MM HG: CPT | Performed by: PHYSICIAN ASSISTANT

## 2025-05-20 PROCEDURE — 73502 X-RAY EXAM HIP UNI 2-3 VIEWS: CPT | Mod: TC | Performed by: RADIOLOGY

## 2025-05-20 ASSESSMENT — ANXIETY QUESTIONNAIRES
GAD7 TOTAL SCORE: 3
4. TROUBLE RELAXING: NOT AT ALL
3. WORRYING TOO MUCH ABOUT DIFFERENT THINGS: SEVERAL DAYS
7. FEELING AFRAID AS IF SOMETHING AWFUL MIGHT HAPPEN: SEVERAL DAYS
7. FEELING AFRAID AS IF SOMETHING AWFUL MIGHT HAPPEN: SEVERAL DAYS
5. BEING SO RESTLESS THAT IT IS HARD TO SIT STILL: NOT AT ALL
2. NOT BEING ABLE TO STOP OR CONTROL WORRYING: SEVERAL DAYS
GAD7 TOTAL SCORE: 3
8. IF YOU CHECKED OFF ANY PROBLEMS, HOW DIFFICULT HAVE THESE MADE IT FOR YOU TO DO YOUR WORK, TAKE CARE OF THINGS AT HOME, OR GET ALONG WITH OTHER PEOPLE?: NOT DIFFICULT AT ALL
1. FEELING NERVOUS, ANXIOUS, OR ON EDGE: NOT AT ALL
6. BECOMING EASILY ANNOYED OR IRRITABLE: NOT AT ALL
IF YOU CHECKED OFF ANY PROBLEMS ON THIS QUESTIONNAIRE, HOW DIFFICULT HAVE THESE PROBLEMS MADE IT FOR YOU TO DO YOUR WORK, TAKE CARE OF THINGS AT HOME, OR GET ALONG WITH OTHER PEOPLE: NOT DIFFICULT AT ALL
GAD7 TOTAL SCORE: 3

## 2025-05-20 ASSESSMENT — ENCOUNTER SYMPTOMS: HIP PAIN: 1

## 2025-05-20 ASSESSMENT — PAIN SCALES - GENERAL: PAINLEVEL_OUTOF10: NO PAIN (0)

## 2025-05-20 NOTE — PROGRESS NOTES
"  Assessment & Plan     Hip pain, right  Xray normal, suspect m/s referred  - XR Hip Right 2-3 Views; Future    Be seen sooner if symptoms worsen or change    The longitudinal plan of care for the diagnosis(es)/condition(s) as documented were addressed during this visit. Due to the added complexity in care, I will continue to support Mary in the subsequent management and with ongoing continuity of care.    Subjective   Mary is a 39 year old, presenting for the following health issues:  Hip Pain        5/20/2025     8:05 AM   Additional Questions   Roomed by Yoselyn SILVA CMA   Accompanied by n/a         5/20/2025     8:05 AM   Patient Reported Additional Medications   Patient reports taking the following new medications No Medications Missing     Hip Pain    History of Present Illness       Reason for visit:  Hip pain  Symptom onset:  1-2 weeks ago  Symptoms include:  Shooting pains, random pains  Symptom intensity:  Moderate  Symptom progression:  Worsening  Had these symptoms before:  No   She is taking medications regularly.      More so at night. Started around 3 weeks ago. Seems very positional. Random pains.  No injury. Doesn't hurt if lying on that side.  Doesn't hurt to touch much. Sometimes hurt if sitting a certain way for too long and sits up. Right hip. Front of hip and deep is location of pain. Once it shot down her leg suddenly and she felt she might fall.     Meds tried? Has not tried anything because it is brief episodes of pain    Walks and plays softball. Doesn't bother her when playing softball. No back pain on that side.     Mri of left hip in 2020 showed partial tearing of glutaeus minimus tendon. Mild arthritis. Saw sports med and pt for this. Went away after seeing pt.             Objective    /68   Pulse 86   Temp 96.9  F (36.1  C) (Temporal)   Resp 16   Ht 1.705 m (5' 7.13\")   Wt 84.5 kg (186 lb 3.2 oz)   LMP 03/09/2019   SpO2 100%   BMI 29.05 kg/m    Body mass index is 29.05 " kg/m .  Physical Exam   GENERAL: alert and no distress  RESP: lungs clear to auscultation - no rales, rhonchi or wheezes  CV: regular rate and rhythm, normal S1 S2, no S3 or S4, no murmur, click or rub, no peripheral edema  MS: somewhat tender lateral hip, patient points to anterior hip/groin area for pain. Has pain with external rotation of hip but worse with internal rotation of hip. Lower leg strength testing normal. Sensation grossly intact. SLR negative.  SKIN: no suspicious lesions or rashes  NEURO: Normal strength and tone, mentation intact and speech normal  PSYCH: mentation appears normal, affect normal/bright    Results for orders placed or performed in visit on 05/20/25   XR Hip Right 2-3 Views     Status: None    Narrative    EXAM: XR HIP RIGHT 2-3 VIEWS  LOCATION: [facility]  DATE: 5/20/2025    INDICATION:  Hip pain, right  COMPARISON: None.      Impression    IMPRESSION: Normal joint spaces and alignment. No fracture.             Signed Electronically by: Ailyn Olivia PA-C

## 2025-05-20 NOTE — PATIENT INSTRUCTIONS
Ibuprofen 600 mg with food every 8 hours for 5 days to help with any inflammation.   I will follow up with x-ray and plan from there

## 2025-05-20 NOTE — RESULT ENCOUNTER NOTE
Chuyita Reece,       Your recent test results are attached, if you have any questions or concerns please feel free to contact me via e-mail or call [phone].  Hip xray completely normal. No arthritis. Referral to non-surgical ortho placed they will call you to schedule.      It was a pleasure to see you at your recent office visit.      Sincerely,  Ailyn Olivia PA-C

## 2025-05-21 ENCOUNTER — PATIENT OUTREACH (OUTPATIENT)
Dept: CARE COORDINATION | Facility: CLINIC | Age: 40
End: 2025-05-21
Payer: COMMERCIAL

## 2025-05-29 NOTE — PROGRESS NOTES
ASSESSMENT & PLAN    Mary was seen today for pain.    Diagnoses and all orders for this visit:    Hip pain, right  -     Orthopedic  Referral  -     MR Hip Right w/o Contrast; Future      May be interested in injection.  Discussed MRI first for better understanding of potential joint issues.  See below.  Questions answered. Discussed signs and symptoms that may indicate more serious issues; the patient was instructed to seek appropriate care if noted. Mary indicates understanding of these issues and agrees with the plan.      See Patient Instructions  Patient Instructions   Right hip area pain suspect related to hip joint. Possible to have some early degenerative change present, also possibly labrum cartilage related.  We discussed:  --therapy exercises  --imaging with MRI  --medication for symptoms  --consideration steroid injection (hip joint target)    Following discussion, given duration and nature of symptoms, along with exam findings, plan MRI right hip next.  From there, considerations may include physical therapy, imaging-guided steroid injection to the hip joint.    Advanced imaging is done by appointment.  This procedure may be scheduled directly via [employer] or by calling [phone], [facility].     Imaging procedures often require prior authorization from your insurance company.  It is likely your exam will be scheduled no sooner than 7 days out to allow for authorization to obtained.    If you are active on [employer], you may have access to your test results before your provider is able to review the study and advise on next steps.      The clinic will plan to contact you with results either via phone or rag & bone. If you have not heard from the clinic within 2-3 days following your MRI, please contact us at [phone] or via Instapage.  Alternatively, if interested in further discussion with me about the findings and next steps, feel free to schedule a telephone visit, video visit, or  recheck appointment in clinic.      If you have any further questions for your physician or physician s care team you can contact them thru [employer] or by calling [phone].      Shad Cottrell [facility]      CC: Ailyn Olivia      -----  Chief Complaint   Patient presents with    Right Hip - Pain       SUBJECTIVE  Shanell Alfaro is a/an 39 year old female who is seen in consultation at the request of  iAlyn Olivia PA-C for evaluation of right hip.     The patient is seen by themselves.    Onset: 1 month(s) ago. Reports insidious onset without acute precipitating event.  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  Additional history:  Insidious onset right anterior deep hip pain.  Pain comes and goes. Generally no pain at rest.  No clear right hip joint noise.  Can limp a bit with increased pain.  With radiating pain, is sharp, brief, more medial thigh to proximal lower leg. No N/T.          REVIEW OF SYSTEMS:  Review of Systems    OBJECTIVE:      Right hip exam    ROM:     grossly intact active and passive ROM   Min pain seated hip flexion, no pain supine  No change with ER  Pain end of IR    Strength:      flexion        abduction        adduction   No change in pain    Non Tender:      greater trochanter    Special Tests:      positive (+) FADIR       Log roll pos      RADIOLOGY:  Final results and radiologist's interpretation, available in the [facility] health record.  Images were reviewed with the patient in the office today.  My personal interpretation of  the performed imaging: no acute bony abnormality noted. Joint space preserved. Subtle prominence femoral head-neck junction may represent some early degenerative change.       EXAM: XR HIP RIGHT 2-3 VIEWS  LOCATION: [facility]  DATE: 5/20/2025     INDICATION:  Hip pain, right  COMPARISON: None.                                                                      IMPRESSION: Normal joint spaces and alignment. No fracture.

## 2025-06-01 SDOH — HEALTH STABILITY: PHYSICAL HEALTH: ON AVERAGE, HOW MANY MINUTES DO YOU ENGAGE IN EXERCISE AT THIS LEVEL?: 20 MIN

## 2025-06-01 SDOH — HEALTH STABILITY: PHYSICAL HEALTH: ON AVERAGE, HOW MANY DAYS PER WEEK DO YOU ENGAGE IN MODERATE TO STRENUOUS EXERCISE (LIKE A BRISK WALK)?: 3 DAYS

## 2025-06-02 ENCOUNTER — OFFICE VISIT (OUTPATIENT)
Dept: ORTHOPEDICS | Facility: CLINIC | Age: 40
End: 2025-06-02
Attending: PHYSICIAN ASSISTANT
Payer: COMMERCIAL

## 2025-06-02 DIAGNOSIS — M25.551 HIP PAIN, RIGHT: ICD-10-CM

## 2025-06-02 PROCEDURE — 99203 OFFICE O/P NEW LOW 30 MIN: CPT | Performed by: PEDIATRICS

## 2025-06-02 NOTE — PATIENT INSTRUCTIONS
Right hip area pain suspect related to hip joint. Possible to have some early degenerative change present, also possibly labrum cartilage related.  We discussed:  --therapy exercises  --imaging with MRI  --medication for symptoms  --consideration steroid injection (hip joint target)    Following discussion, given duration and nature of symptoms, along with exam findings, plan MRI right hip next.  From there, considerations may include physical therapy, imaging-guided steroid injection to the hip joint.    Advanced imaging is done by appointment.  This procedure may be scheduled directly via [employer] or by calling [phone], [facility].     Imaging procedures often require prior authorization from your insurance company.  It is likely your exam will be scheduled no sooner than 7 days out to allow for authorization to obtained.    If you are active on [employer], you may have access to your test results before your provider is able to review the study and advise on next steps.      The clinic will plan to contact you with results either via phone or Speedshape. If you have not heard from the clinic within 2-3 days following your MRI, please contact us at [phone] or via [employer].  Alternatively, if interested in further discussion with me about the findings and next steps, feel free to schedule a telephone visit, video visit, or recheck appointment in clinic.      If you have any further questions for your physician or physician s care team you can contact them thru [employer] or by calling [phone].

## 2025-06-02 NOTE — LETTER
6/2/2025      Shanell Alfaro  [id number] [address] [address]      Dear Colleague,    Thank you for referring your patient, Shanell Alfaro, to the [facility]. Please see a copy of my visit note below.    ASSESSMENT & PLAN    Mary was seen today for pain.    Diagnoses and all orders for this visit:    Hip pain, right  -     Orthopedic  Referral  -     MR Hip Right w/o Contrast; Future      May be interested in injection.  Discussed MRI first for better understanding of potential joint issues.  See below.  Questions answered. Discussed signs and symptoms that may indicate more serious issues; the patient was instructed to seek appropriate care if noted. Mary indicates understanding of these issues and agrees with the plan.      See Patient Instructions  Patient Instructions   Right hip area pain suspect related to hip joint. Possible to have some early degenerative change present, also possibly labrum cartilage related.  We discussed:  --therapy exercises  --imaging with MRI  --medication for symptoms  --consideration steroid injection (hip joint target)    Following discussion, given duration and nature of symptoms, along with exam findings, plan MRI right hip next.  From there, considerations may include physical therapy, imaging-guided steroid injection to the hip joint.    Advanced imaging is done by appointment.  This procedure may be scheduled directly via [employer] or by calling [phone], [facility].     Imaging procedures often require prior authorization from your insurance company.  It is likely your exam will be scheduled no sooner than 7 days out to allow for authorization to obtained.    If you are active on [email or web address], you may have access to your test results before your provider is able to review the study and advise on next steps.      The clinic will plan to contact you with results either via phone or [employer]. If you have  not heard from the clinic within 2-3 days following your MRI, please contact us at [phone] or via [employer].  Alternatively, if interested in further discussion with me about the findings and next steps, feel free to schedule a telephone visit, video visit, or recheck appointment in clinic.      If you have any further questions for your physician or physician s care team you can contact them thru [employer] or by calling [phone].      Shad Cottrell [facility]      CC: Ailyn Olivia      -----  Chief Complaint   Patient presents with     Right Hip - Pain       SUBJECTIVE  Shanell Alfaro is a/an 39 year old female who is seen in consultation at the request of  Ailyn Olivia PA-C for evaluation of right hip.     The patient is seen by themselves.    Onset: 1 month(s) ago. Reports insidious onset without acute precipitating event.  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  Additional history:  Insidious onset right anterior deep hip pain.  Pain comes and goes. Generally no pain at rest.  No clear right hip joint noise.  Can limp a bit with increased pain.  With radiating pain, is sharp, brief, more medial thigh to proximal lower leg. No N/T.          REVIEW OF SYSTEMS:  Review of Systems    OBJECTIVE:      Right hip exam    ROM:     grossly intact active and passive ROM   Min pain seated hip flexion, no pain supine  No change with ER  Pain end of IR    Strength:      flexion        abduction        adduction   No change in pain    Non Tender:      greater  trochanter    Special Tests:      positive (+) FADIR       Log roll pos      RADIOLOGY:  Final results and radiologist's interpretation, available in the [facility] health record.  Images were reviewed with the patient in the office today.  My personal interpretation of the performed imaging: no acute bony abnormality noted. Joint space preserved. Subtle prominence femoral head-neck junction may represent some early degenerative change.       EXAM: XR HIP RIGHT 2-3 VIEWS  LOCATION: [facility]  DATE: 5/20/2025     INDICATION:  Hip pain, right  COMPARISON: None.                                                                      IMPRESSION: Normal joint spaces and alignment. No fracture.               Again, thank you for allowing me to participate in the care of your patient.        Sincerely,        Shad Cottrell, DO    Electronically signed

## 2025-06-17 ENCOUNTER — ANCILLARY PROCEDURE (OUTPATIENT)
Dept: MRI IMAGING | Facility: CLINIC | Age: 40
End: 2025-06-17
Attending: PEDIATRICS
Payer: COMMERCIAL

## 2025-06-17 DIAGNOSIS — M25.551 HIP PAIN, RIGHT: ICD-10-CM

## 2025-06-17 PROCEDURE — 73721 MRI JNT OF LWR EXTRE W/O DYE: CPT | Mod: RT | Performed by: RADIOLOGY

## 2025-06-19 ENCOUNTER — TELEPHONE (OUTPATIENT)
Dept: ORTHOPEDICS | Facility: CLINIC | Age: 40
End: 2025-06-19
Payer: COMMERCIAL

## 2025-06-19 NOTE — TELEPHONE ENCOUNTER
MR Right hip without contrast 6/17/2025 4:06 PM     Techniques: Multiplanar multisequence imaging of the Right hip without  contrast.     History: evaluate anterior, deep right hip pain with intermittent  radiation to the thigh; Hip pain, right      Comparison: Radiographs 5/20/2025     Findings:     Osseous structures  Osseous structures: No fracture, stress reaction, avascular necrosis,  or focal osseous lesion is seen. Patchy areas of red marrow  reconversion.     Articular cartilage and labrum  Assessment limited on this non-arthrographic study due to relative  lack of joint distension.     Articular cartilage: Focal subchondral edema in the posterior femoral  head on sagittal image 20.     Labrum: Anterior superior labral tearing.     Ligament teres and transverse ligament of acetabulum: Intact.     Joint or bursal effusion     Joint effusion: A physiologic amount of joint fluid.     Bursal effusion: Minimal nonspecific edema over the greater  trochanter. No substantial iliopsoas or trochanteric bursal effusion.     Muscles and tendons  Muscles and tendons: Proximal hamstrings, rectus femoris, sartorius,  and iliopsoas tendons are intact. The hip abductors are intact. The  visualized adductor muscles are unremarkable.      Nerves:  The visualized course of the sciatic nerve is unremarkable.     Other Findings:  Small amount of free fluid in the pelvis, likely physiologic.                                                                      Impression:     Anterior superior labral tearing. Focal subchondral edema in the  posterior femoral head suspicious for full-thickness overlying  cartilage loss.     YAMILEX HUITRON MD (Joe)

## 2025-06-23 NOTE — TELEPHONE ENCOUNTER
"MRI shows findings consistent with area of labrum cartilage tearing. There is also mild bone marrow edema (bony inflammation/irritation) in the femoral head (the \"ball\" of the \"ball and cup\" joint); this finding likely is a result of overlying cartilage loss in the hip, or some \"arthritis.\"  See report for details.  Some options:  --physical therapy (goal of improving pain, function)  --anti-inflammatory medication (goal of improving pain, symptoms)  --trial of steroid injection (goal of relieving inflammation and pain)    If PT, monitor 4-6 weeks.  If injection, would get set up with one of my colleagues for use of ultrasound to guide injection.  Otherwise, I would be happy to have a visit with the patient (in person, by video, or by phone) to discuss further if that would be helpful.  Thanks.  Shad Cottrell, , CAQ    "

## 2025-08-11 ENCOUNTER — PATIENT OUTREACH (OUTPATIENT)
Dept: CARE COORDINATION | Facility: CLINIC | Age: 40
End: 2025-08-11
Payer: COMMERCIAL

## 2025-09-04 ENCOUNTER — PATIENT OUTREACH (OUTPATIENT)
Dept: CARE COORDINATION | Facility: CLINIC | Age: 40
End: 2025-09-04
Payer: COMMERCIAL

## (undated) DEVICE — SOL NACL 0.9% INJ 1000ML BAG 2B1324X

## (undated) DEVICE — GLOVE PROTEXIS BLUE W/NEU-THERA 6.5  2D73EB65

## (undated) DEVICE — ENDO TROCAR SHIELDED BLADED KII Z-THRD 05X100MM CTB03

## (undated) DEVICE — SU VICRYL 4-0 PS-2 18" UND J496H

## (undated) DEVICE — TUBING C02 INSUFFLATION LAP FILTER HEATER 6198

## (undated) DEVICE — BARRIER INTERCEED 3X4" 4350

## (undated) DEVICE — GLOVE PROTEXIS W/NEU-THERA 6.0  2D73TE60

## (undated) DEVICE — SOL WATER IRRIG 1000ML BOTTLE 2F7114

## (undated) DEVICE — SPONGE RAY-TEC 4X8" 7318

## (undated) DEVICE — LINEN TOWEL PACK X5 5464

## (undated) DEVICE — PACK SET-UP STD 9102

## (undated) DEVICE — TUBING SMOKE EVAC PNEUVIEW 9660-XE

## (undated) DEVICE — SU VICRYL 0 CT-2 27" J334H

## (undated) DEVICE — NDL INSUFFLATION 13GA 120MM C2201

## (undated) DEVICE — PAD PERI INDIV WRAP 11" 2022A

## (undated) DEVICE — STRAP KNEE/BODY 31143004

## (undated) DEVICE — RETR ELEV / UTERINE MANIPULATOR V-CARE MED CUP 60-6085-201A

## (undated) DEVICE — PREP SKIN SCRUB TRAY 4461A

## (undated) DEVICE — SU VICRYL 0 CT-1 36" J346H

## (undated) DEVICE — CATH TRAY FOLEY SURESTEP 16FR WDRAIN BAG STLK LATEX A300316A

## (undated) DEVICE — DECANTER TRANSFER DEVICE 2008S

## (undated) DEVICE — PANTIES MESH LG/XLG 2PK 706M2

## (undated) DEVICE — ENDO TROCAR SLEEVE KII ADV FIXATION 05X100MM CFS02

## (undated) DEVICE — SUCTION TIP YANKAUER W/O VENT K86

## (undated) DEVICE — SUCTION IRR STRYKERFLOW II W/TIP 250-070-520

## (undated) DEVICE — GLOVE SENSICARE 7.5 MSG1075 LATEX FREE

## (undated) DEVICE — ESU HANDPIECE OLYMPUS PK SPATULA PK-SP0533

## (undated) DEVICE — ESU FCP OLYMPUS PK CUTTING 5MMX33CM PK-CF0533

## (undated) DEVICE — SUCTION MANIFOLD DORNOCH ULTRA CART UL-CL500

## (undated) DEVICE — PAD CHUX UNDERPAD 30X36" P3036C

## (undated) DEVICE — LINEN GOWN X4 5410

## (undated) DEVICE — ENDO TROCAR FIRST ENTRY KII FIOS ADV FIX 05X100MM CFF03

## (undated) DEVICE — TUBING SUCTION MEDI-VAC 1/4"X20' N620A

## (undated) DEVICE — ESU HOLDER LAP INST DISP PURPLE LONG 330MM H-PRO-330

## (undated) DEVICE — Device

## (undated) DEVICE — SOL NACL 0.9% IRRIG 1000ML BOTTLE 2F7124

## (undated) DEVICE — GLOVE PROTEXIS MICRO 7.0  2D73PM70

## (undated) DEVICE — NDL COUNTER 20CT 31142493

## (undated) DEVICE — ENDO SCOPE WARMER LF TM500

## (undated) RX ORDER — ACETAMINOPHEN 325 MG/1
TABLET ORAL
Status: DISPENSED
Start: 2019-04-10

## (undated) RX ORDER — CEFAZOLIN SODIUM 1 G/3ML
INJECTION, POWDER, FOR SOLUTION INTRAMUSCULAR; INTRAVENOUS
Status: DISPENSED
Start: 2019-04-10

## (undated) RX ORDER — FENTANYL CITRATE 50 UG/ML
INJECTION, SOLUTION INTRAMUSCULAR; INTRAVENOUS
Status: DISPENSED
Start: 2019-04-10

## (undated) RX ORDER — LIDOCAINE HYDROCHLORIDE 20 MG/ML
INJECTION, SOLUTION EPIDURAL; INFILTRATION; INTRACAUDAL; PERINEURAL
Status: DISPENSED
Start: 2019-04-10

## (undated) RX ORDER — EPHEDRINE SULFATE 50 MG/ML
INJECTION, SOLUTION INTRAMUSCULAR; INTRAVENOUS; SUBCUTANEOUS
Status: DISPENSED
Start: 2019-04-10

## (undated) RX ORDER — GABAPENTIN 300 MG/1
CAPSULE ORAL
Status: DISPENSED
Start: 2019-04-10

## (undated) RX ORDER — CELECOXIB 200 MG/1
CAPSULE ORAL
Status: DISPENSED
Start: 2019-04-10

## (undated) RX ORDER — OXYCODONE HYDROCHLORIDE 5 MG/1
TABLET ORAL
Status: DISPENSED
Start: 2019-04-10

## (undated) RX ORDER — CEFAZOLIN SODIUM 2 G/100ML
INJECTION, SOLUTION INTRAVENOUS
Status: DISPENSED
Start: 2019-04-10

## (undated) RX ORDER — PROPOFOL 10 MG/ML
INJECTION, EMULSION INTRAVENOUS
Status: DISPENSED
Start: 2019-04-10

## (undated) RX ORDER — ONDANSETRON 2 MG/ML
INJECTION INTRAMUSCULAR; INTRAVENOUS
Status: DISPENSED
Start: 2019-04-10

## (undated) RX ORDER — HYDROMORPHONE HYDROCHLORIDE 1 MG/ML
INJECTION, SOLUTION INTRAMUSCULAR; INTRAVENOUS; SUBCUTANEOUS
Status: DISPENSED
Start: 2019-04-10

## (undated) RX ORDER — SCOLOPAMINE TRANSDERMAL SYSTEM 1 MG/1
PATCH, EXTENDED RELEASE TRANSDERMAL
Status: DISPENSED
Start: 2019-04-10

## (undated) RX ORDER — DEXAMETHASONE SODIUM PHOSPHATE 4 MG/ML
INJECTION, SOLUTION INTRA-ARTICULAR; INTRALESIONAL; INTRAMUSCULAR; INTRAVENOUS; SOFT TISSUE
Status: DISPENSED
Start: 2019-04-10

## (undated) RX ORDER — PHENAZOPYRIDINE HYDROCHLORIDE 200 MG/1
TABLET, FILM COATED ORAL
Status: DISPENSED
Start: 2019-04-10